# Patient Record
Sex: FEMALE | Race: WHITE | NOT HISPANIC OR LATINO | Employment: OTHER | URBAN - METROPOLITAN AREA
[De-identification: names, ages, dates, MRNs, and addresses within clinical notes are randomized per-mention and may not be internally consistent; named-entity substitution may affect disease eponyms.]

---

## 2017-04-10 ENCOUNTER — HOSPITAL ENCOUNTER (EMERGENCY)
Facility: HOSPITAL | Age: 67
Discharge: HOME | End: 2017-04-10
Attending: EMERGENCY MEDICINE
Payer: MEDICARE

## 2017-04-10 VITALS
RESPIRATION RATE: 18 BRPM | HEIGHT: 67 IN | SYSTOLIC BLOOD PRESSURE: 145 MMHG | OXYGEN SATURATION: 99 % | BODY MASS INDEX: 43.16 KG/M2 | HEART RATE: 77 BPM | DIASTOLIC BLOOD PRESSURE: 82 MMHG | TEMPERATURE: 100 F | WEIGHT: 275 LBS

## 2017-04-10 DIAGNOSIS — R11.2 NON-INTRACTABLE VOMITING WITH NAUSEA, UNSPECIFIED VOMITING TYPE: Primary | ICD-10-CM

## 2017-04-10 DIAGNOSIS — R19.7 DIARRHEA, UNSPECIFIED TYPE: ICD-10-CM

## 2017-04-10 LAB
ALBUMIN SERPL BCP-MCNC: 3.4 G/DL
ALP SERPL-CCNC: 84 U/L
ALT SERPL W/O P-5'-P-CCNC: 19 U/L
AMYLASE SERPL-CCNC: 33 U/L
ANION GAP SERPL CALC-SCNC: 12 MMOL/L
AST SERPL-CCNC: 15 U/L
BASOPHILS # BLD AUTO: 0.01 K/UL
BASOPHILS NFR BLD: 0.1 %
BILIRUB SERPL-MCNC: 0.4 MG/DL
BUN SERPL-MCNC: 32 MG/DL
CALCIUM SERPL-MCNC: 9.2 MG/DL
CHLORIDE SERPL-SCNC: 106 MMOL/L
CO2 SERPL-SCNC: 21 MMOL/L
CREAT SERPL-MCNC: 2.1 MG/DL
DIFFERENTIAL METHOD: ABNORMAL
EOSINOPHIL # BLD AUTO: 0.1 K/UL
EOSINOPHIL NFR BLD: 1 %
ERYTHROCYTE [DISTWIDTH] IN BLOOD BY AUTOMATED COUNT: 14.4 %
EST. GFR  (AFRICAN AMERICAN): 28 ML/MIN/1.73 M^2
EST. GFR  (NON AFRICAN AMERICAN): 24 ML/MIN/1.73 M^2
GLUCOSE SERPL-MCNC: 150 MG/DL
HCT VFR BLD AUTO: 40.4 %
HGB BLD-MCNC: 13.3 G/DL
LIPASE SERPL-CCNC: 16 U/L
LYMPHOCYTES # BLD AUTO: 0.4 K/UL
LYMPHOCYTES NFR BLD: 3.2 %
MCH RBC QN AUTO: 27.3 PG
MCHC RBC AUTO-ENTMCNC: 32.9 %
MCV RBC AUTO: 83 FL
MONOCYTES # BLD AUTO: 0.6 K/UL
MONOCYTES NFR BLD: 5.3 %
NEUTROPHILS # BLD AUTO: 10.2 K/UL
NEUTROPHILS NFR BLD: 90.4 %
PLATELET # BLD AUTO: 274 K/UL
PMV BLD AUTO: 9.5 FL
POTASSIUM SERPL-SCNC: 4.3 MMOL/L
PROT SERPL-MCNC: 7.7 G/DL
RBC # BLD AUTO: 4.87 M/UL
SODIUM SERPL-SCNC: 139 MMOL/L
WBC # BLD AUTO: 11.22 K/UL

## 2017-04-10 PROCEDURE — 85025 COMPLETE CBC W/AUTO DIFF WBC: CPT

## 2017-04-10 PROCEDURE — 99284 EMERGENCY DEPT VISIT MOD MDM: CPT | Mod: 25

## 2017-04-10 PROCEDURE — 96361 HYDRATE IV INFUSION ADD-ON: CPT

## 2017-04-10 PROCEDURE — 82150 ASSAY OF AMYLASE: CPT

## 2017-04-10 PROCEDURE — 63600175 PHARM REV CODE 636 W HCPCS: Performed by: EMERGENCY MEDICINE

## 2017-04-10 PROCEDURE — 96374 THER/PROPH/DIAG INJ IV PUSH: CPT

## 2017-04-10 PROCEDURE — 83690 ASSAY OF LIPASE: CPT

## 2017-04-10 PROCEDURE — 25000003 PHARM REV CODE 250: Performed by: EMERGENCY MEDICINE

## 2017-04-10 PROCEDURE — 80053 COMPREHEN METABOLIC PANEL: CPT

## 2017-04-10 RX ORDER — DIPHENOXYLATE HYDROCHLORIDE AND ATROPINE SULFATE 2.5; .025 MG/1; MG/1
1 TABLET ORAL 4 TIMES DAILY PRN
Qty: 20 TABLET | Refills: 0 | Status: SHIPPED | OUTPATIENT
Start: 2017-04-10 | End: 2017-04-20

## 2017-04-10 RX ORDER — LOSARTAN POTASSIUM 25 MG/1
25 TABLET ORAL DAILY
Status: ON HOLD | COMMUNITY
End: 2024-01-23

## 2017-04-10 RX ORDER — ONDANSETRON 2 MG/ML
4 INJECTION INTRAMUSCULAR; INTRAVENOUS
Status: COMPLETED | OUTPATIENT
Start: 2017-04-10 | End: 2017-04-10

## 2017-04-10 RX ORDER — PROMETHAZINE HYDROCHLORIDE 25 MG/1
25 TABLET ORAL EVERY 6 HOURS PRN
Qty: 15 TABLET | Refills: 0 | Status: ON HOLD | OUTPATIENT
Start: 2017-04-10 | End: 2024-01-23

## 2017-04-10 RX ADMIN — SODIUM CHLORIDE 1000 ML: 0.9 INJECTION, SOLUTION INTRAVENOUS at 08:04

## 2017-04-10 RX ADMIN — ONDANSETRON 4 MG: 2 INJECTION INTRAMUSCULAR; INTRAVENOUS at 08:04

## 2017-04-10 NOTE — ED PROVIDER NOTES
"SCRIBE #1 NOTE: I, Ld Gamez, am scribing for, and in the presence of, Mark Acevedo MD. I have scribed the entire note.      History      Chief Complaint   Patient presents with    Nausea     pt has had nausea and vomiting all last night       Review of patient's allergies indicates:   Allergen Reactions    Tetanus vaccines and toxoid Nausea And Vomiting    Codeine Other (See Comments)     Upset stomach        HPI   HPI    4/10/2017, 7:48 AM   History obtained from the patient      History of Present Illness: Sydney Lejeune is a 67 y.o. female patient who presents to the Emergency Department for N/V/D  which onset suddenly last PM. Symptoms are episodic and moderate in severity. Sx are exacerbated by nothing and relieved by nothing. Associated sxs include generalized abd pain which was relieved "after receiving a shot en route to ED via EMS." Patient denies any fever, chills, constipation, CP, SOB, dysuria, difficulty urinating, hematuria, urinary frequency/urgency, hematochezia and all other sxs at this time. No further complaints or concerns at this time.     Arrival mode: Personal vehicle     PCP: Primary Doctor No       Past Medical History:  Past Medical History:   Diagnosis Date    Diabetes mellitus     Diabetes mellitus with stage 4 chronic kidney disease     Hypertension     Kidney stones     MRSA (methicillin resistant staph aureus) culture positive     Renal disorder        Past Surgical History:  Past Surgical History:   Procedure Laterality Date    FOOT SURGERY      kidney stent placement           Family History:  Family History   Problem Relation Age of Onset    Hypertension Mother     Hypertension Father        Social History:  Social History     Social History Main Topics    Smoking status: Former Smoker    Smokeless tobacco: Not on file    Alcohol use No    Drug use: No    Sexual activity: Yes       ROS   Review of Systems   Constitutional: Negative for chills and fever. " "  HENT: Negative for congestion and sore throat.    Respiratory: Negative for chest tightness and shortness of breath.    Cardiovascular: Negative for chest pain.   Gastrointestinal: Positive for abdominal pain, diarrhea, nausea and vomiting.   Musculoskeletal: Negative for back pain and neck pain.   Skin: Negative for rash.   Neurological: Negative for dizziness, numbness and headaches.   Psychiatric/Behavioral: Negative for agitation and confusion.   All other systems reviewed and are negative.      Physical Exam    Initial Vitals   BP Pulse Resp Temp SpO2   04/10/17 0746 04/10/17 0746 04/10/17 0746 04/10/17 0746 04/10/17 0746   156/80 76 16 100.8 °F (38.2 °C) 97 %      Physical Exam  Nursing Notes and Vital Signs Reviewed.  Constitutional: Patient is in no acute distress. Awake and alert. Well-developed and well-nourished.   Head: Atraumatic. Normocephalic.  Eyes: PERRL. EOM intact. Conjunctivae are not pale. No scleral icterus.  ENT: Mucous membranes are dry. Oropharynx is clear and symmetric.    Neck: Supple. Full ROM. No lymphadenopathy.  Cardiovascular: Regular rate. Regular rhythm. No murmurs, rubs, or gallops. Distal pulses are 2+ and symmetric.  Pulmonary/Chest: No respiratory distress. Clear to auscultation bilaterally. No wheezing, rales, or rhonchi.  Abdominal: Soft and non-distended.  There is no tenderness.  No rebound, guarding, or rigidity. Good bowel sounds.  Musculoskeletal: Moves all extremities. No obvious deformities. No edema. No calf tenderness.  Skin: Warm and dry.  Neurological:  Alert, awake, and appropriate.  Normal speech.  No acute focal neurological deficits are appreciated.  Psychiatric: Normal affect. Good eye contact. Appropriate in content.    ED Course    Procedures  ED Vital Signs:  Vitals:    04/10/17 0746   BP: (!) 156/80   Pulse: 76   Resp: 16   Temp: (!) 100.8 °F (38.2 °C)   TempSrc: Oral   SpO2: 97%   Weight: 124.7 kg (275 lb)   Height: 5' 7" (1.702 m)       Abnormal Lab " Results:  Labs Reviewed   CBC W/ AUTO DIFFERENTIAL - Abnormal; Notable for the following:        Result Value    Gran # 10.2 (*)     Lymph # 0.4 (*)     Gran% 90.4 (*)     Lymph% 3.2 (*)     All other components within normal limits   COMPREHENSIVE METABOLIC PANEL - Abnormal; Notable for the following:     CO2 21 (*)     Glucose 150 (*)     BUN, Bld 32 (*)     Creatinine 2.1 (*)     Albumin 3.4 (*)     eGFR if  28 (*)     eGFR if non  24 (*)     All other components within normal limits   LIPASE   AMYLASE   URINALYSIS        All Lab Results:  Results for orders placed or performed during the hospital encounter of 04/10/17   CBC auto differential   Result Value Ref Range    WBC 11.22 3.90 - 12.70 K/uL    RBC 4.87 4.00 - 5.40 M/uL    Hemoglobin 13.3 12.0 - 16.0 g/dL    Hematocrit 40.4 37.0 - 48.5 %    MCV 83 82 - 98 fL    MCH 27.3 27.0 - 31.0 pg    MCHC 32.9 32.0 - 36.0 %    RDW 14.4 11.5 - 14.5 %    Platelets 274 150 - 350 K/uL    MPV 9.5 9.2 - 12.9 fL    Gran # 10.2 (H) 1.8 - 7.7 K/uL    Lymph # 0.4 (L) 1.0 - 4.8 K/uL    Mono # 0.6 0.3 - 1.0 K/uL    Eos # 0.1 0.0 - 0.5 K/uL    Baso # 0.01 0.00 - 0.20 K/uL    Gran% 90.4 (H) 38.0 - 73.0 %    Lymph% 3.2 (L) 18.0 - 48.0 %    Mono% 5.3 4.0 - 15.0 %    Eosinophil% 1.0 0.0 - 8.0 %    Basophil% 0.1 0.0 - 1.9 %    Differential Method Automated    Comprehensive metabolic panel   Result Value Ref Range    Sodium 139 136 - 145 mmol/L    Potassium 4.3 3.5 - 5.1 mmol/L    Chloride 106 95 - 110 mmol/L    CO2 21 (L) 23 - 29 mmol/L    Glucose 150 (H) 70 - 110 mg/dL    BUN, Bld 32 (H) 8 - 23 mg/dL    Creatinine 2.1 (H) 0.5 - 1.4 mg/dL    Calcium 9.2 8.7 - 10.5 mg/dL    Total Protein 7.7 6.0 - 8.4 g/dL    Albumin 3.4 (L) 3.5 - 5.2 g/dL    Total Bilirubin 0.4 0.1 - 1.0 mg/dL    Alkaline Phosphatase 84 55 - 135 U/L    AST 15 10 - 40 U/L    ALT 19 10 - 44 U/L    Anion Gap 12 8 - 16 mmol/L    eGFR if African American 28 (A) >60 mL/min/1.73 m^2    eGFR if  non  24 (A) >60 mL/min/1.73 m^2   Lipase   Result Value Ref Range    Lipase 16 4 - 60 U/L   Amylase   Result Value Ref Range    Amylase 33 20 - 110 U/L         Imaging Results:  Imaging Results     None                  The Emergency Provider reviewed the vital signs and test results, which are outlined above.    ED Discussion     8:53 AM: Reassessed pt at this time.  Pt states her condition has improved at this time and she is feeling much better. Pt is laying comfortably in ED bed and in NAD. Pt is awake, alert, and oriented. Discussed with pt all pertinent ED information and results. Discussed pt dx and plan of tx. Gave pt all f/u and return to the ED instructions. All questions and concerns were addressed at this time. Pt expresses understanding of information and instructions, and is comfortable with plan to discharge. Pt is stable for discharge.    I discussed with patient and/or family/caretaker that evaluation in the ED does not suggest any emergent or life threatening medical conditions requiring immediate intervention beyond what was provided in the ED, and I believe patient is safe for discharge.  Regardless, an unremarkable evaluation in the ED does not preclude the development or presence of a serious of life threatening condition. As such, patient was instructed to return immediately for any worsening or change in current symptoms.        ED Medication(s):  Medications   sodium chloride 0.9% bolus 1,000 mL (1,000 mLs Intravenous New Bag 4/10/17 0808)   ondansetron injection 4 mg (4 mg Intravenous Given 4/10/17 0807)       New Prescriptions    DIPHENOXYLATE-ATROPINE 2.5-0.025 MG (LOMOTIL) 2.5-0.025 MG PER TABLET    Take 1 tablet by mouth 4 (four) times daily as needed for Diarrhea.    PROMETHAZINE (PHENERGAN) 25 MG TABLET    Take 1 tablet (25 mg total) by mouth every 6 (six) hours as needed for Nausea.       Follow-up Information     Follow up with PCP In 2 days.    Contact information:     [phone]            Medical Decision Making    Medical Decision Making:   Clinical Tests:   Lab Tests: Reviewed and Ordered           Scribe Attestation:   Scribe #1: I performed the above scribed service and the documentation accurately describes the services I performed. I attest to the accuracy of the note.    Attending:   Physician Attestation Statement for Scribe #1: I, Mark Acevedo MD, personally performed the services described in this documentation, as scribed by Ld Gamez, in my presence, and it is both accurate and complete.          Clinical Impression       ICD-10-CM ICD-9-CM   1. Non-intractable vomiting with nausea, unspecified vomiting type R11.2 787.01   2. Diarrhea, unspecified type R19.7 787.91       Disposition:   Disposition: Discharged  Condition: Stable         Mark Acevedo MD  04/10/17 0902

## 2017-04-10 NOTE — ED AVS SNAPSHOT
[facility]  [address]  [place] [address]               Sydney Lejeune   4/10/2017  7:44 AM   ED    Description:  Female : 1950   Department:  [facility]            Your Care was Coordinated By:     Provider Role From To    Mark Acevedo MD Attending Provider 04/10/17 0747 --      Reason for Visit     Nausea           Diagnoses this Visit        Comments    Non-intractable vomiting with nausea, unspecified vomiting type    -  Primary     Diarrhea, unspecified type           ED Disposition     ED Disposition Condition Comment    Discharge             To Do List           Follow-up Information     Follow up with PCP In 2 days.    Contact information:    [phone]       These Medications        Disp Refills Start End    promethazine (PHENERGAN) 25 MG tablet 15 tablet 0 4/10/2017     Take 1 tablet (25 mg total) by mouth every 6 (six) hours as needed for Nausea. - Oral    diphenoxylate-atropine 2.5-0.025 mg (LOMOTIL) 2.5-0.025 mg per tablet 20 tablet 0 4/10/2017 2017    Take 1 tablet by mouth 4 (four) times daily as needed for Diarrhea. - Oral      [facility]     [facility] On Call Nurse Care Line -  Assistance  Unless otherwise directed by your provider, please contact [facility], our nurse care line that is available for  assistance.     Registered nurses in the [facility] provide: appointment scheduling, clinical advisement, health education, and other advisory services.  Call: [phone] (toll free)               Medications           Message regarding Medications     Verify the changes and/or additions to your medication regime listed below are the same as discussed with your clinician today.  If any of these changes or additions are incorrect, please notify your healthcare provider.        START taking these NEW medications        Refills    promethazine (PHENERGAN) 25 MG tablet 0    Sig: Take 1  tablet (25 mg total) by mouth every 6 (six) hours as needed for Nausea.    Class: Print    Route: Oral    diphenoxylate-atropine 2.5-0.025 mg (LOMOTIL) 2.5-0.025 mg per tablet 0    Sig: Take 1 tablet by mouth 4 (four) times daily as needed for Diarrhea.    Class: Print    Route: Oral      These medications were administered today        Dose Freq    sodium chloride 0.9% bolus 1,000 mL 1,000 mL ED 1 Time    Sig: Inject 1,000 mLs into the vein ED 1 Time.    Class: Normal    Route: Intravenous    ondansetron injection 4 mg 4 mg ED 1 Time    Sig: Inject 4 mg into the vein ED 1 Time.    Class: Normal    Route: Intravenous           Verify that the below list of medications is an accurate representation of the medications you are currently taking.  If none reported, the list may be blank. If incorrect, please contact your healthcare provider. Carry this list with you in case of emergency.           Current Medications     amlodipine (NORVASC) 10 MG tablet Take 10 mg by mouth once daily.    furosemide (LASIX) 40 MG tablet Take 40 mg by mouth 2 (two) times daily.    insulin aspart protamine-insulin aspart (NOVOLOG 70/30) 100 unit/mL (70-30) InPn pen Inject 28 Units into the skin 2 (two) times daily before meals.     losartan (COZAAR) 25 MG tablet Take 25 mg by mouth once daily.    metoprolol tartrate (LOPRESSOR) 100 MG tablet Take 100 mg by mouth 2 (two) times daily.    pravastatin (PRAVACHOL) 40 MG tablet Take 40 mg by mouth once daily.    diphenoxylate-atropine 2.5-0.025 mg (LOMOTIL) 2.5-0.025 mg per tablet Take 1 tablet by mouth 4 (four) times daily as needed for Diarrhea.    ondansetron (ZOFRAN) 4 MG tablet Take 1 tablet (4 mg total) by mouth every 8 (eight) hours as needed.    oxycodone-acetaminophen (PERCOCET)  mg per tablet Take 1 tablet by mouth every 4 (four) hours as needed for Pain.    promethazine (PHENERGAN) 25 MG tablet Take 1 tablet (25 mg total) by mouth every 6 (six) hours as needed for Nausea.     "       Clinical Reference Information           Your Vitals Were     BP Pulse Temp Resp Height Weight    156/80 (BP Location: Right arm, Patient Position: Sitting) 76 100.8 °F (38.2 °C) (Oral) 16 5' 7" (1.702 m) 124.7 kg (275 lb)    SpO2 BMI             97% 43.07 kg/m2         Allergies as of 4/10/2017        Reactions    Tetanus Vaccines And Toxoid Nausea And Vomiting    Codeine Other (See Comments)    Upset stomach      Immunizations Administered on Date of Encounter - 4/10/2017     None      ED Micro, Lab, POCT     Start Ordered       Status Ordering Provider    04/10/17 0757 04/10/17 0756  CBC auto differential  STAT      Final result     04/10/17 0757 04/10/17 0756  Comprehensive metabolic panel  STAT      Final result     04/10/17 0757 04/10/17 0756  Urinalysis  STAT      Acknowledged     04/10/17 0757 04/10/17 0756  Lipase  STAT      Final result     04/10/17 0757 04/10/17 0756  Amylase  STAT      Final result       ED Imaging Orders     None      Discharge References/Attachments     VOMITING AND DIARRHEA, SELF-CARE FOR (ENGLISH)      [facility] Sign-Up     Activating your [facility] account is as easy as 1-2-3!     1) Visit [email or web address], select Sign Up Now, enter this activation code and your date of birth, then select Next.  [email or web address]  Expires: 5/25/2017  8:55 AM      2) Create a username and password to use when you visit [facility] in the future and select a security question in case you lose your password and select Next.    3) Enter your e-mail address and click Sign Up!    Additional Information  If you have questions, please e-mail [email or web address] or call [phone] to talk to our [facility] staff. Remember, [facility] is NOT to be used for urgent needs. For medical emergencies, dial 911.         Smoking Cessation     If you would like to quit smoking:   You may be eligible for free services if you are a [place] resident and started smoking cigarettes before September 1, 1988.  Call " the Smoking Cessation Trust (SCT) toll free at (731) 910-1762 or (442) 000-9322.   Call 1-800-QUIT-NOW if you do not meet the above criteria.   Contact us via email: [email or web address]   View our website for more information: [email or web address]         [facility]  complies with applicable Federal civil rights laws and does not discriminate on the basis of race, color, national origin, age, disability, or sex.        Language Assistance Services     ATTENTION: Language assistance services are available, free of charge. Please call [phone].      ATENCIÓN: Si habla español, tiene a whitlock disposición servicios gratuitos de asistencia lingüística. Llame al [phone].     CHÚ Ý: N?u b?n nói Ti?ng Vi?t, có các d?ch v? h? tr? ngôn ng? mi?n phí dành cho b?n. G?i s? [phone].

## 2024-01-15 ENCOUNTER — HOSPITAL ENCOUNTER (INPATIENT)
Facility: HOSPITAL | Age: 74
LOS: 8 days | Discharge: HOSPICE | DRG: 296 | End: 2024-01-23
Attending: EMERGENCY MEDICINE | Admitting: INTERNAL MEDICINE
Payer: MEDICARE

## 2024-01-15 DIAGNOSIS — I46.9 CARDIAC ARREST: ICD-10-CM

## 2024-01-15 DIAGNOSIS — N39.0 URINARY TRACT INFECTION WITHOUT HEMATURIA, SITE UNSPECIFIED: ICD-10-CM

## 2024-01-15 DIAGNOSIS — B95.62 MRSA BACTEREMIA: ICD-10-CM

## 2024-01-15 DIAGNOSIS — R65.20 SEVERE SEPSIS: ICD-10-CM

## 2024-01-15 DIAGNOSIS — R78.81 MRSA BACTEREMIA: ICD-10-CM

## 2024-01-15 DIAGNOSIS — I47.20 VENTRICULAR TACHYCARDIA: ICD-10-CM

## 2024-01-15 DIAGNOSIS — A41.9 SEVERE SEPSIS: ICD-10-CM

## 2024-01-15 DIAGNOSIS — J11.1 INFLUENZA: ICD-10-CM

## 2024-01-15 DIAGNOSIS — I46.9 CARDIOPULMONARY ARREST: Primary | ICD-10-CM

## 2024-01-15 DIAGNOSIS — I47.29 NSVT (NONSUSTAINED VENTRICULAR TACHYCARDIA): ICD-10-CM

## 2024-01-15 PROBLEM — I10 HTN (HYPERTENSION): Status: ACTIVE | Noted: 2024-01-15

## 2024-01-15 PROBLEM — Z79.4 TYPE 2 DIABETES MELLITUS WITH KIDNEY COMPLICATION, WITH LONG-TERM CURRENT USE OF INSULIN: Status: ACTIVE | Noted: 2024-01-15

## 2024-01-15 PROBLEM — E11.29 TYPE 2 DIABETES MELLITUS WITH KIDNEY COMPLICATION, WITH LONG-TERM CURRENT USE OF INSULIN: Status: ACTIVE | Noted: 2024-01-15

## 2024-01-15 PROBLEM — J10.1 INFLUENZA B: Status: ACTIVE | Noted: 2024-01-15

## 2024-01-15 PROBLEM — N18.6 ESRD (END STAGE RENAL DISEASE): Status: ACTIVE | Noted: 2024-01-15

## 2024-01-15 PROBLEM — M86.60 CHRONIC OSTEOMYELITIS: Status: ACTIVE | Noted: 2024-01-15

## 2024-01-15 LAB
ALBUMIN SERPL BCP-MCNC: 2.6 G/DL (ref 3.5–5.2)
ALLENS TEST: ABNORMAL
ALP SERPL-CCNC: 147 U/L (ref 55–135)
ALT SERPL W/O P-5'-P-CCNC: 62 U/L (ref 10–44)
ANION GAP SERPL CALC-SCNC: 17 MMOL/L (ref 8–16)
ANISOCYTOSIS BLD QL SMEAR: SLIGHT
AORTIC ROOT ANNULUS: 2.91 CM
APTT PPP: 33.9 SEC (ref 21–32)
ASCENDING AORTA: 2.68 CM
AST SERPL-CCNC: 75 U/L (ref 10–40)
AV INDEX (PROSTH): 0.52
AV MEAN GRADIENT: 11 MMHG
AV PEAK GRADIENT: 20 MMHG
AV REGURGITATION PRESSURE HALF TIME: 497.59 MS
AV VALVE AREA BY VELOCITY RATIO: 1.78 CM²
AV VALVE AREA: 1.63 CM²
AV VELOCITY RATIO: 0.57
BACTERIA #/AREA URNS HPF: ABNORMAL /HPF
BASOPHILS # BLD AUTO: ABNORMAL K/UL (ref 0–0.2)
BASOPHILS NFR BLD: 0 % (ref 0–1.9)
BILIRUB SERPL-MCNC: 0.6 MG/DL (ref 0.1–1)
BILIRUB UR QL STRIP: NEGATIVE
BNP SERPL-MCNC: 1466 PG/ML (ref 0–99)
BSA FOR ECHO PROCEDURE: 2.32 M2
BUN SERPL-MCNC: 7 MG/DL (ref 8–23)
CALCIUM SERPL-MCNC: 8.5 MG/DL (ref 8.7–10.5)
CHLORIDE SERPL-SCNC: 105 MMOL/L (ref 95–110)
CLARITY UR: ABNORMAL
CO2 SERPL-SCNC: 14 MMOL/L (ref 23–29)
COLOR UR: YELLOW
CREAT SERPL-MCNC: 2.4 MG/DL (ref 0.5–1.4)
CV ECHO LV RWT: 0.85 CM
DACRYOCYTES BLD QL SMEAR: ABNORMAL
DELSYS: ABNORMAL
DIFFERENTIAL METHOD BLD: ABNORMAL
DOP CALC AO PEAK VEL: 2.22 M/S
DOP CALC AO VTI: 54 CM
DOP CALC LVOT AREA: 3.1 CM2
DOP CALC LVOT DIAMETER: 2 CM
DOP CALC LVOT PEAK VEL: 1.26 M/S
DOP CALC LVOT STROKE VOLUME: 88.23 CM3
DOP CALC RVOT PEAK VEL: 0.82 M/S
DOP CALC RVOT VTI: 16.9 CM
DOP CALCLVOT PEAK VEL VTI: 28.1 CM
E WAVE DECELERATION TIME: 249.44 MSEC
E/A RATIO: 0.78
ECHO LV POSTERIOR WALL: 1.83 CM (ref 0.6–1.1)
EOSINOPHIL # BLD AUTO: ABNORMAL K/UL (ref 0–0.5)
EOSINOPHIL NFR BLD: 2 % (ref 0–8)
ERYTHROCYTE [DISTWIDTH] IN BLOOD BY AUTOMATED COUNT: 14.3 % (ref 11.5–14.5)
ERYTHROCYTE [SEDIMENTATION RATE] IN BLOOD BY WESTERGREN METHOD: 18 MM/H
EST. GFR  (NO RACE VARIABLE): 21 ML/MIN/1.73 M^2
FIO2: 100
FRACTIONAL SHORTENING: 18 % (ref 28–44)
GLUCOSE SERPL-MCNC: 239 MG/DL (ref 70–110)
GLUCOSE UR QL STRIP: NEGATIVE
HCO3 UR-SCNC: 14.4 MMOL/L (ref 24–28)
HCT VFR BLD AUTO: 32 % (ref 37–48.5)
HGB BLD-MCNC: 9.8 G/DL (ref 12–16)
HGB UR QL STRIP: ABNORMAL
HYALINE CASTS #/AREA URNS LPF: 0 /LPF
IMM GRANULOCYTES # BLD AUTO: ABNORMAL K/UL (ref 0–0.04)
IMM GRANULOCYTES NFR BLD AUTO: ABNORMAL % (ref 0–0.5)
INFLUENZA A, MOLECULAR: NEGATIVE
INFLUENZA B, MOLECULAR: POSITIVE
INR PPP: 1.1 (ref 0.8–1.2)
INTERVENTRICULAR SEPTUM: 1.46 CM (ref 0.6–1.1)
IVC DIAMETER: 2.66 CM
IVRT: 102.76 MSEC
KETONES UR QL STRIP: NEGATIVE
LA MAJOR: 6.39 CM
LA MINOR: 6.8 CM
LA WIDTH: 3.9 CM
LACTATE SERPL-SCNC: 3.9 MMOL/L (ref 0.5–2.2)
LACTATE SERPL-SCNC: 8 MMOL/L (ref 0.5–2.2)
LEFT ATRIUM SIZE: 3.69 CM
LEFT ATRIUM VOLUME INDEX: 36.3 ML/M2
LEFT ATRIUM VOLUME: 80.59 CM3
LEFT INTERNAL DIMENSION IN SYSTOLE: 3.55 CM (ref 2.1–4)
LEFT VENTRICLE DIASTOLIC VOLUME INDEX: 38.09 ML/M2
LEFT VENTRICLE DIASTOLIC VOLUME: 84.55 ML
LEFT VENTRICLE MASS INDEX: 136 G/M2
LEFT VENTRICLE SYSTOLIC VOLUME INDEX: 23.8 ML/M2
LEFT VENTRICLE SYSTOLIC VOLUME: 52.79 ML
LEFT VENTRICULAR INTERNAL DIMENSION IN DIASTOLE: 4.33 CM (ref 3.5–6)
LEFT VENTRICULAR MASS: 301.17 G
LEUKOCYTE ESTERASE UR QL STRIP: ABNORMAL
LV LATERAL E/E' RATIO: 23.75 M/S
LVOT MG: 3.42 MMHG
LVOT MV: 0.88 CM/S
LYMPHOCYTES # BLD AUTO: ABNORMAL K/UL (ref 1–4.8)
LYMPHOCYTES NFR BLD: 16 % (ref 18–48)
MAGNESIUM SERPL-MCNC: 2.2 MG/DL (ref 1.6–2.6)
MCH RBC QN AUTO: 30.2 PG (ref 27–31)
MCHC RBC AUTO-ENTMCNC: 30.6 G/DL (ref 32–36)
MCV RBC AUTO: 99 FL (ref 82–98)
MICROSCOPIC COMMENT: ABNORMAL
MODE: ABNORMAL
MONOCYTES # BLD AUTO: ABNORMAL K/UL (ref 0.3–1)
MONOCYTES NFR BLD: 8 % (ref 4–15)
MV PEAK A VEL: 1.22 M/S
MV PEAK E VEL: 0.95 M/S
MV STENOSIS PRESSURE HALF TIME: 72.34 MS
MV VALVE AREA P 1/2 METHOD: 3.04 CM2
NEUTROPHILS NFR BLD: 74 % (ref 38–73)
NITRITE UR QL STRIP: NEGATIVE
NRBC BLD-RTO: 0 /100 WBC
PCO2 BLDA: 27 MMHG (ref 35–45)
PEEP: 5
PH SMN: 7.34 [PH] (ref 7.35–7.45)
PH UR STRIP: 7 [PH] (ref 5–8)
PHOSPHATE SERPL-MCNC: 3.3 MG/DL (ref 2.7–4.5)
PISA AR MAX VEL: 4.43 M/S
PISA TR MAX VEL: 1.29 M/S
PLATELET # BLD AUTO: 412 K/UL (ref 150–450)
PLATELET BLD QL SMEAR: ABNORMAL
PMV BLD AUTO: 8.9 FL (ref 9.2–12.9)
PO2 BLDA: 505 MMHG (ref 80–100)
POC BE: -11 MMOL/L
POC SATURATED O2: 100 % (ref 95–100)
POCT GLUCOSE: 266 MG/DL (ref 70–110)
POTASSIUM SERPL-SCNC: 3.6 MMOL/L (ref 3.5–5.1)
PROCALCITONIN SERPL IA-MCNC: 0.24 NG/ML
PROT SERPL-MCNC: 7.7 G/DL (ref 6–8.4)
PROT UR QL STRIP: ABNORMAL
PROTHROMBIN TIME: 11.4 SEC (ref 9–12.5)
PV MEAN GRADIENT: 1 MMHG
PV MV: 0.67 M/S
PV PEAK GRADIENT: 5 MMHG
PV PEAK VELOCITY: 1.14 M/S
RA MAJOR: 6.3 CM
RA PRESSURE ESTIMATED: 15 MMHG
RA WIDTH: 2.9 CM
RBC # BLD AUTO: 3.25 M/UL (ref 4–5.4)
RBC #/AREA URNS HPF: 44 /HPF (ref 0–4)
RIGHT VENTRICULAR END-DIASTOLIC DIMENSION: 3.54 CM
RV TB RVSP: 16 MMHG
RV TISSUE DOPPLER FREE WALL SYSTOLIC VELOCITY 1 (APICAL 4 CHAMBER VIEW): 12.74 CM/S
SAMPLE: ABNORMAL
SARS-COV-2 RDRP RESP QL NAA+PROBE: NEGATIVE
SITE: ABNORMAL
SODIUM SERPL-SCNC: 136 MMOL/L (ref 136–145)
SP GR UR STRIP: 1.01 (ref 1–1.03)
SPECIMEN SOURCE: ABNORMAL
SQUAMOUS #/AREA URNS HPF: 2 /HPF
STJ: 2.51 CM
TDI LATERAL: 0.04 M/S
TR MAX PG: 7 MMHG
TRICUSPID ANNULAR PLANE SYSTOLIC EXCURSION: 1.92 CM
TROPONIN I SERPL DL<=0.01 NG/ML-MCNC: 0.04 NG/ML (ref 0–0.03)
TROPONIN I SERPL DL<=0.01 NG/ML-MCNC: 3.11 NG/ML (ref 0–0.03)
TV REST PULMONARY ARTERY PRESSURE: 22 MMHG
UNIDENT CRYS URNS QL MICRO: ABNORMAL
URN SPEC COLLECT METH UR: ABNORMAL
UROBILINOGEN UR STRIP-ACNC: NEGATIVE EU/DL
VT: 450
WBC # BLD AUTO: 23.89 K/UL (ref 3.9–12.7)
WBC #/AREA URNS HPF: >100 /HPF (ref 0–5)
WBC CLUMPS URNS QL MICRO: ABNORMAL
Z-SCORE OF LEFT VENTRICULAR DIMENSION IN END DIASTOLE: -5.83
Z-SCORE OF LEFT VENTRICULAR DIMENSION IN END SYSTOLE: -2.23

## 2024-01-15 PROCEDURE — 63600175 PHARM REV CODE 636 W HCPCS: Performed by: INTERNAL MEDICINE

## 2024-01-15 PROCEDURE — 93010 ELECTROCARDIOGRAM REPORT: CPT | Mod: ,,, | Performed by: INTERNAL MEDICINE

## 2024-01-15 PROCEDURE — 87088 URINE BACTERIA CULTURE: CPT | Performed by: EMERGENCY MEDICINE

## 2024-01-15 PROCEDURE — 93005 ELECTROCARDIOGRAM TRACING: CPT

## 2024-01-15 PROCEDURE — 20000000 HC ICU ROOM

## 2024-01-15 PROCEDURE — 85730 THROMBOPLASTIN TIME PARTIAL: CPT | Performed by: EMERGENCY MEDICINE

## 2024-01-15 PROCEDURE — 63600175 PHARM REV CODE 636 W HCPCS

## 2024-01-15 PROCEDURE — 85610 PROTHROMBIN TIME: CPT | Performed by: EMERGENCY MEDICINE

## 2024-01-15 PROCEDURE — 99900035 HC TECH TIME PER 15 MIN (STAT)

## 2024-01-15 PROCEDURE — 84145 PROCALCITONIN (PCT): CPT | Performed by: EMERGENCY MEDICINE

## 2024-01-15 PROCEDURE — 84484 ASSAY OF TROPONIN QUANT: CPT | Mod: 91 | Performed by: INTERNAL MEDICINE

## 2024-01-15 PROCEDURE — 87502 INFLUENZA DNA AMP PROBE: CPT | Performed by: EMERGENCY MEDICINE

## 2024-01-15 PROCEDURE — 27000207 HC ISOLATION

## 2024-01-15 PROCEDURE — 63600175 PHARM REV CODE 636 W HCPCS: Performed by: EMERGENCY MEDICINE

## 2024-01-15 PROCEDURE — 84484 ASSAY OF TROPONIN QUANT: CPT | Performed by: EMERGENCY MEDICINE

## 2024-01-15 PROCEDURE — 84100 ASSAY OF PHOSPHORUS: CPT | Performed by: EMERGENCY MEDICINE

## 2024-01-15 PROCEDURE — U0002 COVID-19 LAB TEST NON-CDC: HCPCS | Performed by: EMERGENCY MEDICINE

## 2024-01-15 PROCEDURE — 83880 ASSAY OF NATRIURETIC PEPTIDE: CPT | Performed by: EMERGENCY MEDICINE

## 2024-01-15 PROCEDURE — 87077 CULTURE AEROBIC IDENTIFY: CPT | Performed by: EMERGENCY MEDICINE

## 2024-01-15 PROCEDURE — 94002 VENT MGMT INPAT INIT DAY: CPT

## 2024-01-15 PROCEDURE — 99291 CRITICAL CARE FIRST HOUR: CPT

## 2024-01-15 PROCEDURE — 36415 COLL VENOUS BLD VENIPUNCTURE: CPT | Performed by: INTERNAL MEDICINE

## 2024-01-15 PROCEDURE — 82803 BLOOD GASES ANY COMBINATION: CPT

## 2024-01-15 PROCEDURE — 25000003 PHARM REV CODE 250: Performed by: EMERGENCY MEDICINE

## 2024-01-15 PROCEDURE — 96365 THER/PROPH/DIAG IV INF INIT: CPT

## 2024-01-15 PROCEDURE — 51702 INSERT TEMP BLADDER CATH: CPT

## 2024-01-15 PROCEDURE — 25000003 PHARM REV CODE 250: Performed by: INTERNAL MEDICINE

## 2024-01-15 PROCEDURE — 87154 CUL TYP ID BLD PTHGN 6+ TRGT: CPT | Performed by: EMERGENCY MEDICINE

## 2024-01-15 PROCEDURE — 87186 SC STD MICRODIL/AGAR DIL: CPT | Mod: 59 | Performed by: EMERGENCY MEDICINE

## 2024-01-15 PROCEDURE — 87040 BLOOD CULTURE FOR BACTERIA: CPT | Mod: 59 | Performed by: EMERGENCY MEDICINE

## 2024-01-15 PROCEDURE — 80053 COMPREHEN METABOLIC PANEL: CPT | Performed by: EMERGENCY MEDICINE

## 2024-01-15 PROCEDURE — 36600 WITHDRAWAL OF ARTERIAL BLOOD: CPT

## 2024-01-15 PROCEDURE — 27100171 HC OXYGEN HIGH FLOW UP TO 24 HOURS

## 2024-01-15 PROCEDURE — 96366 THER/PROPH/DIAG IV INF ADDON: CPT

## 2024-01-15 PROCEDURE — 87086 URINE CULTURE/COLONY COUNT: CPT | Performed by: EMERGENCY MEDICINE

## 2024-01-15 PROCEDURE — 85027 COMPLETE CBC AUTOMATED: CPT | Performed by: EMERGENCY MEDICINE

## 2024-01-15 PROCEDURE — 83735 ASSAY OF MAGNESIUM: CPT | Performed by: EMERGENCY MEDICINE

## 2024-01-15 PROCEDURE — 81000 URINALYSIS NONAUTO W/SCOPE: CPT | Performed by: EMERGENCY MEDICINE

## 2024-01-15 PROCEDURE — 96368 THER/DIAG CONCURRENT INF: CPT

## 2024-01-15 PROCEDURE — 99900026 HC AIRWAY MAINTENANCE (STAT)

## 2024-01-15 PROCEDURE — 93010 ELECTROCARDIOGRAM REPORT: CPT | Mod: 76,,, | Performed by: INTERNAL MEDICINE

## 2024-01-15 PROCEDURE — 94761 N-INVAS EAR/PLS OXIMETRY MLT: CPT | Mod: XB

## 2024-01-15 PROCEDURE — 85007 BL SMEAR W/DIFF WBC COUNT: CPT | Performed by: EMERGENCY MEDICINE

## 2024-01-15 PROCEDURE — 5A1955Z RESPIRATORY VENTILATION, GREATER THAN 96 CONSECUTIVE HOURS: ICD-10-PCS | Performed by: INTERNAL MEDICINE

## 2024-01-15 PROCEDURE — 83605 ASSAY OF LACTIC ACID: CPT | Mod: 91 | Performed by: EMERGENCY MEDICINE

## 2024-01-15 RX ORDER — POTASSIUM CHLORIDE 7.45 MG/ML
10 INJECTION INTRAVENOUS
Status: DISPENSED | OUTPATIENT
Start: 2024-01-15 | End: 2024-01-15

## 2024-01-15 RX ORDER — PROPOFOL 10 MG/ML
0-50 INJECTION, EMULSION INTRAVENOUS CONTINUOUS
Status: DISCONTINUED | OUTPATIENT
Start: 2024-01-15 | End: 2024-01-16

## 2024-01-15 RX ORDER — PRAVASTATIN SODIUM 20 MG/1
40 TABLET ORAL DAILY
Status: DISCONTINUED | OUTPATIENT
Start: 2024-01-16 | End: 2024-01-16

## 2024-01-15 RX ORDER — CHLORHEXIDINE GLUCONATE ORAL RINSE 1.2 MG/ML
15 SOLUTION DENTAL 2 TIMES DAILY
Status: DISCONTINUED | OUTPATIENT
Start: 2024-01-15 | End: 2024-01-23

## 2024-01-15 RX ORDER — HYDRALAZINE HYDROCHLORIDE 20 MG/ML
10 INJECTION INTRAMUSCULAR; INTRAVENOUS EVERY 6 HOURS PRN
Status: DISCONTINUED | OUTPATIENT
Start: 2024-01-15 | End: 2024-01-19

## 2024-01-15 RX ORDER — CALCIUM GLUCONATE 20 MG/ML
1 INJECTION, SOLUTION INTRAVENOUS
Status: DISCONTINUED | OUTPATIENT
Start: 2024-01-15 | End: 2024-01-18

## 2024-01-15 RX ORDER — CALCIUM GLUCONATE 20 MG/ML
3 INJECTION, SOLUTION INTRAVENOUS
Status: DISCONTINUED | OUTPATIENT
Start: 2024-01-15 | End: 2024-01-18

## 2024-01-15 RX ORDER — CALCIUM GLUCONATE 20 MG/ML
2 INJECTION, SOLUTION INTRAVENOUS
Status: DISCONTINUED | OUTPATIENT
Start: 2024-01-15 | End: 2024-01-18

## 2024-01-15 RX ORDER — MAGNESIUM SULFATE HEPTAHYDRATE 40 MG/ML
2 INJECTION, SOLUTION INTRAVENOUS
Status: DISCONTINUED | OUTPATIENT
Start: 2024-01-15 | End: 2024-01-18

## 2024-01-15 RX ORDER — POTASSIUM CHLORIDE 7.45 MG/ML
80 INJECTION INTRAVENOUS
Status: DISCONTINUED | OUTPATIENT
Start: 2024-01-15 | End: 2024-01-18

## 2024-01-15 RX ORDER — SODIUM CHLORIDE 0.9 % (FLUSH) 0.9 %
10 SYRINGE (ML) INJECTION
Status: DISCONTINUED | OUTPATIENT
Start: 2024-01-15 | End: 2024-01-23 | Stop reason: HOSPADM

## 2024-01-15 RX ORDER — FAMOTIDINE 10 MG/ML
20 INJECTION INTRAVENOUS EVERY 12 HOURS
Status: DISCONTINUED | OUTPATIENT
Start: 2024-01-15 | End: 2024-01-15 | Stop reason: DRUGHIGH

## 2024-01-15 RX ORDER — POTASSIUM CHLORIDE 7.45 MG/ML
40 INJECTION INTRAVENOUS
Status: DISCONTINUED | OUTPATIENT
Start: 2024-01-15 | End: 2024-01-18

## 2024-01-15 RX ORDER — FAMOTIDINE 10 MG/ML
20 INJECTION INTRAVENOUS DAILY
Status: DISCONTINUED | OUTPATIENT
Start: 2024-01-16 | End: 2024-01-16

## 2024-01-15 RX ORDER — POTASSIUM CHLORIDE 7.45 MG/ML
60 INJECTION INTRAVENOUS
Status: DISCONTINUED | OUTPATIENT
Start: 2024-01-15 | End: 2024-01-18

## 2024-01-15 RX ORDER — GLUCAGON 1 MG
1 KIT INJECTION
Status: DISCONTINUED | OUTPATIENT
Start: 2024-01-15 | End: 2024-01-19

## 2024-01-15 RX ORDER — INSULIN ASPART 100 [IU]/ML
0-10 INJECTION, SOLUTION INTRAVENOUS; SUBCUTANEOUS EVERY 6 HOURS PRN
Status: DISCONTINUED | OUTPATIENT
Start: 2024-01-15 | End: 2024-01-19

## 2024-01-15 RX ORDER — MAGNESIUM SULFATE HEPTAHYDRATE 40 MG/ML
4 INJECTION, SOLUTION INTRAVENOUS
Status: DISCONTINUED | OUTPATIENT
Start: 2024-01-15 | End: 2024-01-18

## 2024-01-15 RX ADMIN — SODIUM CHLORIDE 1000 ML: 9 INJECTION, SOLUTION INTRAVENOUS at 01:01

## 2024-01-15 RX ADMIN — AMIODARONE HYDROCHLORIDE 0.5 MG/MIN: 1.8 INJECTION, SOLUTION INTRAVENOUS at 07:01

## 2024-01-15 RX ADMIN — PROPOFOL 5 MCG/KG/MIN: 10 INJECTION, EMULSION INTRAVENOUS at 02:01

## 2024-01-15 RX ADMIN — POTASSIUM CHLORIDE 10 MEQ: 7.46 INJECTION, SOLUTION INTRAVENOUS at 06:01

## 2024-01-15 RX ADMIN — AMIODARONE HYDROCHLORIDE 1 MG/MIN: 1.8 INJECTION, SOLUTION INTRAVENOUS at 01:01

## 2024-01-15 RX ADMIN — POTASSIUM CHLORIDE 10 MEQ: 7.46 INJECTION, SOLUTION INTRAVENOUS at 07:01

## 2024-01-15 RX ADMIN — PROPOFOL 25 MCG/KG/MIN: 10 INJECTION, EMULSION INTRAVENOUS at 09:01

## 2024-01-15 RX ADMIN — CHLORHEXIDINE GLUCONATE 0.12% ORAL RINSE 15 ML: 1.2 LIQUID ORAL at 08:01

## 2024-01-15 RX ADMIN — VANCOMYCIN HYDROCHLORIDE 2000 MG: 10 INJECTION, POWDER, LYOPHILIZED, FOR SOLUTION INTRAVENOUS at 06:01

## 2024-01-15 RX ADMIN — SODIUM CHLORIDE 848 ML: 0.9 INJECTION, SOLUTION INTRAVENOUS at 02:01

## 2024-01-15 RX ADMIN — INSULIN ASPART 4 UNITS: 100 INJECTION, SOLUTION INTRAVENOUS; SUBCUTANEOUS at 06:01

## 2024-01-15 RX ADMIN — PIPERACILLIN SODIUM AND TAZOBACTAM SODIUM 4.5 G: 4; .5 INJECTION, POWDER, FOR SOLUTION INTRAVENOUS at 05:01

## 2024-01-15 NOTE — ED PROVIDER NOTES
SCRIBE #1 NOTE: I, Julio Cesar Floyd, am scribing for, and in the presence of, Kiko Mishra MD. I have scribed the entire note.       History     Chief Complaint   Patient presents with    Cardiac Arrest     BIB EMS. Unwitnessed cardiac arrest, found slumped over steering wheel at a gas station, found pulseless and apneic. 6 rounds of CPR given, ROSC obtained, 3 epis, 300mg amiodarone given.      Review of patient's allergies indicates:   Allergen Reactions    Tetanus vaccines and toxoid Nausea And Vomiting    Codeine Other (See Comments)     Upset stomach         History of Present Illness     HPI    1/15/2024, 1:03 PM  History obtained from the EMS  Limited HPI and ROS secondary to acuity of condition      History of Present Illness: Sydney Lejeune is a 73 y.o. female patient with a PMHx of DM, stage 4 CKD, HTN, kidney stones, and MRSA who presents to the Emergency Department for evaluation post-cardiac arrest. Pt was found slumped over the steering wheel at a gas station, pulseless and apneic for unknown time but paramedics report she was still warm to touch at that time.. Enroute, pt achieved ROSC after 6 rounds CPR, 3 Epinephrine, and 300 mg Amiodarone. Her glucose was ~300 mg/dL, and she has a vascath in her R upper chest wall. She was intubated by paramedics in the field.  EMS reports that she was warm to touch on the scene.     Arrival mode: Ambulance Service     PCP: Maritza, Primary Doctor        Past Medical History:  Past Medical History:   Diagnosis Date    Diabetes mellitus     Diabetes mellitus with stage 4 chronic kidney disease     Hypertension     Kidney stones     MRSA (methicillin resistant staph aureus) culture positive     Renal disorder        Past Surgical History:  Past Surgical History:   Procedure Laterality Date    FOOT SURGERY      kidney stent placement           Family History:  Family History   Problem Relation Age of Onset    Hypertension Mother     Hypertension Father         Social History:  Social History     Tobacco Use    Smoking status: Former    Smokeless tobacco: Not on file   Substance and Sexual Activity    Alcohol use: No    Drug use: No    Sexual activity: Yes        Review of Systems     Review of Systems   Unable to perform ROS: Acuity of condition   Respiratory:  Positive for apnea and shortness of breath.    Neurological:  Positive for syncope.      Physical Exam     Initial Vitals   BP Pulse Resp Temp SpO2   01/15/24 1300 01/15/24 1259 01/15/24 1259 01/15/24 1511 01/15/24 1259   (!) 137/104 70 20 98.3 °F (36.8 °C) (!) 85 %      MAP       --                 Physical Exam  Nursing Notes and Vital Signs Reviewed.  Constitutional: Patient is in severe distress.   Head: Atraumatic. Normocephalic.  Eyes: Pupils fixed.  ENT: Intubated.   Neck: + JVD.  Cardiovascular: Regular rate. Regular rhythm. No murmurs, rubs, or gallops. Distal pulses are unable to be sensed.  Pulmonary/Chest: Severe respiratory distress. Vascath in R upper chest wall. Some retractions noted. Clear to auscultation bilaterally. No wheezing or rales.  Abdominal: Distended. Seemingly no tenderness. Decreased bowel sounds.   Genitourinary: Wearing diaper.  Musculoskeletal: Wearing walking boot in RLE. No obvious deformities. No edema.  Skin: BLE cool to touch. No cyanosis.  Neurological:  Unresponsive.   Psychiatric: Unresponsive.     ED Course   Critical Care    Date/Time: 1/15/2024 1:50 PM    Performed by: Kiko Mishra MD  Authorized by: Kiko Mishra MD  Direct patient critical care time: 15 minutes  Additional history critical care time: 10 minutes  Ordering / reviewing critical care time: 5 minutes  Documentation critical care time: 8 minutes  Consulting other physicians critical care time: 5 minutes  Consult with family critical care time: 8 minutes  Total critical care time (exclusive of procedural time) : 51 minutes  Critical care time was exclusive of separately billable procedures  "and treating other patients and teaching time.  Critical care was necessary to treat or prevent imminent or life-threatening deterioration of the following conditions: cardiac failure and respiratory failure.  Critical care was time spent personally by me on the following activities: blood draw for specimens, development of treatment plan with patient or surrogate, discussions with consultants, interpretation of cardiac output measurements, evaluation of patient's response to treatment, obtaining history from patient or surrogate, examination of patient, ordering and performing treatments and interventions, ordering and review of laboratory studies, ordering and review of radiographic studies, pulse oximetry, re-evaluation of patient's condition, review of old charts and ventilator management.        ED Vital Signs:  Vitals:    01/15/24 1315 01/15/24 1415 01/15/24 1420 01/15/24 1430   BP: 131/69 (!) 157/68  (!) 176/77   Pulse: 74 67  67   Resp: (!) 27 (!) 24  (!) 35   Temp:       TempSrc:       SpO2: 100% 100%  100%   Weight:       Height:   5' 7" (1.702 m)     01/15/24 1445 01/15/24 1506 01/15/24 1511 01/15/24 1515   BP: (!) 215/91 (!) 186/78 (!) 186/78 (!) 192/82   Pulse: (!) 57 63  62   Resp: (!) 45 (!) 41  (!) 24   Temp:   98.3 °F (36.8 °C)    TempSrc:       SpO2: (!) 76% 95%  95%   Weight:   113.4 kg (250 lb)    Height:   5' 7" (1.702 m)     01/15/24 1530 01/15/24 1545 01/15/24 1600 01/15/24 1615   BP: (!) 198/84 (!) 194/85 (!) 193/80 (!) 183/80   Pulse: 63 64 65 64   Resp: (!) 24 (!) 24 (!) 24 (!) 24   Temp:       TempSrc:       SpO2: (!) 94% (!) 94% (!) 94% (!) 93%   Weight:       Height:        01/15/24 1628 01/15/24 1730 01/15/24 1734   BP: (!) 184/81     Pulse: 65  72   Resp: (!) 23  (!) 30   Temp:  99.4 °F (37.4 °C)    TempSrc:  Oral    SpO2: 95%  98%   Weight:      Height:          Abnormal Lab Results:  Labs Reviewed   INFLUENZA A & B BY MOLECULAR - Abnormal; Notable for the following components:       " Result Value    Influenza B, Molecular Positive (*)     All other components within normal limits   CBC W/ AUTO DIFFERENTIAL - Abnormal; Notable for the following components:    WBC 23.89 (*)     RBC 3.25 (*)     Hemoglobin 9.8 (*)     Hematocrit 32.0 (*)     MCV 99 (*)     MCHC 30.6 (*)     MPV 8.9 (*)     Gran % 74.0 (*)     Lymph % 16.0 (*)     All other components within normal limits   COMPREHENSIVE METABOLIC PANEL - Abnormal; Notable for the following components:    CO2 14 (*)     Glucose 239 (*)     BUN 7 (*)     Creatinine 2.4 (*)     Calcium 8.5 (*)     Albumin 2.6 (*)     Alkaline Phosphatase 147 (*)     AST 75 (*)     ALT 62 (*)     eGFR 21 (*)     Anion Gap 17 (*)     All other components within normal limits   LACTIC ACID, PLASMA - Abnormal; Notable for the following components:    Lactate (Lactic Acid) 8.0 (*)     All other components within normal limits    Narrative:     LA critical result(s) called and verbal readback obtained from Rosamaria Cano RN  by ARIEL 01/15/2024 13:40   URINALYSIS, REFLEX TO URINE CULTURE - Abnormal; Notable for the following components:    Appearance, UA Cloudy (*)     Protein, UA 2+ (*)     Occult Blood UA 2+ (*)     Leukocytes, UA 3+ (*)     All other components within normal limits    Narrative:     Specimen Source->Urine   B-TYPE NATRIURETIC PEPTIDE - Abnormal; Notable for the following components:    BNP 1,466 (*)     All other components within normal limits   APTT - Abnormal; Notable for the following components:    aPTT 33.9 (*)     All other components within normal limits   TROPONIN I - Abnormal; Notable for the following components:    Troponin I 0.043 (*)     All other components within normal limits   URINALYSIS MICROSCOPIC - Abnormal; Notable for the following components:    RBC, UA 44 (*)     WBC, UA >100 (*)     WBC Clumps, UA Many (*)     All other components within normal limits    Narrative:     Specimen Source->Urine   ISTAT PROCEDURE - Abnormal; Notable  for the following components:    POC PH 7.336 (*)     POC PCO2 27.0 (*)     POC PO2 505 (*)     POC HCO3 14.4 (*)     POC BE -11 (*)     All other components within normal limits   CULTURE, URINE   MAGNESIUM   PHOSPHORUS   PROTIME-INR   PROCALCITONIN   SARS-COV-2 RNA AMPLIFICATION, QUAL   LACTIC ACID, PLASMA   TROPONIN I   POCT GLUCOSE MONITORING CONTINUOUS        All Lab Results:  Results for orders placed or performed during the hospital encounter of 01/15/24   Influenza A & B by Molecular    Specimen: Nasopharyngeal Swab   Result Value Ref Range    Influenza A, Molecular Negative Negative    Influenza B, Molecular Positive (A) Negative    Flu A & B Source Nasal swab    CBC auto differential   Result Value Ref Range    WBC 23.89 (H) 3.90 - 12.70 K/uL    RBC 3.25 (L) 4.00 - 5.40 M/uL    Hemoglobin 9.8 (L) 12.0 - 16.0 g/dL    Hematocrit 32.0 (L) 37.0 - 48.5 %    MCV 99 (H) 82 - 98 fL    MCH 30.2 27.0 - 31.0 pg    MCHC 30.6 (L) 32.0 - 36.0 g/dL    RDW 14.3 11.5 - 14.5 %    Platelets 412 150 - 450 K/uL    MPV 8.9 (L) 9.2 - 12.9 fL    Immature Granulocytes CANCELED 0.0 - 0.5 %    Immature Grans (Abs) CANCELED 0.00 - 0.04 K/uL    Lymph # CANCELED 1.0 - 4.8 K/uL    Mono # CANCELED 0.3 - 1.0 K/uL    Eos # CANCELED 0.0 - 0.5 K/uL    Baso # CANCELED 0.00 - 0.20 K/uL    nRBC 0 0 /100 WBC    Gran % 74.0 (H) 38.0 - 73.0 %    Lymph % 16.0 (L) 18.0 - 48.0 %    Mono % 8.0 4.0 - 15.0 %    Eosinophil % 2.0 0.0 - 8.0 %    Basophil % 0.0 0.0 - 1.9 %    Platelet Estimate Appears normal     Aniso Slight     Tear Drop Cells Occasional     Differential Method Manual    Comprehensive metabolic panel   Result Value Ref Range    Sodium 136 136 - 145 mmol/L    Potassium 3.6 3.5 - 5.1 mmol/L    Chloride 105 95 - 110 mmol/L    CO2 14 (L) 23 - 29 mmol/L    Glucose 239 (H) 70 - 110 mg/dL    BUN 7 (L) 8 - 23 mg/dL    Creatinine 2.4 (H) 0.5 - 1.4 mg/dL    Calcium 8.5 (L) 8.7 - 10.5 mg/dL    Total Protein 7.7 6.0 - 8.4 g/dL    Albumin 2.6 (L) 3.5 -  5.2 g/dL    Total Bilirubin 0.6 0.1 - 1.0 mg/dL    Alkaline Phosphatase 147 (H) 55 - 135 U/L    AST 75 (H) 10 - 40 U/L    ALT 62 (H) 10 - 44 U/L    eGFR 21 (A) >60 mL/min/1.73 m^2    Anion Gap 17 (H) 8 - 16 mmol/L   Lactic acid, plasma #1   Result Value Ref Range    Lactate (Lactic Acid) 8.0 (HH) 0.5 - 2.2 mmol/L   Urinalysis, Reflex to Urine Culture Urine, Clean Catch    Specimen: Urine   Result Value Ref Range    Specimen UA Urine, Clean Catch     Color, UA Yellow Yellow, Straw, Khalida    Appearance, UA Cloudy (A) Clear    pH, UA 7.0 5.0 - 8.0    Specific Gravity, UA 1.010 1.005 - 1.030    Protein, UA 2+ (A) Negative    Glucose, UA Negative Negative    Ketones, UA Negative Negative    Bilirubin (UA) Negative Negative    Occult Blood UA 2+ (A) Negative    Nitrite, UA Negative Negative    Urobilinogen, UA Negative <2.0 EU/dL    Leukocytes, UA 3+ (A) Negative   Magnesium   Result Value Ref Range    Magnesium 2.2 1.6 - 2.6 mg/dL   Phosphorus   Result Value Ref Range    Phosphorus 3.3 2.7 - 4.5 mg/dL   Brain natriuretic peptide   Result Value Ref Range    BNP 1,466 (H) 0 - 99 pg/mL   APTT   Result Value Ref Range    aPTT 33.9 (H) 21.0 - 32.0 sec   Protime-INR   Result Value Ref Range    Prothrombin Time 11.4 9.0 - 12.5 sec    INR 1.1 0.8 - 1.2   Troponin I   Result Value Ref Range    Troponin I 0.043 (H) 0.000 - 0.026 ng/mL   Procalcitonin   Result Value Ref Range    Procalcitonin 0.24 <0.25 ng/mL   COVID-19 Rapid Screening   Result Value Ref Range    SARS-CoV-2 RNA, Amplification, Qual Negative Negative   Urinalysis Microscopic   Result Value Ref Range    RBC, UA 44 (H) 0 - 4 /hpf    WBC, UA >100 (H) 0 - 5 /hpf    WBC Clumps, UA Many (A) None-Rare    Bacteria None None-Occ /hpf    Squam Epithel, UA 2 /hpf    Hyaline Casts, UA 0 0-1/lpf /lpf    Unclass Jeannette UA Occasional None-Moderate    Microscopic Comment SEE COMMENT    Echo   Result Value Ref Range    BSA 2.32 m2    LVOT stroke volume 88.23 cm3    LVIDd 4.33 3.5 -  6.0 cm    LV Systolic Volume 52.79 mL    LV Systolic Volume Index 23.8 mL/m2    LVIDs 3.55 2.1 - 4.0 cm    LV Diastolic Volume 84.55 mL    LV Diastolic Volume Index 38.09 mL/m2    IVS 1.46 (A) 0.6 - 1.1 cm    LVOT diameter 2.00 cm    LVOT area 3.1 cm2    FS 18 (A) 28 - 44 %    Left Ventricle Relative Wall Thickness 0.85 cm    Posterior Wall 1.83 (A) 0.6 - 1.1 cm    LV mass 301.17 g    LV Mass Index 136 g/m2    MV Peak E Cody 0.95 m/s    TDI LATERAL 0.04 m/s    MV Peak A Cody 1.22 m/s    TR Max Cody 1.29 m/s    E/A ratio 0.78     IVRT 102.76 msec    E wave deceleration time 249.44 msec    LV LATERAL E/E' RATIO 23.75 m/s    LVOT peak cody 1.26 m/s    Left Ventricular Outflow Tract Mean Velocity 0.88 cm/s    Left Ventricular Outflow Tract Mean Gradient 3.42 mmHg    RVDD 3.54 cm    RV S' 12.74 cm/s    RVOT peak VTI 16.9 cm    TAPSE 1.92 cm    LA size 3.69 cm    Left Atrium Minor Axis 6.80 cm    Left Atrium Major Axis 6.39 cm    RA Major Axis 6.30 cm    AV regurgitation pressure 1/2 time 497.808422534375576 ms    AR Max Cody 4.43 m/s    AV mean gradient 11 mmHg    AV peak gradient 20 mmHg    Ao peak cody 2.22 m/s    Ao VTI 54.00 cm    LVOT peak VTI 28.10 cm    AV valve area 1.63 cm²    AV Velocity Ratio 0.57     AV index (prosthetic) 0.52     DB by Velocity Ratio 1.78 cm²    MV stenosis pressure 1/2 time 72.34 ms    MV valve area p 1/2 method 3.04 cm2    Triscuspid Valve Regurgitation Peak Gradient 7 mmHg    PV mean gradient 1 mmHg    PV PEAK VELOCITY 1.14 m/s    PV peak gradient 5 mmHg    Pulmonary Valve Mean Velocity 0.67 m/s    RVOT peak cody 0.82 m/s    Ao root annulus 2.91 cm    STJ 2.51 cm    Ascending aorta 2.68 cm    IVC diameter 2.66 cm    ZLVIDS -2.23     ZLVIDD -5.83     LA Volume Index 36.3 mL/m2    LA volume 80.59 cm3    LA WIDTH 3.9 cm    RA Width 2.9 cm    TV resting pulmonary artery pressure 22 mmHg    RV TB RVSP 16 mmHg    Est. RA pres 15 mmHg   ISTAT PROCEDURE   Result Value Ref Range    POC PH 7.336 (L) 7.35  - 7.45    POC PCO2 27.0 (LL) 35 - 45 mmHg    POC PO2 505 (H) 80 - 100 mmHg    POC HCO3 14.4 (L) 24 - 28 mmol/L    POC BE -11 (L) -2 to 2 mmol/L    POC SATURATED O2 100 95 - 100 %    Rate 18     Sample ARTERIAL     Site RR     Allens Test Pass     DelSys Adult Vent     Mode AC/PRVC     Vt 450     PEEP 5     FiO2 100         Imaging Results:  Imaging Results              CT Chest Abdomen Pelvis Without Contrast (XPD) (Final result)  Result time 01/15/24 14:37:32      Final result by GUILLERMO Garces Sr., MD (01/15/24 14:37:32)                   Impression:      1. There are several pockets of gas in the right humeral head.  Osteomyelitis cannot be excluded.  2. Lymphadenopathy  3. Tiny bilateral pleural effusions  4. There are healing and/or healed fractures in the anterior aspect of the thoracic cage bilaterally.  All CT scans at this facility use dose modulation, iterative reconstruction, and/or weight base dosing when appropriate to reduce radiation dose when appropriate to reduce radiation dose to as low as reasonably achievable.      Electronically signed by: Abdiaziz Garces MD  Date:    01/15/2024  Time:    14:37               Narrative:    EXAMINATION:  CT CHEST ABDOMEN PELVIS WITHOUT CONTRAST(XPD)    CLINICAL HISTORY:  Sepsis;    TECHNIQUE:  Standard chest, abdomen, and pelvis CT protocol without oral or IV contrast was performed.    COMPARISON:  None    FINDINGS:  Finding: There is mild cardiomegaly.  There is a mild amount of atherosclerosis in the left anterior descending and left circumflex arteries.  There is an enlarged lymph node in the precarinal region.  It has a short axis measurement of 12 mm.  There are mild dependent atelectatic changes in both lungs.  There is a 4 mm noncalcified pulmonary nodule in the right upper lobe.  There are tiny bilateral pleural effusions.  There is no pneumothorax.  The left lobe of the thyroid is heterogeneous in appearance.  There are healing and/or healed fractures  in the anterior aspect of the thoracic cage bilaterally.  There are several pockets of gas in the right humeral head.    There are small stones in the dependent portion of the gallbladder.  There is moderate generalized atrophy of the pancreas.  The liver is enlarged.  It measures 23.2 cm in craniocaudal dimension.  The spleen, adrenals, and kidneys are normal in appearance. The ureters are normal in appearance.  A Reis catheter is in place.  The uterus and ovaries were not optimally visualized.  There is a 6 cm partially calcified mass arising from or adjacent to the left side of the uterus.  The appendix is not definitely visualized.  There are no inflammatory changes in the expected location of the appendix.  There is a moderate amount of diverticulosis in the sigmoid portion of the colon.  There is no free fluid within the abdomen or pelvis. There is no pneumoperitoneum.  There is a mildly enlarged lymph node in the left groin.  It has a short axis measurement of 11 mm.                                       CT Head Without Contrast (Final result)  Result time 01/15/24 14:06:42      Final result by GUILLERMO Garces Sr., MD (01/15/24 14:06:42)                   Impression:      There is no evidence of an acute ischemic event.    All CT scans at this facility use dose modulation, iterative reconstruction, and/or weight base dosing when appropriate to reduce radiation dose when appropriate to reduce radiation dose to as low as reasonably achievable.      Electronically signed by: Abdiaziz Garces MD  Date:    01/15/2024  Time:    14:06               Narrative:    EXAMINATION:  CT HEAD WITHOUT CONTRAST    CLINICAL HISTORY:  Neuro deficit, acute, stroke suspected;    TECHNIQUE:  Standard brain CT protocol without IV contrast was performed.    COMPARISON:  None    FINDINGS:  There is no evidence of an acute ischemic event.  The ventricles have a normal size, position, and appearance. There is no abnormal intracranial  mass or intracranial hemorrhage. There is no skull fracture.  There is moderate deviation to the right the nasal septum.  There are several polyps versus mucous retention cysts in the right maxillary sinus.  The largest measures 12 mm.                                       X-Ray Chest AP Portable (Final result)  Result time 01/15/24 13:47:24      Final result by GUILLERMO Garces Sr., MD (01/15/24 13:47:24)                   Impression:      1. There has been interval placement of an endotracheal tube. Its tip is located 38 mm superior to the jaqui. There has been interval placement of a right subclavian venous line. Its tip is in the superior vena cava. There has been interval placement of an NG tube. Its tip is in the stomach.  2. There is a minimal amount of haziness in the base of both lungs.  This is characteristic of atelectasis.  .      Electronically signed by: Abdiaziz Garces MD  Date:    01/15/2024  Time:    13:47               Narrative:    EXAMINATION:  XR CHEST AP PORTABLE    CLINICAL HISTORY:  Sepsis;    COMPARISON:  06/08/2014    FINDINGS:  There has been interval placement of an endotracheal tube.  Its tip is located 38 mm superior to the jaqui.  There has been interval placement of a right subclavian venous line.  Its tip is in the superior vena cava.  There has been interval placement of an NG tube.  Its tip is in the stomach.  The left border of the heart is not well seen secondary to an overlying lead.  There is a minimal amount of haziness in the base of both lungs.  There is no pneumothorax.  The right costophrenic angle is sharp.  The left costophrenic angle is not well seen secondary to overlying ribs.                                       The EKG was ordered, reviewed, and independently interpreted by the ED provider.  Interpretation time: 13:01  Rate: 126 BPM  Rhythm: Program found technically poor ECG. Suspect arm lead reversal, interpretation assumes no reversal.   Interpretation: Wide  QRS tachycardia with Premature supraventricular complexes and Premature ventricular complexes or Fusion complexes. Right bundle branch block. Lateral infarct, age undetermined. No STEMI.    The EKG was ordered, reviewed, and independently interpreted by the ED provider.  Interpretation time: 13:04  Rate: 133 BPM  Rhythm: Suspect arm lead reversal, interpretation assumes no reversal. Undetermined rhythm.  Interpretation: Right bundle branch block. Inferior infarct, age undetermined. T wave abnormality, consider lateral ischemia. No STEMI.    The EKG was ordered, reviewed, and independently interpreted by the ED provider.  Interpretation time: 15:32  Rate: 64 BPM  Rhythm: Sinus rhythm with 1st degree AV block.  Interpretation: Nonspecific intraventricular block. Anterolateral infarct, age undetermined. No STEMI.             The Emergency Provider reviewed the vital signs and test results, which are outlined above.     ED Discussion     2:49 PM: Notified Kathia Rios, pt's daughter.  Who will call her brother and they will come to the emergency room    2:58 PM: Per nurse, pt is having very short intervals of V-tach.  Amiodarone drip is still infusing    3:27 PM: Discussed case with David Barrios (Critical Care Medicine). Dr. Barrios agrees with current care and management of pt and accepts admission.   Admitting Service: Critical Care Medicine  Admitting Physician: Dr. Barrios  Admit to: ICU    3:27 PM: Re-evaluated pt. I have discussed test results, shared treatment plan, and the need for admission with patient and family at bedside. Pt and family express understanding at this time and agree with all information. All questions answered. Pt and family have no further questions or concerns at this time. Pt is ready for admit.         Medical Decision Making  Amount and/or Complexity of Data Reviewed  Independent Historian: EMS  Labs: ordered. Decision-making details documented in ED Course.  Radiology: ordered.  "Decision-making details documented in ED Course.  ECG/medicine tests: ordered and independent interpretation performed. Decision-making details documented in ED Course.  Discussion of management or test interpretation with external provider(s): Spoke with David Barrios MD (Critical Care Medicine): "Dialysis patient with osteomyelitis left foot on antibiotics per daughter.  Unwitnessed cardiac event at a gas station unknown time down. Positive for influenza B today. Although on dialysis, pt does make urine and has  a UTI. Intubated by paramedics. They did do 6 rounds of CPR amiodarone.  Patient did have intermittent ventricular fibrillation.  Amiodarone was started by paramedics and continued in the emergency room.  Cardiology was consulted to see the patient.  Dr. Smart will see the patient    Risk  Prescription drug management.  Decision regarding hospitalization.  Risk Details: Differential diagnosis: Dehydration, electrolyte abnormality, arrhythmia, infection, STEMI, NSTEMI, UTI, CVA, TIA, severe sepsis                  ED Medication(s):  Medications   piperacillin-tazobactam (ZOSYN) 4.5 g in dextrose 5 % in water (D5W) 100 mL IVPB (MB+) (4.5 g Intravenous New Bag 1/15/24 1752)   vancomycin - pharmacy to dose (has no administration in time range)   amiodarone 360 mg/200 mL (1.8 mg/mL) infusion (1 mg/min Intravenous New Bag 1/15/24 1321)   amiodarone 360 mg/200 mL (1.8 mg/mL) infusion (has no administration in time range)   vancomycin 2 g in dextrose 5 % 500 mL IVPB (has no administration in time range)   propofol (DIPRIVAN) 10 mg/mL infusion (5 mcg/kg/min × 113.4 kg Intravenous New Bag 1/15/24 1455)   potassium chloride 10 mEq in 100 mL IVPB (has no administration in time range)   sodium chloride 0.9% flush 10 mL (has no administration in time range)   potassium chloride 10 mEq in 100 mL IVPB (has no administration in time range)     And   potassium chloride 10 mEq in 100 mL IVPB (has no administration in time " range)     And   potassium chloride 10 mEq in 100 mL IVPB (has no administration in time range)   magnesium sulfate 2g in water 50mL IVPB (premix) (has no administration in time range)   magnesium sulfate 2g in water 50mL IVPB (premix) (has no administration in time range)   calcium gluconate 1 g in NS IVPB (premixed) (has no administration in time range)   calcium gluconate 1 g in NS IVPB (premixed) (has no administration in time range)   calcium gluconate 1 g in NS IVPB (premixed) (has no administration in time range)   chlorhexidine 0.12 % solution 15 mL (has no administration in time range)   pravastatin tablet 40 mg (has no administration in time range)   glucagon (human recombinant) injection 1 mg (has no administration in time range)   insulin aspart U-100 pen 0-10 Units (has no administration in time range)   dextrose 10% bolus 125 mL 125 mL (has no administration in time range)   dextrose 10% bolus 250 mL 250 mL (has no administration in time range)   famotidine (PF) injection 20 mg (has no administration in time range)   sodium chloride 0.9% bolus 1,000 mL 1,000 mL (0 mLs Intravenous Stopped 1/15/24 1515)   sodium chloride 0.9% bolus 1,848 mL 1,848 mL (848 mLs Intravenous New Bag 1/15/24 1430)       Current Discharge Medication List                  Scribe Attestation:   Scribe #1: I performed the above scribed service and the documentation accurately describes the services I performed. I attest to the accuracy of the note.     Attending:   Physician Attestation Statement for Scribe #1: I, Kiko Mishra MD, personally performed the services described in this documentation, as scribed by Julio Cesar Floyd, in my presence, and it is both accurate and complete.           Clinical Impression       ICD-10-CM ICD-9-CM   1. Cardiopulmonary arrest  I46.9 427.5   2. Cardiac arrest  I46.9 427.5   3. NSVT (nonsustained ventricular tachycardia)  I47.29 427.1   4. Influenza  J11.1 487.1   5. Urinary tract  infection without hematuria, site unspecified  N39.0 599.0   6. Ventricular tachycardia  I47.20 427.1   7. Severe sepsis  A41.9 038.9    R65.20 995.92       Disposition:   Disposition: Admitted  Condition: Critical         Kiko Mishra MD  01/15/24 1802       Kiko Mishra MD  01/15/24 1804

## 2024-01-15 NOTE — SUBJECTIVE & OBJECTIVE
Past Medical History:   Diagnosis Date    Diabetes mellitus     Diabetes mellitus with stage 4 chronic kidney disease     Hypertension     Kidney stones     MRSA (methicillin resistant staph aureus) culture positive     Renal disorder        Past Surgical History:   Procedure Laterality Date    FOOT SURGERY      kidney stent placement         Review of patient's allergies indicates:   Allergen Reactions    Tetanus vaccines and toxoid Nausea And Vomiting    Codeine Other (See Comments)     Upset stomach       Family History       Problem Relation (Age of Onset)    Hypertension Mother, Father          Tobacco Use    Smoking status: Former    Smokeless tobacco: Not on file   Substance and Sexual Activity    Alcohol use: No    Drug use: No    Sexual activity: Yes         Review of Systems   Unable to perform ROS: Intubated     Objective:     Vital Signs (Most Recent):  Temp: 99.4 °F (37.4 °C) (01/15/24 1730)  Pulse: 72 (01/15/24 1734)  Resp: (!) 30 (01/15/24 1734)  BP: (!) 184/81 (01/15/24 1628)  SpO2: 98 % (01/15/24 1734) Vital Signs (24h Range):  Temp:  [98.3 °F (36.8 °C)-99.4 °F (37.4 °C)] 99.4 °F (37.4 °C)  Pulse:  [49-74] 72  Resp:  [20-45] 30  SpO2:  [76 %-100 %] 98 %  BP: (131-215)/() 184/81     Weight: 113.4 kg (250 lb)  Body mass index is 39.16 kg/m².    No intake or output data in the 24 hours ending 01/15/24 1757     Physical Exam  Vitals and nursing note reviewed.   Constitutional:       General: She is not in acute distress.     Comments: Intubated and on low dose propofol during my exam   Cardiovascular:      Rate and Rhythm: Normal rate and regular rhythm.      Pulses: Normal pulses.      Heart sounds: No murmur heard.     Comments: Short runs of NSVT noted on the monitor  Pulmonary:      Effort: Pulmonary effort is normal. No respiratory distress.      Breath sounds: No wheezing, rhonchi or rales.   Abdominal:      General: Abdomen is flat. There is no distension.      Palpations: Abdomen is soft.       Tenderness: There is no abdominal tenderness.   Musculoskeletal:      Right lower leg: No edema.      Left lower leg: No edema.      Comments: Left foot wrapped in ACE bandage   Neurological:      Comments: Grimmace to pain, intact cough/gag/pupils/corneal          Vents:  Vent Mode: A/C (01/15/24 1734)  Set Rate: 24 BPM (01/15/24 1734)  Vt Set: 450 mL (01/15/24 1734)  PEEP/CPAP: 5 cmH20 (01/15/24 1734)  Oxygen Concentration (%): 50 (01/15/24 1734)  Peak Airway Pressure: 24 cmH20 (01/15/24 1734)  Plateau Pressure: 0 cmH20 (01/15/24 1734)  Total Ve: 13.6 L/m (01/15/24 1734)  Negative Inspiratory Force (cm H2O): 0 (01/15/24 1734)  F/VT Ratio<105 (RSBI): (!) 66.08 (01/15/24 1734)    Lines/Drains/Airways       Drain  Duration                  NG/OG Tube 01/15/24 1333 orogastric Center mouth <1 day         Urethral Catheter 01/15/24 1332 Latex;Double-lumen 16 Fr. <1 day              Airway  Duration                  Airway - Non-Surgical 01/15/24 Endotracheal Tube <1 day              Intraosseous Line  Duration                  Intraosseous Line 01/15/24 <1 day              Peripheral Intravenous Line  Duration                  Peripheral IV - Single Lumen 01/15/24 1302 18 G Left Antecubital <1 day         Peripheral IV - Single Lumen 01/15/24 1313 18 G Anterior;Proximal;Right Forearm <1 day         Peripheral IV - Single Lumen 01/15/24 1425 20 G Posterior;Right Hand <1 day                    Significant Labs:    CBC/Anemia Profile:  Recent Labs   Lab 01/15/24  1308   WBC 23.89*   HGB 9.8*   HCT 32.0*      MCV 99*   RDW 14.3        Chemistries:  Recent Labs   Lab 01/15/24  1308      K 3.6      CO2 14*   BUN 7*   CREATININE 2.4*   CALCIUM 8.5*   ALBUMIN 2.6*   PROT 7.7   BILITOT 0.6   ALKPHOS 147*   ALT 62*   AST 75*   MG 2.2   PHOS 3.3       All pertinent labs within the past 24 hours have been reviewed.    Significant Imaging:   I have reviewed all pertinent imaging results/findings within the  past 24 hours.

## 2024-01-15 NOTE — HPI
Ms Lejeune is a 72 y/o WF with ESRD, DM, HTN, and chronic left foot wound with osteo presents via EMS to the ER today after being found slumped over her steering wheel at a gas station.  History is taken from the medical record as the patient is unable to provide history and no family available at the time of my exam.  On EMS arrival, she was pulseless and apneic.  ROSC achieved following 6 rounds of CPR (Epi x 1 and 300mg of Amio).  She was intubated in the ER upon arrival.  Hemodynamics stable and low/mod vent requirements but ongoign short runs of NSVT.  Cardiology consulted in the ER.  Currently on amio gtt and propofol.  Initial CT Head unremarkable.  Labs notable for Lactic of 8, WBC 23.89, Hgb 9.8, K 3.6, BNP 1466, Trop 0.043, positive Flu B.  ABG shows metabolic acidosis with good respiratory compensation and pO2 505.  She is admitted to the ICU for post-arrest care.

## 2024-01-15 NOTE — PROGRESS NOTES
Pharmacist Renal Dose Adjustment Note    Sydney Lejeune is a 73 y.o. female being treated with the medication famotidine.     Patient Data:    Vital Signs (Most Recent):  Temp: 98.3 °F (36.8 °C) (01/15/24 1511)  Pulse: 72 (01/15/24 1734)  Resp: (!) 30 (01/15/24 1734)  BP: (!) 184/81 (01/15/24 1628)  SpO2: 98 % (01/15/24 1734) Vital Signs (72h Range):  Temp:  [98.3 °F (36.8 °C)]   Pulse:  [49-74]   Resp:  [20-45]   BP: (131-215)/()   SpO2:  [76 %-100 %]      Recent Labs   Lab 01/15/24  1308   CREATININE 2.4*     Serum creatinine: 2.4 mg/dL (H) 01/15/24 1308  Estimated creatinine clearance: 27.9 mL/min (A)    Medication: famotidine 20 mg IV BID will be changed to famotidine 20 mg IV daily per pharmacy renal dose adjustment protocol for patients with CrCl less than 50 mL/min.    Pharmacist's Name: Summer Song PharmD  Pharmacist's Extension: [phone]     Thank you for allowing us to participate in this patient's care.     Summer Song PharmD 01/15/2024 5:54 PM

## 2024-01-16 LAB
ACINETOBACTER CALCOACETICUS/BAUMANNII COMPLEX: NOT DETECTED
ALBUMIN SERPL BCP-MCNC: 2.3 G/DL (ref 3.5–5.2)
ALP SERPL-CCNC: 132 U/L (ref 55–135)
ALT SERPL W/O P-5'-P-CCNC: 46 U/L (ref 10–44)
ANION GAP SERPL CALC-SCNC: 11 MMOL/L (ref 8–16)
APTT PPP: 34.1 SEC (ref 21–32)
APTT PPP: 42.4 SEC (ref 21–32)
APTT PPP: 42.8 SEC (ref 21–32)
AST SERPL-CCNC: 45 U/L (ref 10–40)
BACTEROIDES FRAGILIS: NOT DETECTED
BASOPHILS # BLD AUTO: 0.05 K/UL (ref 0–0.2)
BASOPHILS NFR BLD: 0.3 % (ref 0–1.9)
BILIRUB SERPL-MCNC: 0.4 MG/DL (ref 0.1–1)
BUN SERPL-MCNC: 16 MG/DL (ref 8–23)
CALCIUM SERPL-MCNC: 8.2 MG/DL (ref 8.7–10.5)
CANDIDA ALBICANS: NOT DETECTED
CANDIDA AURIS: NOT DETECTED
CANDIDA GLABRATA: NOT DETECTED
CANDIDA KRUSEI: NOT DETECTED
CANDIDA PARAPSILOSIS: NOT DETECTED
CANDIDA TROPICALIS: NOT DETECTED
CHLORIDE SERPL-SCNC: 104 MMOL/L (ref 95–110)
CO2 SERPL-SCNC: 19 MMOL/L (ref 23–29)
CREAT SERPL-MCNC: 3.4 MG/DL (ref 0.5–1.4)
CRYPTOCOCCUS NEOFORMANS/GATTII: NOT DETECTED
CTX-M GENE (ESBL PRODUCER): ABNORMAL
DIFFERENTIAL METHOD BLD: ABNORMAL
ENTEROBACTER CLOACAE COMPLEX: NOT DETECTED
ENTEROBACTERALES: NOT DETECTED
ENTEROCOCCUS FAECALIS: NOT DETECTED
ENTEROCOCCUS FAECIUM: NOT DETECTED
EOSINOPHIL # BLD AUTO: 0 K/UL (ref 0–0.5)
EOSINOPHIL NFR BLD: 0.2 % (ref 0–8)
ERYTHROCYTE [DISTWIDTH] IN BLOOD BY AUTOMATED COUNT: 14.3 % (ref 11.5–14.5)
ESCHERICHIA COLI: NOT DETECTED
EST. GFR  (NO RACE VARIABLE): 14 ML/MIN/1.73 M^2
GLUCOSE SERPL-MCNC: 245 MG/DL (ref 70–110)
HAEMOPHILUS INFLUENZAE: NOT DETECTED
HCT VFR BLD AUTO: 27.6 % (ref 37–48.5)
HGB BLD-MCNC: 8.6 G/DL (ref 12–16)
IMM GRANULOCYTES # BLD AUTO: 0.12 K/UL (ref 0–0.04)
IMM GRANULOCYTES NFR BLD AUTO: 0.8 % (ref 0–0.5)
IMP GENE (CARBAPENEM RESISTANT): ABNORMAL
INR PPP: 1.2 (ref 0.8–1.2)
KLEBSIELLA AEROGENES: NOT DETECTED
KLEBSIELLA OXYTOCA: NOT DETECTED
KLEBSIELLA PNEUMONIAE GROUP: NOT DETECTED
KPC RESISTANCE GENE (CARBAPENEM): ABNORMAL
LISTERIA MONOCYTOGENES: NOT DETECTED
LYMPHOCYTES # BLD AUTO: 1.4 K/UL (ref 1–4.8)
LYMPHOCYTES NFR BLD: 8.7 % (ref 18–48)
MAGNESIUM SERPL-MCNC: 1.6 MG/DL (ref 1.6–2.6)
MCH RBC QN AUTO: 29.6 PG (ref 27–31)
MCHC RBC AUTO-ENTMCNC: 31.2 G/DL (ref 32–36)
MCR-1: ABNORMAL
MCV RBC AUTO: 95 FL (ref 82–98)
MEC A/C AND MREJ (MRSA): DETECTED
MEC A/C: ABNORMAL
MONOCYTES # BLD AUTO: 1 K/UL (ref 0.3–1)
MONOCYTES NFR BLD: 6.5 % (ref 4–15)
NDM GENE (CARBAPENEM RESISTANT): ABNORMAL
NEISSERIA MENINGITIDIS: NOT DETECTED
NEUTROPHILS # BLD AUTO: 13.1 K/UL (ref 1.8–7.7)
NEUTROPHILS NFR BLD: 83.5 % (ref 38–73)
NRBC BLD-RTO: 0 /100 WBC
OXA-48-LIKE (CARBAPENEM RESISTANT): ABNORMAL
PHOSPHATE SERPL-MCNC: 1.9 MG/DL (ref 2.7–4.5)
PLATELET # BLD AUTO: 305 K/UL (ref 150–450)
PMV BLD AUTO: 9 FL (ref 9.2–12.9)
POCT GLUCOSE: 172 MG/DL (ref 70–110)
POCT GLUCOSE: 194 MG/DL (ref 70–110)
POCT GLUCOSE: 224 MG/DL (ref 70–110)
POCT GLUCOSE: 242 MG/DL (ref 70–110)
POCT GLUCOSE: 272 MG/DL (ref 70–110)
POTASSIUM SERPL-SCNC: 4.4 MMOL/L (ref 3.5–5.1)
PROT SERPL-MCNC: 6.6 G/DL (ref 6–8.4)
PROTEUS SPECIES: NOT DETECTED
PROTHROMBIN TIME: 12.1 SEC (ref 9–12.5)
PSEUDOMONAS AERUGINOSA: NOT DETECTED
RBC # BLD AUTO: 2.91 M/UL (ref 4–5.4)
SALMONELLA SP: NOT DETECTED
SERRATIA MARCESCENS: NOT DETECTED
SODIUM SERPL-SCNC: 134 MMOL/L (ref 136–145)
STAPHYLOCOCCUS AUREUS: DETECTED
STAPHYLOCOCCUS EPIDERMIDIS: NOT DETECTED
STAPHYLOCOCCUS LUGDUNESIS: NOT DETECTED
STAPHYLOCOCCUS SPECIES: ABNORMAL
STENOTROPHOMONAS MALTOPHILIA: NOT DETECTED
STREPTOCOCCUS AGALACTIAE: NOT DETECTED
STREPTOCOCCUS PNEUMONIAE: NOT DETECTED
STREPTOCOCCUS PYOGENES: NOT DETECTED
STREPTOCOCCUS SPECIES: NOT DETECTED
TROPONIN I SERPL DL<=0.01 NG/ML-MCNC: 2.02 NG/ML (ref 0–0.03)
TROPONIN I SERPL DL<=0.01 NG/ML-MCNC: 2.52 NG/ML (ref 0–0.03)
TROPONIN I SERPL DL<=0.01 NG/ML-MCNC: 3.52 NG/ML (ref 0–0.03)
TROPONIN I SERPL DL<=0.01 NG/ML-MCNC: 4.97 NG/ML (ref 0–0.03)
VAN A/B (VRE GENE): ABNORMAL
VIM GENE (CARBAPENEM RESISTANT): ABNORMAL
WBC # BLD AUTO: 15.69 K/UL (ref 3.9–12.7)

## 2024-01-16 PROCEDURE — 93010 ELECTROCARDIOGRAM REPORT: CPT | Mod: ,,, | Performed by: INTERNAL MEDICINE

## 2024-01-16 PROCEDURE — 27100171 HC OXYGEN HIGH FLOW UP TO 24 HOURS

## 2024-01-16 PROCEDURE — 20000000 HC ICU ROOM

## 2024-01-16 PROCEDURE — 63600175 PHARM REV CODE 636 W HCPCS: Performed by: INTERNAL MEDICINE

## 2024-01-16 PROCEDURE — 25000003 PHARM REV CODE 250: Performed by: EMERGENCY MEDICINE

## 2024-01-16 PROCEDURE — 85610 PROTHROMBIN TIME: CPT

## 2024-01-16 PROCEDURE — 85730 THROMBOPLASTIN TIME PARTIAL: CPT

## 2024-01-16 PROCEDURE — 99900035 HC TECH TIME PER 15 MIN (STAT)

## 2024-01-16 PROCEDURE — 84484 ASSAY OF TROPONIN QUANT: CPT | Performed by: INTERNAL MEDICINE

## 2024-01-16 PROCEDURE — 99222 1ST HOSP IP/OBS MODERATE 55: CPT | Mod: ,,, | Performed by: INTERNAL MEDICINE

## 2024-01-16 PROCEDURE — 80053 COMPREHEN METABOLIC PANEL: CPT | Performed by: INTERNAL MEDICINE

## 2024-01-16 PROCEDURE — 63600175 PHARM REV CODE 636 W HCPCS: Performed by: EMERGENCY MEDICINE

## 2024-01-16 PROCEDURE — 27000207 HC ISOLATION

## 2024-01-16 PROCEDURE — 94761 N-INVAS EAR/PLS OXIMETRY MLT: CPT

## 2024-01-16 PROCEDURE — 84100 ASSAY OF PHOSPHORUS: CPT | Performed by: INTERNAL MEDICINE

## 2024-01-16 PROCEDURE — 85025 COMPLETE CBC W/AUTO DIFF WBC: CPT | Performed by: INTERNAL MEDICINE

## 2024-01-16 PROCEDURE — 63600175 PHARM REV CODE 636 W HCPCS

## 2024-01-16 PROCEDURE — 84484 ASSAY OF TROPONIN QUANT: CPT | Mod: 91 | Performed by: INTERNAL MEDICINE

## 2024-01-16 PROCEDURE — 25000003 PHARM REV CODE 250: Performed by: INTERNAL MEDICINE

## 2024-01-16 PROCEDURE — 83735 ASSAY OF MAGNESIUM: CPT | Performed by: INTERNAL MEDICINE

## 2024-01-16 PROCEDURE — 36415 COLL VENOUS BLD VENIPUNCTURE: CPT | Mod: XB | Performed by: INTERNAL MEDICINE

## 2024-01-16 PROCEDURE — 36415 COLL VENOUS BLD VENIPUNCTURE: CPT | Mod: XB

## 2024-01-16 PROCEDURE — 94003 VENT MGMT INPAT SUBQ DAY: CPT

## 2024-01-16 PROCEDURE — 85730 THROMBOPLASTIN TIME PARTIAL: CPT | Mod: 91 | Performed by: INTERNAL MEDICINE

## 2024-01-16 PROCEDURE — 99900026 HC AIRWAY MAINTENANCE (STAT)

## 2024-01-16 PROCEDURE — 93005 ELECTROCARDIOGRAM TRACING: CPT

## 2024-01-16 RX ORDER — HEPARIN SODIUM,PORCINE/D5W 25000/250
0-40 INTRAVENOUS SOLUTION INTRAVENOUS CONTINUOUS
Status: DISCONTINUED | OUTPATIENT
Start: 2024-01-16 | End: 2024-01-18

## 2024-01-16 RX ORDER — PRAVASTATIN SODIUM 20 MG/1
40 TABLET ORAL DAILY
Status: DISCONTINUED | OUTPATIENT
Start: 2024-01-17 | End: 2024-01-23

## 2024-01-16 RX ORDER — HEPARIN SODIUM 5000 [USP'U]/ML
5000 INJECTION, SOLUTION INTRAVENOUS; SUBCUTANEOUS EVERY 8 HOURS
Status: DISCONTINUED | OUTPATIENT
Start: 2024-01-16 | End: 2024-01-16

## 2024-01-16 RX ORDER — PROPOFOL 10 MG/ML
0-50 INJECTION, EMULSION INTRAVENOUS CONTINUOUS
Status: DISCONTINUED | OUTPATIENT
Start: 2024-01-16 | End: 2024-01-17

## 2024-01-16 RX ORDER — MUPIROCIN 20 MG/G
OINTMENT TOPICAL 2 TIMES DAILY
Status: COMPLETED | OUTPATIENT
Start: 2024-01-16 | End: 2024-01-20

## 2024-01-16 RX ORDER — FAMOTIDINE 40 MG/5ML
20 POWDER, FOR SUSPENSION ORAL DAILY
Status: DISCONTINUED | OUTPATIENT
Start: 2024-01-17 | End: 2024-01-23

## 2024-01-16 RX ADMIN — CHLORHEXIDINE GLUCONATE 0.12% ORAL RINSE 15 ML: 1.2 LIQUID ORAL at 08:01

## 2024-01-16 RX ADMIN — AMIODARONE HYDROCHLORIDE 0.5 MG/MIN: 1.8 INJECTION, SOLUTION INTRAVENOUS at 06:01

## 2024-01-16 RX ADMIN — CEFTRIAXONE SODIUM 2 G: 2 INJECTION, POWDER, FOR SOLUTION INTRAMUSCULAR; INTRAVENOUS at 10:01

## 2024-01-16 RX ADMIN — PIPERACILLIN SODIUM AND TAZOBACTAM SODIUM 4.5 G: 4; .5 INJECTION, POWDER, FOR SOLUTION INTRAVENOUS at 02:01

## 2024-01-16 RX ADMIN — PRAVASTATIN SODIUM 40 MG: 20 TABLET ORAL at 08:01

## 2024-01-16 RX ADMIN — SODIUM PHOSPHATE, MONOBASIC, MONOHYDRATE AND SODIUM PHOSPHATE, DIBASIC, ANHYDROUS 15 MMOL: 142; 276 INJECTION, SOLUTION INTRAVENOUS at 10:01

## 2024-01-16 RX ADMIN — INSULIN ASPART 1 UNITS: 100 INJECTION, SOLUTION INTRAVENOUS; SUBCUTANEOUS at 11:01

## 2024-01-16 RX ADMIN — INSULIN ASPART 2 UNITS: 100 INJECTION, SOLUTION INTRAVENOUS; SUBCUTANEOUS at 06:01

## 2024-01-16 RX ADMIN — INSULIN ASPART 4 UNITS: 100 INJECTION, SOLUTION INTRAVENOUS; SUBCUTANEOUS at 05:01

## 2024-01-16 RX ADMIN — MUPIROCIN: 20 OINTMENT TOPICAL at 10:01

## 2024-01-16 RX ADMIN — PROPOFOL 25 MCG/KG/MIN: 10 INJECTION, EMULSION INTRAVENOUS at 02:01

## 2024-01-16 RX ADMIN — AMIODARONE HYDROCHLORIDE 0.5 MG/MIN: 1.8 INJECTION, SOLUTION INTRAVENOUS at 05:01

## 2024-01-16 RX ADMIN — FAMOTIDINE 20 MG: 10 INJECTION, SOLUTION INTRAVENOUS at 08:01

## 2024-01-16 RX ADMIN — MAGNESIUM SULFATE HEPTAHYDRATE 2 G: 40 INJECTION, SOLUTION INTRAVENOUS at 09:01

## 2024-01-16 RX ADMIN — INSULIN ASPART 4 UNITS: 100 INJECTION, SOLUTION INTRAVENOUS; SUBCUTANEOUS at 01:01

## 2024-01-16 RX ADMIN — HEPARIN SODIUM 12 UNITS/KG/HR: 10000 INJECTION, SOLUTION INTRAVENOUS at 10:01

## 2024-01-16 RX ADMIN — CHLORHEXIDINE GLUCONATE 0.12% ORAL RINSE 15 ML: 1.2 LIQUID ORAL at 09:01

## 2024-01-16 RX ADMIN — INSULIN ASPART 3 UNITS: 100 INJECTION, SOLUTION INTRAVENOUS; SUBCUTANEOUS at 12:01

## 2024-01-16 RX ADMIN — MUPIROCIN: 20 OINTMENT TOPICAL at 09:01

## 2024-01-16 NOTE — H&P
[facility] - Intensive Care ([facility])  Critical Care Medicine  History & Physical    Patient Name: Sydney Lejeune  MRN: [id number]  Admission Date: 1/15/2024  Hospital Length of Stay: 0 days  Code Status: Full Code  Attending Physician: David Barrios MD   Primary Care Provider: No, Primary Doctor   Principal Problem: <principal problem not specified>    Subjective:     HPI:  Ms Lejeune is a 72 y/o WF with ESRD, DM, HTN, and chronic left foot wound with osteo presents via EMS to the ER today after being found slumped over her steering wheel at a gas station.  History is taken from the medical record as the patient is unable to provide history and no family available at the time of my exam.  On EMS arrival, she was pulseless and apneic.  ROSC achieved following 6 rounds of CPR (Epi x 1 and 300mg of Amio).  She was intubated in the ER upon arrival.  Hemodynamics stable and low/mod vent requirements but ongoign short runs of NSVT.  Cardiology consulted in the ER.  Currently on amio gtt and propofol.  Initial CT Head unremarkable.  Labs notable for Lactic of 8, WBC 23.89, Hgb 9.8, K 3.6, BNP 1466, Trop 0.043, positive Flu B.  ABG shows metabolic acidosis with good respiratory compensation and pO2 505.  She is admitted to the ICU for post-arrest care.    Hospital/ICU Course:  No notes on file     Past Medical History:   Diagnosis Date    Diabetes mellitus     Diabetes mellitus with stage 4 chronic kidney disease     Hypertension     Kidney stones     MRSA (methicillin resistant staph aureus) culture positive     Renal disorder        Past Surgical History:   Procedure Laterality Date    FOOT SURGERY      kidney stent placement         Review of patient's allergies indicates:   Allergen Reactions    Tetanus vaccines and toxoid Nausea And Vomiting    Codeine Other (See Comments)     Upset stomach       Family History       Problem Relation (Age of Onset)    Hypertension Mother, Father          Tobacco Use    Smoking  status: Former    Smokeless tobacco: Not on file   Substance and Sexual Activity    Alcohol use: No    Drug use: No    Sexual activity: Yes         Review of Systems   Unable to perform ROS: Intubated     Objective:     Vital Signs (Most Recent):  Temp: 99.4 °F (37.4 °C) (01/15/24 1730)  Pulse: 72 (01/15/24 1734)  Resp: (!) 30 (01/15/24 1734)  BP: (!) 184/81 (01/15/24 1628)  SpO2: 98 % (01/15/24 1734) Vital Signs (24h Range):  Temp:  [98.3 °F (36.8 °C)-99.4 °F (37.4 °C)] 99.4 °F (37.4 °C)  Pulse:  [49-74] 72  Resp:  [20-45] 30  SpO2:  [76 %-100 %] 98 %  BP: (131-215)/() 184/81     Weight: 113.4 kg (250 lb)  Body mass index is 39.16 kg/m².    No intake or output data in the 24 hours ending 01/15/24 1757     Physical Exam  Vitals and nursing note reviewed.   Constitutional:       General: She is not in acute distress.     Comments: Intubated and on low dose propofol during my exam   Cardiovascular:      Rate and Rhythm: Normal rate and regular rhythm.      Pulses: Normal pulses.      Heart sounds: No murmur heard.     Comments: Short runs of NSVT noted on the monitor  Pulmonary:      Effort: Pulmonary effort is normal. No respiratory distress.      Breath sounds: No wheezing, rhonchi or rales.   Abdominal:      General: Abdomen is flat. There is no distension.      Palpations: Abdomen is soft.      Tenderness: There is no abdominal tenderness.   Musculoskeletal:      Right lower leg: No edema.      Left lower leg: No edema.      Comments: Left foot wrapped in ACE bandage   Neurological:      Comments: Grimmace to pain, intact cough/gag/pupils/corneal          Vents:  Vent Mode: A/C (01/15/24 1734)  Set Rate: 24 BPM (01/15/24 1734)  Vt Set: 450 mL (01/15/24 1734)  PEEP/CPAP: 5 cmH20 (01/15/24 1734)  Oxygen Concentration (%): 50 (01/15/24 1734)  Peak Airway Pressure: 24 cmH20 (01/15/24 1734)  Plateau Pressure: 0 cmH20 (01/15/24 1734)  Total Ve: 13.6 L/m (01/15/24 1734)  Negative Inspiratory Force (cm H2O): 0  (01/15/24 1734)  F/VT Ratio<105 (RSBI): (!) 66.08 (01/15/24 1734)    Lines/Drains/Airways       Drain  Duration                  NG/OG Tube 01/15/24 1333 orogastric Center mouth <1 day         Urethral Catheter 01/15/24 1332 Latex;Double-lumen 16 Fr. <1 day              Airway  Duration                  Airway - Non-Surgical 01/15/24 Endotracheal Tube <1 day              Intraosseous Line  Duration                  Intraosseous Line 01/15/24 <1 day              Peripheral Intravenous Line  Duration                  Peripheral IV - Single Lumen 01/15/24 1302 18 G Left Antecubital <1 day         Peripheral IV - Single Lumen 01/15/24 1313 18 G Anterior;Proximal;Right Forearm <1 day         Peripheral IV - Single Lumen 01/15/24 1425 20 G Posterior;Right Hand <1 day                    Significant Labs:    CBC/Anemia Profile:  Recent Labs   Lab 01/15/24  1308   WBC 23.89*   HGB 9.8*   HCT 32.0*      MCV 99*   RDW 14.3        Chemistries:  Recent Labs   Lab 01/15/24  1308      K 3.6      CO2 14*   BUN 7*   CREATININE 2.4*   CALCIUM 8.5*   ALBUMIN 2.6*   PROT 7.7   BILITOT 0.6   ALKPHOS 147*   ALT 62*   AST 75*   MG 2.2   PHOS 3.3       All pertinent labs within the past 24 hours have been reviewed.    Significant Imaging:   I have reviewed all pertinent imaging results/findings within the past 24 hours.  Assessment/Plan:     Cardiac/Vascular  HTN (hypertension)  - holding home meds acutely  - will restart, as necessary    Cardiac arrest  - etiology unclear, though Cardiac seems most likely given runs of NSVT and good oxgenation  - Cardiology following  - continue amio gtt  - replete electrolytes cautiously given ESRD  - Echo pending  - Cardiac monitoring  - avoid fever  - initial CT unremarkable    Renal/  ESRD (end stage renal disease)  - MWF via left chest vascath  - no acute indication  - will consult Nephrology in the AM for routine dialysis  - renally dose meds and avoid  nephrotoxins    ID  Influenza B  - likely an incidental finding given her excellent oxygenation  - will hold off on Tamiflu given ESRD  - Droplet precautions  - monitor    Chronic osteomyelitis  - WOCN consulted  - best I can tell, not on any chronic Abx for this.  It seems she gets spot treated here and there and follows with outpt Wound Care regularly  - blood Cx pending    Endocrine  Type 2 diabetes mellitus with kidney complication, with long-term current use of insulin  - SSI/Accuchecks         Critical Care Time: 45 minutes  Critical secondary to respiratory failure, infusion of high risk medications    Critical care was time spent personally by me on the following activities: development of treatment plan with patient or surrogate and bedside caregivers, discussions with consultants, evaluation of patient's response to treatment, examination of patient, ordering and performing treatments and interventions, ordering and review of laboratory studies, ordering and review of radiographic studies, pulse oximetry, re-evaluation of patient's condition. This critical care time did not overlap with that of any other provider or involve time for any procedures.     David Barrios MD  Critical Care Medicine  [facility] - Intensive Care ([facility])   ASA III  EKG SB @ 55 bpm, LBB, no ST changes  Mallampati III

## 2024-01-16 NOTE — ASSESSMENT & PLAN NOTE
- likely an incidental finding given her excellent oxygenation  - will hold off on Tamiflu given ESRD  - Droplet precautions  - monitor

## 2024-01-16 NOTE — PLAN OF CARE
[facility] - Intensive Care (Hospital)  Initial Discharge Assessment       Primary Care Provider: No, Primary Doctor    Admission Diagnosis: Cardiac arrest [I46.9]  Cardiopulmonary arrest [I46.9]  Ventricular tachycardia [I47.20]  Influenza [J11.1]  NSVT (nonsustained ventricular tachycardia) [I47.29]  Severe sepsis [A41.9, R65.20]  Urinary tract infection without hematuria, site unspecified [N39.0]    Admission Date: 1/15/2024  Expected Discharge Date:     Transition of Care Barriers: None    Payor: MEDICARE / Plan: MEDICARE PART A & B / Product Type: Government /     Extended Emergency Contact Information  Primary Emergency Contact: Kathia Rios  Address: [address] [place] [address]  Mobile Phone: [phone]  Relation: Daughter  Secondary Emergency Contact: LeJeune,JeanLuc  Mobile Phone: [phone]  Relation: Son    Discharge Plan A: Other (pending hospital progress)       No Pharmacies Listed    Initial Assessment (most recent)       Adult Discharge Assessment - 01/16/24 1248          Discharge Assessment    Assessment Type Discharge Planning Assessment     Confirmed/corrected address, phone number and insurance Yes     Confirmed Demographics Correct on Facesheet     Source of Information family     When was your last doctors appointment? 01/11/24     Communicated HELEN with patient/caregiver Date not available/Unable to determine     Reason For Admission Cardiac arrest     People in Home child(christiano), adult     Facility Arrived From: home     Do you expect to return to your current living situation? Yes     Do you have help at home or someone to help you manage your care at home? Yes     Who are your caregiver(s) and their phone number(s)? Kathia dumont     Prior to hospitilization cognitive status: Alert/Oriented     Current cognitive status: Coma/Sedated/Intubated     Walking or Climbing Stairs Difficulty no     Dressing/Bathing Difficulty no     Home  Accessibility wheelchair accessible     Home Layout Able to live on 1st floor     Equipment Currently Used at Home glucometer     Readmission within 30 days? No     Patient currently being followed by outpatient case management? No     Do you currently have service(s) that help you manage your care at home? No     Do you take prescription medications? Yes     Do you have prescription coverage? Yes     Coverage Medicare     Do you have any problems affording any of your prescribed medications? No     Is the patient taking medications as prescribed? yes     Who is going to help you get home at discharge? adult children     How do you get to doctors appointments? car, drives self     Are you on dialysis? No     Do you take coumadin? No     Discharge Plan A Other   pending hospital progress    Discharge Plan discussed with: Adult children     Transition of Care Barriers None                   Anticipated DC dispo: TBD - pending hospital progress   Prior Level of Function: independent with ADLs, drives   People in home: daughterKathia     Comments:  CM spoke with patient's children, Kathia and AmeyaXu to introduce role and discuss discharge planning. Daughter will be help at home and can provide transport at time of discharge. Confirmed demographics, insurance, and emergency contacts. CM discharge needs depends on hospital progress. CM will continue following to assist with other needs.

## 2024-01-16 NOTE — ASSESSMENT & PLAN NOTE
- WOCN consulted  - best I can tell, not on any chronic Abx for this.  It seems she gets spot treated here and there and follows with outpt Wound Care regularly  - blood Cx pending

## 2024-01-16 NOTE — CONSULTS
[facility] - Intensive Care ([facility])  Wound Care    Patient Name:  Sydney Lejeune   MRN:  [id number]  Date: 1/16/2024  Diagnosis: <principal problem not specified>    History:     Past Medical History:   Diagnosis Date    Diabetes mellitus     Diabetes mellitus with stage 4 chronic kidney disease     Hypertension     Kidney stones     MRSA (methicillin resistant staph aureus) culture positive     Renal disorder        Social History     Socioeconomic History    Marital status:    Tobacco Use    Smoking status: Former   Substance and Sexual Activity    Alcohol use: No    Drug use: No    Sexual activity: Yes       Precautions:     Allergies as of 01/15/2024 - Unable to Assess 01/15/2024   Allergen Reaction Noted    Tetanus vaccines and toxoid Nausea And Vomiting 06/08/2014    Codeine Other (See Comments) 09/12/2015       WO Assessment Details/Treatment     Consulted on Ms. Lejeune for present on admission altered skin integrity. Patient seen in ICU, intubated and sedated.   Skin tears noted to bilateral wrists/forearms, moist red wound beds. Cleansed with saline and foam dressings applied. Recommend change weekly and prn  Left foot with charcot deformity. Chronic ulceration  with known osteo noted to plantar foot x2 with OP wound care, currently painted with gentian violet stain.  Cleansed with vashe and patted dry. Distal ulcer measures 0.5x0.5x2cm probes to bone, proximal ulcer measures 0.3x0.3x0.5cm. filled each with aquacel ag advantage rope, then foam dressing applied to cover. Heel offloading boots maintained. Recommend wound care twice weekly and prn excess drainage, continue OP as previously scheduled.     01/16/24 1135   WOCN Assessment   WOCN Total Time (mins) 30   Visit Date 01/16/24   Visit Time 1135   Consult Type New   WOCN Speciality Wound   Wound neuropathic;skin tear        Altered Skin Integrity 01/15/24 1900 Left plantar Foot Diabetic Ulcer   Date First Assessed/Time First Assessed: 01/15/24  1900   Altered Skin Integrity Present on Admission - Did Patient arrive to the hospital with altered skin?: yes  Side: Left  Orientation: plantar  Location: Foot  Primary Wound Type: Diabetic Ulcer   Wound Image    Description of Altered Skin Integrity   (not pressure related skin injury)   Dressing Appearance Intact   Drainage Amount Scant   Drainage Characteristics/Odor Serosanguineous   Appearance Red   Tissue loss description Full thickness   Periwound Area Intact;Dry   Wound Edges Open   Wound Length (cm) 0.5 cm   Wound Width (cm) 0.5 cm   Wound Depth (cm) 2 cm   Wound Volume (cm^3) 0.5 cm^3   Wound Surface Area (cm^2) 0.25 cm^2   Care Cleansed with:;Wound cleanser;Applied:;Skin Barrier   Dressing Hydrofiber;Silver;Foam   Packing packed with;hydrofiber/alginate   Packing Inserted  1   Dressing Change Due 01/19/24       Recommendations made to primary team for wound care, pressure injury prevention interventions . Orders placed.     01/16/2024

## 2024-01-16 NOTE — CONSULTS
[facility] ([facility])  Cardiology  Consult Note    Patient Name: Sydney Lejeune  MRN: [id number]  Admission Date: 1/15/2024  Hospital Length of Stay: 1 days  Code Status: Full Code   Attending Provider: David Barrios MD   Consulting Provider: Rachna Warren NP  Primary Care Physician: No, Primary Doctor  Principal Problem:<principal problem not specified>    Patient information was obtained from patient and ER records.     Inpatient consult to Cardiology  Consult performed by: Rachna Warren NP  Consult ordered by: Priyanka Braun NP        Subjective:     Chief Complaint:  cardiac arrest     HPI:   Ms Lejeune is a 72 y/o WF with ESRD, DM, HTN, and chronic left foot wound with osteo presents via EMS to the ER today after being found slumped over her steering wheel at a gas station.  History is taken from the medical record as the patient is unable to provide history and no family available at the time of my exam.  On EMS arrival, she was pulseless and apneic.  ROSC achieved following 6 rounds of CPR (Epi x 1 and 300mg of Amio).  She was intubated in the ER upon arrival.  Hemodynamics stable and low/mod vent requirements but ongoign short runs of NSVT. Currently on amio gtt and propofol.  Initial CT Head unremarkable.  Labs notable for Lactic of 8, WBC 23.89, Hgb 9.8, K 3.6, BNP 1466, Trop 0.043, positive Flu B.  ABG shows metabolic acidosis with good respiratory compensation and pO2 505.  She is admitted to the ICU for post-arrest care.    Cardiology consulted to assist with management. Chart reviewed, pt on isolation for Flu, pt intubated/sedated. No more VT noted per chart review. Discussed care with ICU MD. Labs reviewed, troponin trending down 4.0-3.5, H/H 8 and 27, Crt 3.4, Blood cultures positive for staph. Ech EF 45-50% mild AS. Pt was schedule for shunt sx 1/18 per chart review    Past Medical History:   Diagnosis Date    Diabetes mellitus     Diabetes mellitus with stage 4  chronic kidney disease     Hypertension     Kidney stones     MRSA (methicillin resistant staph aureus) culture positive     Renal disorder        Past Surgical History:   Procedure Laterality Date    FOOT SURGERY      kidney stent placement         Review of patient's allergies indicates:   Allergen Reactions    Tetanus vaccines and toxoid Nausea And Vomiting    Codeine Other (See Comments)     Upset stomach       No current facility-administered medications on file prior to encounter.     Current Outpatient Medications on File Prior to Encounter   Medication Sig    amlodipine (NORVASC) 10 MG tablet Take 10 mg by mouth once daily.    furosemide (LASIX) 40 MG tablet Take 40 mg by mouth 2 (two) times daily.    insulin aspart protamine-insulin aspart (NOVOLOG 70/30) 100 unit/mL (70-30) InPn pen Inject 28 Units into the skin 2 (two) times daily before meals.     losartan (COZAAR) 25 MG tablet Take 25 mg by mouth once daily.    metoprolol tartrate (LOPRESSOR) 100 MG tablet Take 100 mg by mouth 2 (two) times daily.    ondansetron (ZOFRAN) 4 MG tablet Take 1 tablet (4 mg total) by mouth every 8 (eight) hours as needed.    oxycodone-acetaminophen (PERCOCET)  mg per tablet Take 1 tablet by mouth every 4 (four) hours as needed for Pain.    pravastatin (PRAVACHOL) 40 MG tablet Take 40 mg by mouth once daily.    promethazine (PHENERGAN) 25 MG tablet Take 1 tablet (25 mg total) by mouth every 6 (six) hours as needed for Nausea.     Family History       Problem Relation (Age of Onset)    Hypertension Mother, Father          Tobacco Use    Smoking status: Former    Smokeless tobacco: Not on file   Substance and Sexual Activity    Alcohol use: No    Drug use: No    Sexual activity: Yes     Review of Systems   Reason unable to perform ROS: acuity.     Objective:     Vital Signs (Most Recent):  Temp: 97.2 °F (36.2 °C) (01/16/24 0923)  Pulse: 65 (01/16/24 0923)  Resp: (!) 24 (01/16/24 0923)  BP: 126/62 (01/16/24 0800)  SpO2:  "100 % (01/16/24 0923) Vital Signs (24h Range):  Temp:  [97.2 °F (36.2 °C)-99.4 °F (37.4 °C)] 97.2 °F (36.2 °C)  Pulse:  [49-74] 65  Resp:  [20-45] 24  SpO2:  [76 %-100 %] 100 %  BP: (121-215)/() 126/62     Weight: 113.4 kg (250 lb)  Body mass index is 39.16 kg/m².    SpO2: 100 %         Intake/Output Summary (Last 24 hours) at 1/16/2024 0935  Last data filed at 1/16/2024 0823  Gross per 24 hour   Intake 5496.98 ml   Output 21 ml   Net 5475.98 ml       Lines/Drains/Airways       Central Venous Catheter Line  Duration                  Hemodialysis Catheter right subclavian -- days              Drain  Duration                  NG/OG Tube 01/15/24 1333 orogastric Center mouth <1 day         Urethral Catheter 01/15/24 1332 Latex;Double-lumen 16 Fr. <1 day              Airway  Duration                  Airway - Non-Surgical 01/15/24 Endotracheal Tube 1 day              Peripheral Intravenous Line  Duration                  Peripheral IV - Single Lumen 01/15/24 1302 18 G Left Antecubital <1 day         Peripheral IV - Single Lumen 01/15/24 1313 18 G Right Antecubital <1 day         Peripheral IV - Single Lumen 01/15/24 1425 20 G Posterior;Right Hand <1 day                     Physical Exam     Significant Labs: BMP:   Recent Labs   Lab 01/15/24  1308 01/16/24  0552   * 245*    134*   K 3.6 4.4    104   CO2 14* 19*   BUN 7* 16   CREATININE 2.4* 3.4*   CALCIUM 8.5* 8.2*   MG 2.2 1.6   , CMP   Recent Labs   Lab 01/15/24  1308 01/16/24  0552    134*   K 3.6 4.4    104   CO2 14* 19*   * 245*   BUN 7* 16   CREATININE 2.4* 3.4*   CALCIUM 8.5* 8.2*   PROT 7.7 6.6   ALBUMIN 2.6* 2.3*   BILITOT 0.6 0.4   ALKPHOS 147* 132   AST 75* 45*   ALT 62* 46*   ANIONGAP 17* 11   , CBC   Recent Labs   Lab 01/15/24  1308 01/16/24  0552   WBC 23.89* 15.69*   HGB 9.8* 8.6*   HCT 32.0* 27.6*    305   , INR   Recent Labs   Lab 01/15/24  1308   INR 1.1   , Lipid Panel No results for input(s): "CHOL", " ""HDL", "LDLCALC", "TRIG", "CHOLHDL" in the last 48 hours., Troponin   Recent Labs   Lab 01/15/24  1833 01/16/24  0055 01/16/24  0552   TROPONINI 3.106* 4.966* 3.522*   , and All pertinent lab results from the last 24 hours have been reviewed.    Significant Imaging: Cardiac Cath: reviewed, Echocardiogram: Transthoracic echo (TTE) complete (Cupid Only):   Results for orders placed or performed during the hospital encounter of 01/15/24   Echo   Result Value Ref Range    BSA 2.32 m2    LVOT stroke volume 88.23 cm3    LVIDd 4.33 3.5 - 6.0 cm    LV Systolic Volume 52.79 mL    LV Systolic Volume Index 23.8 mL/m2    LVIDs 3.55 2.1 - 4.0 cm    LV Diastolic Volume 84.55 mL    LV Diastolic Volume Index 38.09 mL/m2    IVS 1.46 (A) 0.6 - 1.1 cm    LVOT diameter 2.00 cm    LVOT area 3.1 cm2    FS 18 (A) 28 - 44 %    Left Ventricle Relative Wall Thickness 0.85 cm    Posterior Wall 1.83 (A) 0.6 - 1.1 cm    LV mass 301.17 g    LV Mass Index 136 g/m2    MV Peak E Cody 0.95 m/s    TDI LATERAL 0.04 m/s    MV Peak A Cody 1.22 m/s    TR Max Cody 1.29 m/s    E/A ratio 0.78     IVRT 102.76 msec    E wave deceleration time 249.44 msec    LV LATERAL E/E' RATIO 23.75 m/s    LVOT peak cody 1.26 m/s    Left Ventricular Outflow Tract Mean Velocity 0.88 cm/s    Left Ventricular Outflow Tract Mean Gradient 3.42 mmHg    RVDD 3.54 cm    RV S' 12.74 cm/s    RVOT peak VTI 16.9 cm    TAPSE 1.92 cm    LA size 3.69 cm    Left Atrium Minor Axis 6.80 cm    Left Atrium Major Axis 6.39 cm    RA Major Axis 6.30 cm    AV regurgitation pressure 1/2 time [id number] ms    AR Max Cody 4.43 m/s    AV mean gradient 11 mmHg    AV peak gradient 20 mmHg    Ao peak cody 2.22 m/s    Ao VTI 54.00 cm    LVOT peak VTI 28.10 cm    AV valve area 1.63 cm²    AV Velocity Ratio 0.57     AV index (prosthetic) 0.52     DB by Velocity Ratio 1.78 cm²    MV stenosis pressure 1/2 time 72.34 ms    MV valve area p 1/2 method 3.04 cm2    Triscuspid Valve Regurgitation Peak Gradient " 7 mmHg    PV mean gradient 1 mmHg    PV PEAK VELOCITY 1.14 m/s    PV peak gradient 5 mmHg    Pulmonary Valve Mean Velocity 0.67 m/s    RVOT peak philippe 0.82 m/s    Ao root annulus 2.91 cm    STJ 2.51 cm    Ascending aorta 2.68 cm    IVC diameter 2.66 cm    ZLVIDS -2.23     ZLVIDD -5.83     LA Volume Index 36.3 mL/m2    LA volume 80.59 cm3    LA WIDTH 3.9 cm    RA Width 2.9 cm    TV resting pulmonary artery pressure 22 mmHg    RV TB RVSP 16 mmHg    Est. RA pres 15 mmHg    Narrative      Left Ventricle: The left ventricle is normal in size. Normal wall   thickness. There is concentric hypertrophy. Mild global hypokinesis   present. There is mildly reduced systolic function with a visually   estimated ejection fraction of 45 - 50%. There is diastolic dysfunction   but grade cannot be determined. Elevated left ventricular filling   pressure.    Right Ventricle: Normal right ventricular cavity size. Wall thickness   is normal. Right ventricle wall motion  is normal. Systolic function is   borderline low.    Left Atrium: Left atrium is mildly dilated.    Aortic Valve: Mildly calcified cusps. There is mild annular dilation   present. There is mild stenosis. Aortic valve area by VTI is 1.63 cm².   Aortic valve peak velocity is 2.22 m/s. Mean gradient is 11 mmHg. The   dimensionless index is 0.52. There is moderate aortic regurgitation.    Mitral Valve: There is moderate mitral annular calcification present.   There is mild regurgitation.    Tricuspid Valve: There is mild regurgitation.    Pulmonary Artery: The estimated pulmonary artery systolic pressure is   22 mmHg.    IVC/SVC: Elevated venous pressure at 15 mmHg.    Pericardium: There is a small circumferential effusion. No indication   of cardiac tamponade.    Supine/ventilated, STAT ER echo     , EKG: reviewed, Stress Test: reviewed, and X-Ray: CXR: X-Ray Chest 1 View (CXR): No results found for this visit on 01/15/24.  Assessment and Plan:     HTN (hypertension)  Titrate  meds    Cardiac arrest  Hep gtt ordered, trop 4.9 trending down 3.5 likely demand from sepsis blood culture positive for staph  Ischemia eval once awake and pending neuro status  Cont ICU supportive care  EKG with LBBB  No further VT reported  Cont Amio gtt, statin        VTE Risk Mitigation (From admission, onward)           Ordered     heparin 25,000 units in dextrose 5% (100 units/ml) IV bolus from bag - ADDITIONAL PRN BOLUS - 60 units/kg (max bolus 4000 units)  As needed (PRN)        Question:  Heparin Infusion Adjustment (DO NOT MODIFY ANSWER)  Answer:  [email or web address]\epic\Images\Pharmacy\HeparinInfusions\heparin LOW INTENSITY nomogram for [facility] [id number]    01/16/24 0937     heparin 25,000 units in dextrose 5% (100 units/ml) IV bolus from bag - ADDITIONAL PRN BOLUS - 30 units/kg (max bolus 4000 units)  As needed (PRN)        Question:  Heparin Infusion Adjustment (DO NOT MODIFY ANSWER)  Answer:  \\[email or web address]\epic\Images\Pharmacy\HeparinInfusions\heparin LOW INTENSITY nomogram for [facility] [id number].pdf    01/16/24 0937     heparin 25,000 units in dextrose 5% (100 units/ml) IV bolus from bag INITIAL BOLUS (max bolus 4000 units)  Once        Question:  Heparin Infusion Adjustment (DO NOT MODIFY ANSWER)  Answer:  \\[email or web address]\epic\Images\Pharmacy\HeparinInfusions\heparin LOW INTENSITY nomogram for [facility] [id number].pdf    01/16/24 0937     heparin 25,000 units in dextrose 5% 250 mL (100 units/mL) infusion LOW INTENSITY nomogram - [facility]  Continuous        Question:  Begin at (units/kg/hr)  Answer:  12    01/16/24 0937     IP VTE HIGH RISK PATIENT  Once         01/15/24 1748     Place sequential compression device  Until discontinued         01/15/24 1748                    Thank you for your consult. I will follow-up with patient. Please contact us if you have any additional questions.    Rachna Warren, NP  Cardiology   [facility] ([facility])

## 2024-01-16 NOTE — ASSESSMENT & PLAN NOTE
- etiology unclear, though Cardiac seems most likely given runs of NSVT and good oxgenation  - Cardiology following  - continue amio gtt  - replete electrolytes cautiously given ESRD  - Echo pending  - Cardiac monitoring  - avoid fever  - initial CT unremarkable

## 2024-01-16 NOTE — ASSESSMENT & PLAN NOTE
- etiology unclear, initially though Cardiac most likely given runs of NSVT and good oxgenation, but now blood cultures are showing Staph  - empiric Abx  - Cardiology following  - amio gtt per Cardiology  - replete electrolytes cautiously given ESRD  - Echo with mildly decreased LVEF  - Cardiac monitoring  - avoid fever  - initial CT unremarkable

## 2024-01-16 NOTE — ASSESSMENT & PLAN NOTE
- WOCN consulted  - best I can tell, not on any chronic Abx for this.  It seems she gets spot treated here and there and follows with outpt Wound Care regularly  - blood Cx now with Staph  - getting empiric Abx

## 2024-01-16 NOTE — NURSING
Pt arrived from the ER via stretcher on ventilator.  Drips infusing were amiodarone and diprivan to PIV.  Zosyn was infusing as well to PIV.  Vancomycin was handed off to infuse.  Pt unresponsive at this time.  Wound noted to left foot; skin tear noted to left forearm and sacrum redness noted.  Placed IP consult to wound care for AM.

## 2024-01-16 NOTE — ASSESSMENT & PLAN NOTE
Hep gtt ordered  Ischemia eval once awake and pending neuro status  Cont ICU supportive care  EKG with LBBB  No further VT reported  Cont Amio gtt, statin

## 2024-01-16 NOTE — SUBJECTIVE & OBJECTIVE
Interval History: No acute events.  Vent and hemodynamics stable.  Had just come off propofol at the time of my exam and less active than yesterday.      Objective:     Vital Signs (Most Recent):  Temp: 97.2 °F (36.2 °C) (01/16/24 0800)  Pulse: (!) 57 (01/16/24 0800)  Resp: (!) 24 (01/16/24 0800)  BP: 126/62 (01/16/24 0800)  SpO2: 100 % (01/16/24 0800) Vital Signs (24h Range):  Temp:  [97.2 °F (36.2 °C)-99.4 °F (37.4 °C)] 97.2 °F (36.2 °C)  Pulse:  [49-74] 57  Resp:  [20-45] 24  SpO2:  [76 %-100 %] 100 %  BP: (121-215)/() 126/62     Weight: 113.4 kg (250 lb)  Body mass index is 39.16 kg/m².      Intake/Output Summary (Last 24 hours) at 1/16/2024 0847  Last data filed at 1/16/2024 0823  Gross per 24 hour   Intake 5496.98 ml   Output 21 ml   Net 5475.98 ml        Physical Exam  Vitals and nursing note reviewed.   Constitutional:       General: She is not in acute distress.     Comments: Intubated and sedated   Cardiovascular:      Rate and Rhythm: Normal rate and regular rhythm.      Pulses: Normal pulses.      Heart sounds: No murmur heard.  Pulmonary:      Effort: Pulmonary effort is normal. No respiratory distress.      Breath sounds: No wheezing, rhonchi or rales.   Abdominal:      General: Abdomen is flat. There is no distension.      Palpations: Abdomen is soft.      Tenderness: There is no abdominal tenderness.   Musculoskeletal:      Right lower leg: No edema.      Comments: L foot wrapped in gauze dressing   Neurological:      Comments: Opens eyes spontaneously, no withdrawal to painful stimuli, weak cough           Review of Systems    Vents:  Vent Mode: A/C (01/16/24 0749)  Set Rate: 24 BPM (01/16/24 0749)  Vt Set: 450 mL (01/16/24 0749)  PEEP/CPAP: 5 cmH20 (01/16/24 0749)  Oxygen Concentration (%): 30 (01/16/24 0800)  Peak Airway Pressure: 22 cmH20 (01/16/24 0749)  Plateau Pressure: 19 cmH20 (01/16/24 0749)  Total Ve: 10.9 L/m (01/16/24 0749)  Negative Inspiratory Force (cm H2O): 0 (01/16/24  0749)  F/VT Ratio<105 (RSBI): (!) 52.75 (01/16/24 0749)    Lines/Drains/Airways       Central Venous Catheter Line  Duration                  Hemodialysis Catheter right subclavian -- days              Drain  Duration                  NG/OG Tube 01/15/24 1333 orogastric Center mouth <1 day         Urethral Catheter 01/15/24 1332 Latex;Double-lumen 16 Fr. <1 day              Airway  Duration                  Airway - Non-Surgical 01/15/24 Endotracheal Tube 1 day              Peripheral Intravenous Line  Duration                  Peripheral IV - Single Lumen 01/15/24 1302 18 G Left Antecubital <1 day         Peripheral IV - Single Lumen 01/15/24 1313 18 G Right Antecubital <1 day         Peripheral IV - Single Lumen 01/15/24 1425 20 G Posterior;Right Hand <1 day                    Significant Labs:    CBC/Anemia Profile:  Recent Labs   Lab 01/15/24  1308 01/16/24  0552   WBC 23.89* 15.69*   HGB 9.8* 8.6*   HCT 32.0* 27.6*    305   MCV 99* 95   RDW 14.3 14.3        Chemistries:  Recent Labs   Lab 01/15/24  1308 01/16/24  0552    134*   K 3.6 4.4    104   CO2 14* 19*   BUN 7* 16   CREATININE 2.4* 3.4*   CALCIUM 8.5* 8.2*   ALBUMIN 2.6* 2.3*   PROT 7.7 6.6   BILITOT 0.6 0.4   ALKPHOS 147* 132   ALT 62* 46*   AST 75* 45*   MG 2.2 1.6   PHOS 3.3 1.9*       All pertinent labs within the past 24 hours have been reviewed.    Significant Imaging:  I have reviewed all pertinent imaging results/findings within the past 24 hours.

## 2024-01-16 NOTE — PLAN OF CARE
Contacted [employer] to send patient's son, Andrew LeJeune, an emergency message via the [employer]. This could take up to 48-72 hours before message is delivered. [facility] will verify the emergency and relay the message to the appropriate  commander. Eligibility for emergency leave is determined by each s commander.     To check status of the request, call: [phone]

## 2024-01-16 NOTE — PLAN OF CARE
Problem: Infection  Goal: Absence of Infection Signs and Symptoms  Outcome: Ongoing, Progressing  Intervention: Prevent or Manage Infection  Flowsheets (Taken 1/16/2024 0402)  Fever Reduction/Comfort Measures:   lightweight bedding   lightweight clothing  Infection Management:   aseptic technique maintained   cultures obtained and sent to lab  Isolation Precautions:   precautions maintained   protective     Problem: Diabetes Comorbidity  Goal: Blood Glucose Level Within Targeted Range  Outcome: Ongoing, Progressing  Intervention: Monitor and Manage Glycemia  Flowsheets (Taken 1/16/2024 0402)  Glycemic Management:   blood glucose monitored   supplemental insulin given     Problem: Skin Injury Risk Increased  Goal: Skin Health and Integrity  Outcome: Ongoing, Progressing  Intervention: Optimize Skin Protection  Flowsheets (Taken 1/16/2024 0402)  Pressure Reduction Techniques: weight shift assistance provided  Pressure Reduction Devices:   foam padding utilized   pressure-redistributing mattress utilized   positioning supports utilized   heel offloading device utilized  Skin Protection:   tubing/devices free from skin contact   silicone foam dressing in place  Head of Bed (HOB) Positioning: HOB at 30-45 degrees

## 2024-01-16 NOTE — CONSULTS
"Pharmacokinetic Initial Assessment: IV Vancomycin    Assessment/Plan:    Initiate intravenous vancomycin with loading dose of 2000 mg once with subsequent doses when random concentrations are less than 25 mcg/mL prior to HD.   Desired empiric serum trough concentration is 10 to 20 mcg/mL  Draw vancomycin random level on 1/17/24 at 0600.  Pharmacy will continue to follow and monitor vancomycin.    Please contact pharmacy at extension [phone] for questions regarding this assessment.    Thank you for the consult,   Summer Song PharmD     Patient brief summary:  Sydney Lejeune is a 73 y.o. female initiated on antimicrobial therapy with IV Vancomycin for treatment of suspected sepsis    Drug Allergies:   Review of patient's allergies indicates:   Allergen Reactions    Tetanus vaccines and toxoid Nausea And Vomiting    Codeine Other (See Comments)     Upset stomach     Actual Body Weight: 113.4 kg    Renal Function:   Estimated Creatinine Clearance: 27.9 mL/min (A) (based on SCr of 2.4 mg/dL (H)).,     Dialysis Method (if applicable): Intermittent HD every MWF outpatient.     CBC (last 72 hours):  Recent Labs   Lab Result Units 01/15/24  1308   WBC K/uL 23.89*   Hemoglobin g/dL 9.8*   Hematocrit % 32.0*   Platelets K/uL 412   Gran % % 74.0*   Lymph % % 16.0*   Mono % % 8.0   Eosinophil % % 2.0   Basophil % % 0.0   Differential Method  Manual     Metabolic Panel (last 72 hours):  Recent Labs   Lab Result Units 01/15/24  1308 01/15/24  1311   Sodium mmol/L 136  --    Potassium mmol/L 3.6  --    Chloride mmol/L 105  --    CO2 mmol/L 14*  --    Glucose mg/dL 239*  --    Glucose, UA   --  Negative   BUN mg/dL 7*  --    Creatinine mg/dL 2.4*  --    Albumin g/dL 2.6*  --    Total Bilirubin mg/dL 0.6  --    Alkaline Phosphatase U/L 147*  --    AST U/L 75*  --    ALT U/L 62*  --    Magnesium mg/dL 2.2  --    Phosphorus mg/dL 3.3  --      Drug levels (last 3 results):  No results for input(s): "VANCOMYCINRA", "VANCORANDOM", " ""VANCOMYCINPE", "VANCOPEAK", "VANCOMYCINTR", "VANCOTROUGH" in the last 72 hours.    Microbiologic Results:  Microbiology Results (last 7 days)       Procedure Component Value Units Date/Time    Blood culture x two cultures. Draw prior to antibiotics. [[id number]] Collected: 01/15/24 1309    Order Status: Sent Specimen: Blood from Peripheral, Antecubital, Right Updated: 01/15/24 1802    Blood culture x two cultures. Draw prior to antibiotics. [[id number]] Collected: 01/15/24 1308    Order Status: Sent Specimen: Blood from Peripheral, Antecubital, Left Updated: 01/15/24 1802    Urine culture [[id number]] Collected: 01/15/24 1311    Order Status: No result Specimen: Urine Updated: 01/15/24 1355    Influenza A & B by Molecular [[id number]]  (Abnormal) Collected: 01/15/24 1311    Order Status: Completed Specimen: Nasopharyngeal Swab Updated: 01/15/24 1341     Influenza A, Molecular Negative     Influenza B, Molecular Positive     Flu A & B Source Nasal swab          Thank you for allowing us to participate in this patient's care.     Summer Song PharmD 01/15/2024 6:28 PM  "

## 2024-01-16 NOTE — PROGRESS NOTES
[facility] - Intensive Care ([facility])  Critical Care Medicine  Progress Note    Patient Name: Sydney Lejeune  MRN: [id number]  Admission Date: 1/15/2024  Hospital Length of Stay: 1 days  Code Status: Full Code  Attending Provider: David Barrios MD  Primary Care Provider: No, Primary Doctor   Principal Problem: <principal problem not specified>    Subjective:     HPI:  Ms Lejeune is a 74 y/o WF with ESRD, DM, HTN, and chronic left foot wound with osteo presents via EMS to the ER today after being found slumped over her steering wheel at a gas station.  History is taken from the medical record as the patient is unable to provide history and no family available at the time of my exam.  On EMS arrival, she was pulseless and apneic.  ROSC achieved following 6 rounds of CPR (Epi x 1 and 300mg of Amio).  She was intubated in the ER upon arrival.  Hemodynamics stable and low/mod vent requirements but ongoign short runs of NSVT.  Cardiology consulted in the ER.  Currently on amio gtt and propofol.  Initial CT Head unremarkable.  Labs notable for Lactic of 8, WBC 23.89, Hgb 9.8, K 3.6, BNP 1466, Trop 0.043, positive Flu B.  ABG shows metabolic acidosis with good respiratory compensation and pO2 505.  She is admitted to the ICU for post-arrest care.    Hospital/ICU Course:  1/15 - admitted to ICU following out of hospital cardiac arrest.  Minimal vent and hemodynamics stable.  1/16 - No acute events.  Rhythm stable.  Vent and hemodynamics stable.  Blood Cx GS with GPC in clusters.    Interval History: No acute events.  Vent and hemodynamics stable.  Had just come off propofol at the time of my exam and less active than yesterday.      Objective:     Vital Signs (Most Recent):  Temp: 97.2 °F (36.2 °C) (01/16/24 0800)  Pulse: (!) 57 (01/16/24 0800)  Resp: (!) 24 (01/16/24 0800)  BP: 126/62 (01/16/24 0800)  SpO2: 100 % (01/16/24 0800) Vital Signs (24h Range):  Temp:  [97.2 °F (36.2 °C)-99.4 °F (37.4 °C)] 97.2 °F (36.2 °C)  Pulse:   [49-74] 57  Resp:  [20-45] 24  SpO2:  [76 %-100 %] 100 %  BP: (121-215)/() 126/62     Weight: 113.4 kg (250 lb)  Body mass index is 39.16 kg/m².      Intake/Output Summary (Last 24 hours) at 1/16/2024 0847  Last data filed at 1/16/2024 0823  Gross per 24 hour   Intake 5496.98 ml   Output 21 ml   Net 5475.98 ml        Physical Exam  Vitals and nursing note reviewed.   Constitutional:       General: She is not in acute distress.     Comments: Intubated and sedated   Cardiovascular:      Rate and Rhythm: Normal rate and regular rhythm.      Pulses: Normal pulses.      Heart sounds: No murmur heard.  Pulmonary:      Effort: Pulmonary effort is normal. No respiratory distress.      Breath sounds: No wheezing, rhonchi or rales.   Abdominal:      General: Abdomen is flat. There is no distension.      Palpations: Abdomen is soft.      Tenderness: There is no abdominal tenderness.   Musculoskeletal:      Right lower leg: No edema.      Comments: L foot wrapped in gauze dressing   Neurological:      Comments: Opens eyes spontaneously, no withdrawal to painful stimuli, weak cough           Review of Systems    Vents:  Vent Mode: A/C (01/16/24 0749)  Set Rate: 24 BPM (01/16/24 0749)  Vt Set: 450 mL (01/16/24 0749)  PEEP/CPAP: 5 cmH20 (01/16/24 0749)  Oxygen Concentration (%): 30 (01/16/24 0800)  Peak Airway Pressure: 22 cmH20 (01/16/24 0749)  Plateau Pressure: 19 cmH20 (01/16/24 0749)  Total Ve: 10.9 L/m (01/16/24 0749)  Negative Inspiratory Force (cm H2O): 0 (01/16/24 0749)  F/VT Ratio<105 (RSBI): (!) 52.75 (01/16/24 0749)    Lines/Drains/Airways       Central Venous Catheter Line  Duration                  Hemodialysis Catheter right subclavian -- days              Drain  Duration                  NG/OG Tube 01/15/24 1333 orogastric Center mouth <1 day         Urethral Catheter 01/15/24 1332 Latex;Double-lumen 16 Fr. <1 day              Airway  Duration                  Airway - Non-Surgical 01/15/24 Endotracheal Tube  1 day              Peripheral Intravenous Line  Duration                  Peripheral IV - Single Lumen 01/15/24 1302 18 G Left Antecubital <1 day         Peripheral IV - Single Lumen 01/15/24 1313 18 G Right Antecubital <1 day         Peripheral IV - Single Lumen 01/15/24 1425 20 G Posterior;Right Hand <1 day                    Significant Labs:    CBC/Anemia Profile:  Recent Labs   Lab 01/15/24  1308 01/16/24  0552   WBC 23.89* 15.69*   HGB 9.8* 8.6*   HCT 32.0* 27.6*    305   MCV 99* 95   RDW 14.3 14.3        Chemistries:  Recent Labs   Lab 01/15/24  1308 01/16/24  0552    134*   K 3.6 4.4    104   CO2 14* 19*   BUN 7* 16   CREATININE 2.4* 3.4*   CALCIUM 8.5* 8.2*   ALBUMIN 2.6* 2.3*   PROT 7.7 6.6   BILITOT 0.6 0.4   ALKPHOS 147* 132   ALT 62* 46*   AST 75* 45*   MG 2.2 1.6   PHOS 3.3 1.9*       All pertinent labs within the past 24 hours have been reviewed.    Significant Imaging:  I have reviewed all pertinent imaging results/findings within the past 24 hours.    ABG  Recent Labs   Lab 01/15/24  1315   PH 7.336*   PO2 505*   PCO2 27.0*   HCO3 14.4*   BE -11*     Assessment/Plan:     Cardiac/Vascular  HTN (hypertension)  - holding home meds acutely  - will restart, as necessary    Cardiac arrest  - etiology unclear, initially though Cardiac most likely given runs of NSVT and good oxgenation, but now blood cultures are showing Staph  - empiric Abx  - Cardiology following  - amio gtt per Cardiology  - replete electrolytes cautiously given ESRD  - Echo with mildly decreased LVEF  - Cardiac monitoring  - avoid fever  - initial CT unremarkable    Renal/  ESRD (end stage renal disease)  - MWF via left chest vascath  - no acute indication  - consult Nephrology today for routine dialysis while she is here  - renally dose meds and avoid nephrotoxins    ID  Influenza B  - likely an incidental finding given her excellent oxygenation  - will hold off on Tamiflu given ESRD  - Droplet precautions  -  monitor    Chronic osteomyelitis  - WOCN consulted  - best I can tell, not on any chronic Abx for this.  It seems she gets spot treated here and there and follows with outpt Wound Care regularly  - blood Cx now with Staph  - getting empiric Abx    Endocrine  Type 2 diabetes mellitus with kidney complication, with long-term current use of insulin  - SSI/Accuchecks      Critical Care Time: 38 minutes  Critical secondary to respiratory failure, cardiac arrest      Critical care was time spent personally by me on the following activities: development of treatment plan with patient or surrogate and bedside caregivers, discussions with consultants, evaluation of patient's response to treatment, examination of patient, ordering and performing treatments and interventions, ordering and review of laboratory studies, ordering and review of radiographic studies, pulse oximetry, re-evaluation of patient's condition. This critical care time did not overlap with that of any other provider or involve time for any procedures.     David Barrios MD  Critical Care Medicine  [facility] - Intensive Care ([facility])

## 2024-01-16 NOTE — SUBJECTIVE & OBJECTIVE
Past Medical History:   Diagnosis Date    Diabetes mellitus     Diabetes mellitus with stage 4 chronic kidney disease     Hypertension     Kidney stones     MRSA (methicillin resistant staph aureus) culture positive     Renal disorder        Past Surgical History:   Procedure Laterality Date    FOOT SURGERY      kidney stent placement         Review of patient's allergies indicates:   Allergen Reactions    Tetanus vaccines and toxoid Nausea And Vomiting    Codeine Other (See Comments)     Upset stomach       No current facility-administered medications on file prior to encounter.     Current Outpatient Medications on File Prior to Encounter   Medication Sig    amlodipine (NORVASC) 10 MG tablet Take 10 mg by mouth once daily.    furosemide (LASIX) 40 MG tablet Take 40 mg by mouth 2 (two) times daily.    insulin aspart protamine-insulin aspart (NOVOLOG 70/30) 100 unit/mL (70-30) InPn pen Inject 28 Units into the skin 2 (two) times daily before meals.     losartan (COZAAR) 25 MG tablet Take 25 mg by mouth once daily.    metoprolol tartrate (LOPRESSOR) 100 MG tablet Take 100 mg by mouth 2 (two) times daily.    ondansetron (ZOFRAN) 4 MG tablet Take 1 tablet (4 mg total) by mouth every 8 (eight) hours as needed.    oxycodone-acetaminophen (PERCOCET)  mg per tablet Take 1 tablet by mouth every 4 (four) hours as needed for Pain.    pravastatin (PRAVACHOL) 40 MG tablet Take 40 mg by mouth once daily.    promethazine (PHENERGAN) 25 MG tablet Take 1 tablet (25 mg total) by mouth every 6 (six) hours as needed for Nausea.     Family History       Problem Relation (Age of Onset)    Hypertension Mother, Father          Tobacco Use    Smoking status: Former    Smokeless tobacco: Not on file   Substance and Sexual Activity    Alcohol use: No    Drug use: No    Sexual activity: Yes     Review of Systems   Reason unable to perform ROS: acuity.     Objective:     Vital Signs (Most Recent):  Temp: 97.2 °F (36.2 °C) (01/16/24  0923)  Pulse: 65 (01/16/24 0923)  Resp: (!) 24 (01/16/24 0923)  BP: 126/62 (01/16/24 0800)  SpO2: 100 % (01/16/24 0923) Vital Signs (24h Range):  Temp:  [97.2 °F (36.2 °C)-99.4 °F (37.4 °C)] 97.2 °F (36.2 °C)  Pulse:  [49-74] 65  Resp:  [20-45] 24  SpO2:  [76 %-100 %] 100 %  BP: (121-215)/() 126/62     Weight: 113.4 kg (250 lb)  Body mass index is 39.16 kg/m².    SpO2: 100 %         Intake/Output Summary (Last 24 hours) at 1/16/2024 0935  Last data filed at 1/16/2024 0823  Gross per 24 hour   Intake 5496.98 ml   Output 21 ml   Net 5475.98 ml       Lines/Drains/Airways       Central Venous Catheter Line  Duration                  Hemodialysis Catheter right subclavian -- days              Drain  Duration                  NG/OG Tube 01/15/24 1333 orogastric Center mouth <1 day         Urethral Catheter 01/15/24 1332 Latex;Double-lumen 16 Fr. <1 day              Airway  Duration                  Airway - Non-Surgical 01/15/24 Endotracheal Tube 1 day              Peripheral Intravenous Line  Duration                  Peripheral IV - Single Lumen 01/15/24 1302 18 G Left Antecubital <1 day         Peripheral IV - Single Lumen 01/15/24 1313 18 G Right Antecubital <1 day         Peripheral IV - Single Lumen 01/15/24 1425 20 G Posterior;Right Hand <1 day                     Physical Exam     Significant Labs: BMP:   Recent Labs   Lab 01/15/24  1308 01/16/24  0552   * 245*    134*   K 3.6 4.4    104   CO2 14* 19*   BUN 7* 16   CREATININE 2.4* 3.4*   CALCIUM 8.5* 8.2*   MG 2.2 1.6   , CMP   Recent Labs   Lab 01/15/24  1308 01/16/24  0552    134*   K 3.6 4.4    104   CO2 14* 19*   * 245*   BUN 7* 16   CREATININE 2.4* 3.4*   CALCIUM 8.5* 8.2*   PROT 7.7 6.6   ALBUMIN 2.6* 2.3*   BILITOT 0.6 0.4   ALKPHOS 147* 132   AST 75* 45*   ALT 62* 46*   ANIONGAP 17* 11   , CBC   Recent Labs   Lab 01/15/24  1308 01/16/24  0552   WBC 23.89* 15.69*   HGB 9.8* 8.6*   HCT 32.0* 27.6*    305   ,  "INR   Recent Labs   Lab 01/15/24  1308   INR 1.1   , Lipid Panel No results for input(s): "CHOL", "HDL", "LDLCALC", "TRIG", "CHOLHDL" in the last 48 hours., Troponin   Recent Labs   Lab 01/15/24  1833 01/16/24  0055 01/16/24  0552   TROPONINI 3.106* 4.966* 3.522*   , and All pertinent lab results from the last 24 hours have been reviewed.    Significant Imaging: Cardiac Cath: reviewed, Echocardiogram: Transthoracic echo (TTE) complete (Cupid Only):   Results for orders placed or performed during the hospital encounter of 01/15/24   Echo   Result Value Ref Range    BSA 2.32 m2    LVOT stroke volume 88.23 cm3    LVIDd 4.33 3.5 - 6.0 cm    LV Systolic Volume 52.79 mL    LV Systolic Volume Index 23.8 mL/m2    LVIDs 3.55 2.1 - 4.0 cm    LV Diastolic Volume 84.55 mL    LV Diastolic Volume Index 38.09 mL/m2    IVS 1.46 (A) 0.6 - 1.1 cm    LVOT diameter 2.00 cm    LVOT area 3.1 cm2    FS 18 (A) 28 - 44 %    Left Ventricle Relative Wall Thickness 0.85 cm    Posterior Wall 1.83 (A) 0.6 - 1.1 cm    LV mass 301.17 g    LV Mass Index 136 g/m2    MV Peak E Cody 0.95 m/s    TDI LATERAL 0.04 m/s    MV Peak A Cody 1.22 m/s    TR Max Cody 1.29 m/s    E/A ratio 0.78     IVRT 102.76 msec    E wave deceleration time 249.44 msec    LV LATERAL E/E' RATIO 23.75 m/s    LVOT peak cody 1.26 m/s    Left Ventricular Outflow Tract Mean Velocity 0.88 cm/s    Left Ventricular Outflow Tract Mean Gradient 3.42 mmHg    RVDD 3.54 cm    RV S' 12.74 cm/s    RVOT peak VTI 16.9 cm    TAPSE 1.92 cm    LA size 3.69 cm    Left Atrium Minor Axis 6.80 cm    Left Atrium Major Axis 6.39 cm    RA Major Axis 6.30 cm    AV regurgitation pressure 1/2 time [id number] ms    AR Max Cody 4.43 m/s    AV mean gradient 11 mmHg    AV peak gradient 20 mmHg    Ao peak cody 2.22 m/s    Ao VTI 54.00 cm    LVOT peak VTI 28.10 cm    AV valve area 1.63 cm²    AV Velocity Ratio 0.57     AV index (prosthetic) 0.52     DB by Velocity Ratio 1.78 cm²    MV stenosis pressure 1/2 " time 72.34 ms    MV valve area p 1/2 method 3.04 cm2    Triscuspid Valve Regurgitation Peak Gradient 7 mmHg    PV mean gradient 1 mmHg    PV PEAK VELOCITY 1.14 m/s    PV peak gradient 5 mmHg    Pulmonary Valve Mean Velocity 0.67 m/s    RVOT peak philippe 0.82 m/s    Ao root annulus 2.91 cm    STJ 2.51 cm    Ascending aorta 2.68 cm    IVC diameter 2.66 cm    ZLVIDS -2.23     ZLVIDD -5.83     LA Volume Index 36.3 mL/m2    LA volume 80.59 cm3    LA WIDTH 3.9 cm    RA Width 2.9 cm    TV resting pulmonary artery pressure 22 mmHg    RV TB RVSP 16 mmHg    Est. RA pres 15 mmHg    Narrative      Left Ventricle: The left ventricle is normal in size. Normal wall   thickness. There is concentric hypertrophy. Mild global hypokinesis   present. There is mildly reduced systolic function with a visually   estimated ejection fraction of 45 - 50%. There is diastolic dysfunction   but grade cannot be determined. Elevated left ventricular filling   pressure.    Right Ventricle: Normal right ventricular cavity size. Wall thickness   is normal. Right ventricle wall motion  is normal. Systolic function is   borderline low.    Left Atrium: Left atrium is mildly dilated.    Aortic Valve: Mildly calcified cusps. There is mild annular dilation   present. There is mild stenosis. Aortic valve area by VTI is 1.63 cm².   Aortic valve peak velocity is 2.22 m/s. Mean gradient is 11 mmHg. The   dimensionless index is 0.52. There is moderate aortic regurgitation.    Mitral Valve: There is moderate mitral annular calcification present.   There is mild regurgitation.    Tricuspid Valve: There is mild regurgitation.    Pulmonary Artery: The estimated pulmonary artery systolic pressure is   22 mmHg.    IVC/SVC: Elevated venous pressure at 15 mmHg.    Pericardium: There is a small circumferential effusion. No indication   of cardiac tamponade.    Supine/ventilated, STAT ER echo     , EKG: reviewed, Stress Test: reviewed, and X-Ray: CXR: X-Ray Chest 1 View  (CXR): No results found for this visit on 01/15/24.

## 2024-01-16 NOTE — HOSPITAL COURSE
1/15 - admitted to ICU following out of hospital cardiac arrest.  Minimal vent and hemodynamics stable.  1/16 - No acute events.  Rhythm stable.  Vent and hemodynamics stable.  Blood Cx GS with GPC in clusters.  1/17 - No acute events.  Vent and hemodynamics stable.  Neuro exam largely unchanged.  1/18 - No acute events.  Vent and hemodynamics stable.  Neuro exam largely unchanged.  1/19 - No significant changes.  EEG showed severe diffuse encephalopathy.  1/20 - No significant changes.  Reports of some more L sided movement overnight, but nothing for me.  1/21 - Vent and hemodynamics stable.  Neuro exam unchanged.  Remains on PSV.  1/22 - vent and hemodynamics stable.  Neuro exam unchanged.  Remains on PSV.  1/23- transitioned to comfort

## 2024-01-16 NOTE — ASSESSMENT & PLAN NOTE
- MWF via left chest vascath  - no acute indication  - consult Nephrology today for routine dialysis while she is here  - renally dose meds and avoid nephrotoxins

## 2024-01-16 NOTE — HPI
Ms Lejeune is a 74 y/o WF with ESRD, DM, HTN, and chronic left foot wound with osteo presents via EMS to the ER today after being found slumped over her steering wheel at a gas station.  History is taken from the medical record as the patient is unable to provide history and no family available at the time of my exam.  On EMS arrival, she was pulseless and apneic.  ROSC achieved following 6 rounds of CPR (Epi x 1 and 300mg of Amio).  She was intubated in the ER upon arrival.  Hemodynamics stable and low/mod vent requirements but ongoign short runs of NSVT. Currently on amio gtt and propofol.  Initial CT Head unremarkable.  Labs notable for Lactic of 8, WBC 23.89, Hgb 9.8, K 3.6, BNP 1466, Trop 0.043, positive Flu B.  ABG shows metabolic acidosis with good respiratory compensation and pO2 505.  She is admitted to the ICU for post-arrest care.    Cardiology consulted to assist with management. Chart reviewed, pt on isolation for Flu, pt intubated/sedated. No more VT noted per chart review. Discussed care with ICU MD. Labs reviewed, troponin trending down 4.0-3.5, H/H 8 and 27, Crt 3.4, Blood cultures positive for staph. Ech EF 45-50% mild AS. Pt was schedule for shunt sx 1/18 per chart review

## 2024-01-16 NOTE — ASSESSMENT & PLAN NOTE
- MWF via left chest vascath  - no acute indication  - will consult Nephrology in the AM for routine dialysis  - renally dose meds and avoid nephrotoxins

## 2024-01-17 PROBLEM — R78.81 MRSA BACTEREMIA: Status: ACTIVE | Noted: 2024-01-17

## 2024-01-17 PROBLEM — B95.62 MRSA BACTEREMIA: Status: ACTIVE | Noted: 2024-01-17

## 2024-01-17 LAB
ALBUMIN SERPL BCP-MCNC: 2.3 G/DL (ref 3.5–5.2)
ALLENS TEST: ABNORMAL
ALP SERPL-CCNC: 139 U/L (ref 55–135)
ALT SERPL W/O P-5'-P-CCNC: 41 U/L (ref 10–44)
ANION GAP SERPL CALC-SCNC: 13 MMOL/L (ref 8–16)
APTT PPP: 40.1 SEC (ref 21–32)
AST SERPL-CCNC: 35 U/L (ref 10–40)
BASOPHILS # BLD AUTO: 0.08 K/UL (ref 0–0.2)
BASOPHILS # BLD AUTO: 0.08 K/UL (ref 0–0.2)
BASOPHILS NFR BLD: 0.5 % (ref 0–1.9)
BASOPHILS NFR BLD: 0.5 % (ref 0–1.9)
BILIRUB SERPL-MCNC: 0.3 MG/DL (ref 0.1–1)
BUN SERPL-MCNC: 23 MG/DL (ref 8–23)
CALCIUM SERPL-MCNC: 8.5 MG/DL (ref 8.7–10.5)
CHLORIDE SERPL-SCNC: 102 MMOL/L (ref 95–110)
CO2 SERPL-SCNC: 17 MMOL/L (ref 23–29)
CREAT SERPL-MCNC: 4.6 MG/DL (ref 0.5–1.4)
DELSYS: ABNORMAL
DIFFERENTIAL METHOD BLD: ABNORMAL
DIFFERENTIAL METHOD BLD: ABNORMAL
EOSINOPHIL # BLD AUTO: 0.3 K/UL (ref 0–0.5)
EOSINOPHIL # BLD AUTO: 0.3 K/UL (ref 0–0.5)
EOSINOPHIL NFR BLD: 1.6 % (ref 0–8)
EOSINOPHIL NFR BLD: 1.6 % (ref 0–8)
ERYTHROCYTE [DISTWIDTH] IN BLOOD BY AUTOMATED COUNT: 14.6 % (ref 11.5–14.5)
ERYTHROCYTE [DISTWIDTH] IN BLOOD BY AUTOMATED COUNT: 14.6 % (ref 11.5–14.5)
ERYTHROCYTE [SEDIMENTATION RATE] IN BLOOD BY WESTERGREN METHOD: 24 MM/H
EST. GFR  (NO RACE VARIABLE): 10 ML/MIN/1.73 M^2
FIO2: 30
GLUCOSE SERPL-MCNC: 243 MG/DL (ref 70–110)
HCO3 UR-SCNC: 16.8 MMOL/L (ref 24–28)
HCT VFR BLD AUTO: 32.3 % (ref 37–48.5)
HCT VFR BLD AUTO: 32.3 % (ref 37–48.5)
HGB BLD-MCNC: 10.4 G/DL (ref 12–16)
HGB BLD-MCNC: 10.4 G/DL (ref 12–16)
IMM GRANULOCYTES # BLD AUTO: 0.2 K/UL (ref 0–0.04)
IMM GRANULOCYTES # BLD AUTO: 0.2 K/UL (ref 0–0.04)
IMM GRANULOCYTES NFR BLD AUTO: 1.2 % (ref 0–0.5)
IMM GRANULOCYTES NFR BLD AUTO: 1.2 % (ref 0–0.5)
LYMPHOCYTES # BLD AUTO: 1.5 K/UL (ref 1–4.8)
LYMPHOCYTES # BLD AUTO: 1.5 K/UL (ref 1–4.8)
LYMPHOCYTES NFR BLD: 8.9 % (ref 18–48)
LYMPHOCYTES NFR BLD: 8.9 % (ref 18–48)
MAGNESIUM SERPL-MCNC: 2.3 MG/DL (ref 1.6–2.6)
MCH RBC QN AUTO: 30.2 PG (ref 27–31)
MCH RBC QN AUTO: 30.2 PG (ref 27–31)
MCHC RBC AUTO-ENTMCNC: 32.2 G/DL (ref 32–36)
MCHC RBC AUTO-ENTMCNC: 32.2 G/DL (ref 32–36)
MCV RBC AUTO: 94 FL (ref 82–98)
MCV RBC AUTO: 94 FL (ref 82–98)
MODE: ABNORMAL
MONOCYTES # BLD AUTO: 1.2 K/UL (ref 0.3–1)
MONOCYTES # BLD AUTO: 1.2 K/UL (ref 0.3–1)
MONOCYTES NFR BLD: 7.1 % (ref 4–15)
MONOCYTES NFR BLD: 7.1 % (ref 4–15)
NEUTROPHILS # BLD AUTO: 13.9 K/UL (ref 1.8–7.7)
NEUTROPHILS # BLD AUTO: 13.9 K/UL (ref 1.8–7.7)
NEUTROPHILS NFR BLD: 80.7 % (ref 38–73)
NEUTROPHILS NFR BLD: 80.7 % (ref 38–73)
NRBC BLD-RTO: 0 /100 WBC
NRBC BLD-RTO: 0 /100 WBC
PCO2 BLDA: 23.4 MMHG (ref 35–45)
PEEP: 5
PH SMN: 7.46 [PH] (ref 7.35–7.45)
PHOSPHATE SERPL-MCNC: 4.4 MG/DL (ref 2.7–4.5)
PLATELET # BLD AUTO: 356 K/UL (ref 150–450)
PLATELET # BLD AUTO: 356 K/UL (ref 150–450)
PMV BLD AUTO: 9.1 FL (ref 9.2–12.9)
PMV BLD AUTO: 9.1 FL (ref 9.2–12.9)
PO2 BLDA: 125 MMHG (ref 80–100)
POC BE: -7 MMOL/L
POC SATURATED O2: 99 % (ref 95–100)
POCT GLUCOSE: 205 MG/DL (ref 70–110)
POCT GLUCOSE: 207 MG/DL (ref 70–110)
POCT GLUCOSE: 217 MG/DL (ref 70–110)
POCT GLUCOSE: 247 MG/DL (ref 70–110)
POTASSIUM SERPL-SCNC: 4.5 MMOL/L (ref 3.5–5.1)
PROT SERPL-MCNC: 7.2 G/DL (ref 6–8.4)
RBC # BLD AUTO: 3.44 M/UL (ref 4–5.4)
RBC # BLD AUTO: 3.44 M/UL (ref 4–5.4)
SAMPLE: ABNORMAL
SITE: ABNORMAL
SODIUM SERPL-SCNC: 132 MMOL/L (ref 136–145)
TROPONIN I SERPL DL<=0.01 NG/ML-MCNC: 1.54 NG/ML (ref 0–0.03)
VANCOMYCIN SERPL-MCNC: 18.3 UG/ML
VT: 450
WBC # BLD AUTO: 17.22 K/UL (ref 3.9–12.7)
WBC # BLD AUTO: 17.22 K/UL (ref 3.9–12.7)

## 2024-01-17 PROCEDURE — 63600175 PHARM REV CODE 636 W HCPCS: Performed by: INTERNAL MEDICINE

## 2024-01-17 PROCEDURE — 99223 1ST HOSP IP/OBS HIGH 75: CPT | Mod: NSCH,,, | Performed by: INTERNAL MEDICINE

## 2024-01-17 PROCEDURE — 85025 COMPLETE CBC W/AUTO DIFF WBC: CPT | Performed by: INTERNAL MEDICINE

## 2024-01-17 PROCEDURE — 94003 VENT MGMT INPAT SUBQ DAY: CPT

## 2024-01-17 PROCEDURE — 27000207 HC ISOLATION

## 2024-01-17 PROCEDURE — 83735 ASSAY OF MAGNESIUM: CPT | Performed by: INTERNAL MEDICINE

## 2024-01-17 PROCEDURE — 93010 ELECTROCARDIOGRAM REPORT: CPT | Mod: ,,, | Performed by: INTERNAL MEDICINE

## 2024-01-17 PROCEDURE — 36415 COLL VENOUS BLD VENIPUNCTURE: CPT | Performed by: INTERNAL MEDICINE

## 2024-01-17 PROCEDURE — 87040 BLOOD CULTURE FOR BACTERIA: CPT | Mod: 59 | Performed by: INTERNAL MEDICINE

## 2024-01-17 PROCEDURE — 99900026 HC AIRWAY MAINTENANCE (STAT)

## 2024-01-17 PROCEDURE — 63600175 PHARM REV CODE 636 W HCPCS

## 2024-01-17 PROCEDURE — 25000003 PHARM REV CODE 250: Performed by: INTERNAL MEDICINE

## 2024-01-17 PROCEDURE — 82803 BLOOD GASES ANY COMBINATION: CPT

## 2024-01-17 PROCEDURE — 80202 ASSAY OF VANCOMYCIN: CPT | Performed by: INTERNAL MEDICINE

## 2024-01-17 PROCEDURE — 27100171 HC OXYGEN HIGH FLOW UP TO 24 HOURS

## 2024-01-17 PROCEDURE — 80053 COMPREHEN METABOLIC PANEL: CPT | Performed by: INTERNAL MEDICINE

## 2024-01-17 PROCEDURE — 84100 ASSAY OF PHOSPHORUS: CPT | Performed by: INTERNAL MEDICINE

## 2024-01-17 PROCEDURE — 99233 SBSQ HOSP IP/OBS HIGH 50: CPT | Mod: 25,,, | Performed by: PHYSICIAN ASSISTANT

## 2024-01-17 PROCEDURE — 36600 WITHDRAWAL OF ARTERIAL BLOOD: CPT

## 2024-01-17 PROCEDURE — 20000000 HC ICU ROOM

## 2024-01-17 PROCEDURE — 99900035 HC TECH TIME PER 15 MIN (STAT)

## 2024-01-17 PROCEDURE — 85730 THROMBOPLASTIN TIME PARTIAL: CPT

## 2024-01-17 PROCEDURE — 63600175 PHARM REV CODE 636 W HCPCS: Performed by: EMERGENCY MEDICINE

## 2024-01-17 PROCEDURE — 99291 CRITICAL CARE FIRST HOUR: CPT | Mod: ,,, | Performed by: INTERNAL MEDICINE

## 2024-01-17 PROCEDURE — 93005 ELECTROCARDIOGRAM TRACING: CPT

## 2024-01-17 PROCEDURE — 94761 N-INVAS EAR/PLS OXIMETRY MLT: CPT

## 2024-01-17 RX ORDER — AMIODARONE HYDROCHLORIDE 200 MG/1
200 TABLET ORAL DAILY
Status: DISCONTINUED | OUTPATIENT
Start: 2024-01-17 | End: 2024-01-19

## 2024-01-17 RX ADMIN — FAMOTIDINE 20 MG: 40 POWDER, FOR SUSPENSION ORAL at 08:01

## 2024-01-17 RX ADMIN — AMIODARONE HYDROCHLORIDE 0.5 MG/MIN: 1.8 INJECTION, SOLUTION INTRAVENOUS at 06:01

## 2024-01-17 RX ADMIN — CHLORHEXIDINE GLUCONATE 0.12% ORAL RINSE 15 ML: 1.2 LIQUID ORAL at 08:01

## 2024-01-17 RX ADMIN — PRAVASTATIN SODIUM 40 MG: 20 TABLET ORAL at 08:01

## 2024-01-17 RX ADMIN — MUPIROCIN: 20 OINTMENT TOPICAL at 08:01

## 2024-01-17 RX ADMIN — MEROPENEM 500 MG: 500 INJECTION INTRAVENOUS at 08:01

## 2024-01-17 RX ADMIN — INSULIN DETEMIR 10 UNITS: 100 INJECTION, SOLUTION SUBCUTANEOUS at 10:01

## 2024-01-17 RX ADMIN — INSULIN ASPART 4 UNITS: 100 INJECTION, SOLUTION INTRAVENOUS; SUBCUTANEOUS at 06:01

## 2024-01-17 RX ADMIN — CEFTRIAXONE SODIUM 2 G: 2 INJECTION, POWDER, FOR SOLUTION INTRAMUSCULAR; INTRAVENOUS at 10:01

## 2024-01-17 RX ADMIN — AMIODARONE HYDROCHLORIDE 200 MG: 200 TABLET ORAL at 10:01

## 2024-01-17 RX ADMIN — INSULIN ASPART 2 UNITS: 100 INJECTION, SOLUTION INTRAVENOUS; SUBCUTANEOUS at 11:01

## 2024-01-17 RX ADMIN — INSULIN ASPART 4 UNITS: 100 INJECTION, SOLUTION INTRAVENOUS; SUBCUTANEOUS at 01:01

## 2024-01-17 RX ADMIN — HEPARIN SODIUM 12 UNITS/KG/HR: 10000 INJECTION, SOLUTION INTRAVENOUS at 05:01

## 2024-01-17 NOTE — ASSESSMENT & PLAN NOTE
- initially thought Cardiac most likely given runs of NSVT and good oxgenation, but now blood cultures are showing Staph (though no evidence of true septic shock given stable hemodynamics)  - empiric Abx  - Cardiology following  - amio gtt per Cardiology  - replete electrolytes cautiously given ESRD  - Echo with mildly decreased LVEF  - Cardiac monitoring  - avoid fever  - initial CT Head unremarkable

## 2024-01-17 NOTE — PLAN OF CARE
Pt remains on vent and off sedation. No purposeful response to stimuli. Cough and corneal reflexes present. Remains on amio gtt. NSR on heart monitor. OG to LIWS.  0-10ml/hr UOP per smiley. Will continue to monitor.

## 2024-01-17 NOTE — PLAN OF CARE
Nutrition Recommendations 1/17:  1. Initiate pt onto Enteral nutrition, Recommend Novasource renal via NG/OG tube, goal rate 40 mL/hr, starting at 10 mL/hr, then advance to goal within 24 hrs if pt is tolerating or per MD/NP   -Formula at goal rate provides: 1920 kcals/day (102% EEN), 87 g protein/day (80% EPN), 176 g CHO/day (75% CHO needs), 688 mL free formula water/day   -135 mL q4h free water flushes (810 mL/day) = total from formula + FWF = 1498 mL water/day, per MD/NP   -Check Mg, K+, Na, Phos and Glu before and during initiation, correct as indicated   2. Recommend John BID for wound healing   3. Weigh twice weekly  4. Collaboration with other providers     Goals:   1. Pt will be initiated onto EN within 24 hrs   2. Pt will tolerate and intake > 75% EEN and EPN prior to RD follow up   3. Pt will intake John BID prior to RD follow up    Helen Rangel, Registration Eligible, Provisional LDN

## 2024-01-17 NOTE — CONSULTS
Pharmacokinetic Assessment Follow Up: IV Vancomycin    Vancomycin serum concentration assessment(s):    The random level was drawn correctly and can be used to guide therapy at this time. The measurement is within the desired definitive target range of 15 to 20 mcg/mL.    Vancomycin Regimen Plan:    No Vancomycin dose due today because patient will not receive HD today.     Re-dose when the random level is less than 25 mcg/mL prior to HD, next level to be drawn at 0430 on 1/18/24.    Drug levels (last 3 results):  Recent Labs   Lab Result Units 01/17/24  0430   Vancomycin, Random ug/mL 18.3     Pharmacy will continue to follow and monitor vancomycin.    Please contact pharmacy at extension [phone] for questions regarding this assessment.    Thank you for the consult,   Summer Song PharmD       Patient brief summary:  Sydney Lejeune is a 73 y.o. female initiated on antimicrobial therapy with IV Vancomycin for treatment of bacteremia    The patient's current regimen is pulse dosing (dosing by level) per HD protocol when random level is less than 25 mcg/mL prior to HD.     Drug Allergies:   Review of patient's allergies indicates:   Allergen Reactions    Tetanus vaccines and toxoid Nausea And Vomiting    Codeine Other (See Comments)     Upset stomach     Actual Body Weight: 108.9 kg    Renal Function:   Estimated Creatinine Clearance: 14.3 mL/min (A) (based on SCr of 4.6 mg/dL (H)).,     Dialysis Method (if applicable): intermittent HD    CBC (last 72 hours):  Recent Labs   Lab Result Units 01/15/24  1308 01/16/24  0552 01/17/24  0430   WBC K/uL 23.89* 15.69* 17.22*  17.22*   Hemoglobin g/dL 9.8* 8.6* 10.4*  10.4*   Hematocrit % 32.0* 27.6* 32.3*  32.3*   Platelets K/uL 412 305 356  356   Gran % % 74.0* 83.5* 80.7*  80.7*   Lymph % % 16.0* 8.7* 8.9*  8.9*   Mono % % 8.0 6.5 7.1  7.1   Eosinophil % % 2.0 0.2 1.6  1.6   Basophil % % 0.0 0.3 0.5  0.5   Differential Method  Manual Automated Automated  Automated        Metabolic Panel (last 72 hours):  Recent Labs   Lab Result Units 01/15/24  1308 01/15/24  1311 01/16/24  0552 01/17/24  0430   Sodium mmol/L 136  --  134* 132*   Potassium mmol/L 3.6  --  4.4 4.5   Chloride mmol/L 105  --  104 102   CO2 mmol/L 14*  --  19* 17*   Glucose mg/dL 239*  --  245* 243*   Glucose, UA   --  Negative  --   --    BUN mg/dL 7*  --  16 23   Creatinine mg/dL 2.4*  --  3.4* 4.6*   Albumin g/dL 2.6*  --  2.3* 2.3*   Total Bilirubin mg/dL 0.6  --  0.4 0.3   Alkaline Phosphatase U/L 147*  --  132 139*   AST U/L 75*  --  45* 35   ALT U/L 62*  --  46* 41   Magnesium mg/dL 2.2  --  1.6 2.3   Phosphorus mg/dL 3.3  --  1.9* 4.4       Vancomycin Administrations:  vancomycin given in the last 96 hours                     vancomycin 2 g in dextrose 5 % 500 mL IVPB (mg) 2,000 mg New Bag 01/15/24 1805                    Microbiologic Results:  Microbiology Results (last 7 days)       Procedure Component Value Units Date/Time    Blood culture [[id number]] Collected: 01/17/24 0909    Order Status: Sent Specimen: Blood Updated: 01/17/24 1728    Blood culture [[id number]] Collected: 01/17/24 0909    Order Status: Sent Specimen: Blood from Antecubital, Right Hand Updated: 01/17/24 1728    Blood culture x two cultures. Draw prior to antibiotics. [[id number]]  (Abnormal) Collected: 01/15/24 1308    Order Status: Completed Specimen: Blood from Peripheral, Antecubital, Left Updated: 01/17/24 0943     Blood Culture, Routine Gram stain aer bottle: Gram positive cocci in clusters resembling Staph      Results called to and read back by: Gladis Ricci RN 01/16/2024  08:16      Gram stain olegario bottle: Gram positive cocci in clusters resembling Staph      Positive results previously called 01/16/2024  10:29      STAPHYLOCOCCUS AUREUS  ID consult required at [facility].Mayte Gold and Jarrett bruno.  For susceptibility see order #[id number]      Narrative:      Aerobic and anaerobic    Blood culture x two cultures. Draw  prior to antibiotics. [[id number]]  (Abnormal) Collected: 01/15/24 1309    Order Status: Completed Specimen: Blood from Peripheral, Antecubital, Right Updated: 01/17/24 0941     Blood Culture, Routine Gram stain aer bottle: Gram positive cocci in clusters resembling Staph      Results called to and read back by: Gladis Ricci RN 01/16/2024  08:16      Gram stain olegario bottle: Gram positive cocci in clusters resembling Staph      Positive results previously called 01/16/2024  10:29      STAPHYLOCOCCUS AUREUS  Susceptibility pending  ID consult required at [facility].      Narrative:      Aerobic and anaerobic    Urine culture [[id number]]  (Abnormal) Collected: 01/15/24 1311    Order Status: Completed Specimen: Urine Updated: 01/16/24 1844     Urine Culture, Routine STAPHYLOCOCCUS AUREUS  > 100,000 cfu/ml  Susceptibility pending      Narrative:      Specimen Source->Urine    Rapid Organism ID by PCR (from Blood culture) [[id number]]  (Abnormal) Collected: 01/15/24 1309    Order Status: Completed Updated: 01/16/24 0931     Enterococcus faecalis Not Detected     Enterococcus faecium Not Detected     Listeria monocytogenes Not Detected     Staphylococcus spp. See species for ID     Staphylococcus aureus Detected     Staphylococcus epidermidis Not Detected     Staphylococcus lugdunensis Not Detected     Streptococcus species Not Detected     Streptococcus agalactiae Not Detected     Streptococcus pneumoniae Not Detected     Streptococcus pyogenes Not Detected     Acinetobacter calcoaceticus/baumannii complex Not Detected     Bacteroides fragilis Not Detected     Enterobacterales Not Detected     Enterobacter cloacae complex Not Detected     Escherichia coli Not Detected     Klebsiella aerogenes Not Detected     Klebsiella oxytoca Not Detected     Klebsiella pneumoniae group Not Detected     Proteus Not Detected     Salmonella sp Not Detected     Serratia marcescens Not Detected     Haemophilus  influenzae Not Detected     Neisseria meningtidis Not Detected     Pseudomonas aeruginosa Not Detected     Stenotrophomonas maltophilia Not Detected     Candida albicans Not Detected     Candida auris Not Detected     Candida glabrata Not Detected     Candida krusei Not Detected     Candida parapsilosis Not Detected     Candida tropicalis Not Detected     Cryptococcus neoformans/gattii Not Detected     CTX-M (ESBL ) Test Not Applicable     IMP (Carbapenem resistant) Test Not Applicable     KPC resistance gene (Carbapenem resistant) Test Not Applicable     mcr-1  Test Not Applicable     mec A/C  Test Not Applicable     mec A/C and MREJ (MRSA) gene Detected     NDM (Carbapenem resistant) Test Not Applicable     OXA-48-like (Carbapenem resistant) Test Not Applicable     van A/B (VRE gene) Test Not Applicable     VIM (Carbapenem resistant) Test Not Applicable    Narrative:      Aerobic and anaerobic    Influenza A & B by Molecular [[id number]]  (Abnormal) Collected: 01/15/24 1311    Order Status: Completed Specimen: Nasopharyngeal Swab Updated: 01/15/24 1341     Influenza A, Molecular Negative     Influenza B, Molecular Positive     Flu A & B Source Nasal swab          Thank you for allowing us to participate in this patient's care.     Summer Song PharmD 01/17/2024 6:00 PM

## 2024-01-17 NOTE — ASSESSMENT & PLAN NOTE
Occurred outside the hospital   Prolonged LOC  Chances of recovery less, risk for anoxic encephalopathy  Awaiting 72 hours post event to obtain neurologic evaluation

## 2024-01-17 NOTE — PROGRESS NOTES
[facility] - [facility]ensive Care ([facility])  Cardiology  Progress Note    Patient Name: Sydney Lejeune  MRN: [id number]  Admission Date: 1/15/2024  Hospital Length of Stay: 2 days  Code Status: Full Code   Attending Physician: David Barrios MD   Primary Care Physician: No, Primary Doctor  Expected Discharge Date:   Principal Problem:<principal problem not specified>    Subjective:   HPI:  Ms Lejeune is a 72 y/o WF with ESRD, DM, HTN, and chronic left foot wound with osteo presents via EMS to the ER today after being found slumped over her steering wheel at a gas station.  History is taken from the medical record as the patient is unable to provide history and no family available at the time of my exam.  On EMS arrival, she was pulseless and apneic.  ROSC achieved following 6 rounds of CPR (Epi x 1 and 300mg of Amio).  She was intubated in the ER upon arrival.  Hemodynamics stable and low/mod vent requirements but ongoign short runs of NSVT. Currently on amio gtt and propofol.  Initial CT Head unremarkable.  Labs notable for Lactic of 8, WBC 23.89, Hgb 9.8, K 3.6, BNP 1466, Trop 0.043, positive Flu B.  ABG shows metabolic acidosis with good respiratory compensation and pO2 505.  She is admitted to the ICU for post-arrest care.     Cardiology consulted to assist with management. Chart reviewed, pt on isolation for Flu, pt intubated/sedated. No more VT noted per chart review. Discussed care with ICU MD. Labs reviewed, troponin trending down 4.0-3.5, H/H 8 and 27, Crt 3.4, Blood cultures positive for staph. Ech EF 45-50% mild AS. Pt was schedule for shunt sx 1/18 per chart review       Hospital Course:   1/17/24-Chart reviewed. Patient remains intubated/critically ill. No purposeful movements/responses per nursing note. No arrhythmias documented. Labs reviewed. Creatinine 4.6. H/H stable. BC + for MRSA, on abx. Troponin trending down.      Review of Systems   Unable to perform ROS: Acuity of condition     Objective:     Vital  Signs (Most Recent):  Temp: 99 °F (37.2 °C) (01/17/24 0700)  Pulse: 71 (01/17/24 0906)  Resp: (!) 24 (01/17/24 0906)  BP: (!) 160/65 (01/17/24 0906)  SpO2: 100 % (01/17/24 0906) Vital Signs (24h Range):  Temp:  [97.7 °F (36.5 °C)-99.2 °F (37.3 °C)] 99 °F (37.2 °C)  Pulse:  [65-76] 71  Resp:  [20-26] 24  SpO2:  [100 %] 100 %  BP: (156-178)/(61-79) 160/65     Weight: 108.9 kg (240 lb 1.3 oz)  Body mass index is 37.6 kg/m².     SpO2: 100 %         Intake/Output Summary (Last 24 hours) at 1/17/2024 1045  Last data filed at 1/17/2024 0800  Gross per 24 hour   Intake 742.7 ml   Output 385 ml   Net 357.7 ml       Lines/Drains/Airways       Central Venous Catheter Line  Duration                  Hemodialysis Catheter right subclavian -- days              Drain  Duration                  NG/OG Tube 01/15/24 1333 orogastric Center mouth 1 day         Urethral Catheter 01/15/24 1332 Latex;Double-lumen 16 Fr. 1 day              Airway  Duration                  Airway - Non-Surgical 01/15/24 Endotracheal Tube 2 days              Peripheral Intravenous Line  Duration                  Peripheral IV - Single Lumen 01/15/24 1302 18 G Left Antecubital 1 day         Peripheral IV - Single Lumen 01/15/24 1313 18 G Right Antecubital 1 day         Peripheral IV - Single Lumen 01/15/24 1425 20 G Posterior;Right Hand 1 day                       Physical Exam  Vitals and nursing note reviewed.            Significant Labs: CMP   Recent Labs   Lab 01/15/24  1308 01/16/24  0552 01/17/24  0430    134* 132*   K 3.6 4.4 4.5    104 102   CO2 14* 19* 17*   * 245* 243*   BUN 7* 16 23   CREATININE 2.4* 3.4* 4.6*   CALCIUM 8.5* 8.2* 8.5*   PROT 7.7 6.6 7.2   ALBUMIN 2.6* 2.3* 2.3*   BILITOT 0.6 0.4 0.3   ALKPHOS 147* 132 139*   AST 75* 45* 35   ALT 62* 46* 41   ANIONGAP 17* 11 13   , CBC   Recent Labs   Lab 01/15/24  1308 01/16/24  0552 01/17/24  0430   WBC 23.89* 15.69* 17.22*  17.22*   HGB 9.8* 8.6* 10.4*  10.4*   HCT 32.0*  27.6* 32.3*  32.3*    305 356  356   , Troponin   Recent Labs   Lab 01/16/24  1059 01/16/24  1710 01/16/24  2327   TROPONINI 2.515* 2.018* 1.537*   , and All pertinent lab results from the last 24 hours have been reviewed.    Significant Imaging: Echocardiogram: Transthoracic echo (TTE) complete (Cupid Only):   Results for orders placed or performed during the hospital encounter of 01/15/24   Echo   Result Value Ref Range    BSA 2.32 m2    LVOT stroke volume 88.23 cm3    LVIDd 4.33 3.5 - 6.0 cm    LV Systolic Volume 52.79 mL    LV Systolic Volume Index 23.8 mL/m2    LVIDs 3.55 2.1 - 4.0 cm    LV Diastolic Volume 84.55 mL    LV Diastolic Volume Index 38.09 mL/m2    IVS 1.46 (A) 0.6 - 1.1 cm    LVOT diameter 2.00 cm    LVOT area 3.1 cm2    FS 18 (A) 28 - 44 %    Left Ventricle Relative Wall Thickness 0.85 cm    Posterior Wall 1.83 (A) 0.6 - 1.1 cm    LV mass 301.17 g    LV Mass Index 136 g/m2    MV Peak E Cody 0.95 m/s    TDI LATERAL 0.04 m/s    MV Peak A Cody 1.22 m/s    TR Max Cody 1.29 m/s    E/A ratio 0.78     IVRT 102.76 msec    E wave deceleration time 249.44 msec    LV LATERAL E/E' RATIO 23.75 m/s    LVOT peak cody 1.26 m/s    Left Ventricular Outflow Tract Mean Velocity 0.88 cm/s    Left Ventricular Outflow Tract Mean Gradient 3.42 mmHg    RVDD 3.54 cm    RV S' 12.74 cm/s    RVOT peak VTI 16.9 cm    TAPSE 1.92 cm    LA size 3.69 cm    Left Atrium Minor Axis 6.80 cm    Left Atrium Major Axis 6.39 cm    RA Major Axis 6.30 cm    AV regurgitation pressure 1/2 time [id number] ms    AR Max Cody 4.43 m/s    AV mean gradient 11 mmHg    AV peak gradient 20 mmHg    Ao peak cody 2.22 m/s    Ao VTI 54.00 cm    LVOT peak VTI 28.10 cm    AV valve area 1.63 cm²    AV Velocity Ratio 0.57     AV index (prosthetic) 0.52     DB by Velocity Ratio 1.78 cm²    MV stenosis pressure 1/2 time 72.34 ms    MV valve area p 1/2 method 3.04 cm2    Triscuspid Valve Regurgitation Peak Gradient 7 mmHg    PV mean gradient 1 mmHg     PV PEAK VELOCITY 1.14 m/s    PV peak gradient 5 mmHg    Pulmonary Valve Mean Velocity 0.67 m/s    RVOT peak philippe 0.82 m/s    Ao root annulus 2.91 cm    STJ 2.51 cm    Ascending aorta 2.68 cm    IVC diameter 2.66 cm    ZLVIDS -2.23     ZLVIDD -5.83     LA Volume Index 36.3 mL/m2    LA volume 80.59 cm3    LA WIDTH 3.9 cm    RA Width 2.9 cm    TV resting pulmonary artery pressure 22 mmHg    RV TB RVSP 16 mmHg    Est. RA pres 15 mmHg    Narrative      Left Ventricle: The left ventricle is normal in size. Normal wall   thickness. There is concentric hypertrophy. Mild global hypokinesis   present. There is mildly reduced systolic function with a visually   estimated ejection fraction of 45 - 50%. There is diastolic dysfunction   but grade cannot be determined. Elevated left ventricular filling   pressure.    Right Ventricle: Normal right ventricular cavity size. Wall thickness   is normal. Right ventricle wall motion  is normal. Systolic function is   borderline low.    Left Atrium: Left atrium is mildly dilated.    Aortic Valve: Mildly calcified cusps. There is mild annular dilation   present. There is mild stenosis. Aortic valve area by VTI is 1.63 cm².   Aortic valve peak velocity is 2.22 m/s. Mean gradient is 11 mmHg. The   dimensionless index is 0.52. There is moderate aortic regurgitation.    Mitral Valve: There is moderate mitral annular calcification present.   There is mild regurgitation.    Tricuspid Valve: There is mild regurgitation.    Pulmonary Artery: The estimated pulmonary artery systolic pressure is   22 mmHg.    IVC/SVC: Elevated venous pressure at 15 mmHg.    Pericardium: There is a small circumferential effusion. No indication   of cardiac tamponade.    Supine/ventilated, STAT ER echo     , EKG: Reviewed, and X-Ray: CXR: X-Ray Chest 1 View (CXR): No results found for this visit on 01/15/24. and X-Ray Chest PA and Lateral (CXR): No results found for this visit on 01/15/24.  Assessment and Plan:    Patient who presents s/p cardiac arrest. Remains ill/intubated. Troponin trending down. BC + for staph, on abx. Continue abx/supportive care as tolerated. Ischemic evaluation pending stability/recovery.    MRSA bacteremia  -Mgmt as per critical care  -On abx    Influenza B  -Mgmt as per critical care    HTN (hypertension)  Titrate meds    ESRD (end stage renal disease)  -Mgmt as per nephrology    Cardiac arrest  Hep gtt ordered  Ischemia eval once awake and pending neuro status  Cont ICU supportive care  EKG with LBBB  No further VT reported  Cont Amio gtt, statin    1/17/24  -Continue OMT-amio gtt, statin  -ASA 81 mg if able to tolerate  -Remains critically ill  -Ischemic evaluation pending stability/recovery        VTE Risk Mitigation (From admission, onward)           Ordered     heparin 25,000 units in dextrose 5% (100 units/ml) IV bolus from bag - ADDITIONAL PRN BOLUS - 60 units/kg (max bolus 4000 units)  As needed (PRN)        Question:  Heparin Infusion Adjustment (DO NOT MODIFY ANSWER)  Answer:  \\[email or web address]\epic\Images\Pharmacy\HeparinInfusions\heparin LOW INTENSITY nomogram for [facility] [id number]    01/16/24 0937     heparin 25,000 units in dextrose 5% (100 units/ml) IV bolus from bag - ADDITIONAL PRN BOLUS - 30 units/kg (max bolus 4000 units)  As needed (PRN)        Question:  Heparin Infusion Adjustment (DO NOT MODIFY ANSWER)  Answer:  \\[email or web address]\epic\Images\Pharmacy\HeparinInfusions\heparin LOW INTENSITY nomogram for [facility] [id number].pdf    01/16/24 0937     heparin 25,000 units in dextrose 5% 250 mL (100 units/mL) infusion LOW INTENSITY nomogram - [facility]  Continuous        Question:  Begin at (units/kg/hr)  Answer:  12    01/16/24 0937     IP VTE HIGH RISK PATIENT  Once         01/15/24 1748     Place sequential compression device  Until discontinued         01/15/24 1748                    Sherly Valenzuela PA-C  Cardiology  [facility] - [facility] ([facility])

## 2024-01-17 NOTE — PROGRESS NOTES
[facility] - Intensive Care ([facility])  Critical Care Medicine  Progress Note    Patient Name: Sydney Lejeune  MRN: [id number]  Admission Date: 1/15/2024  Hospital Length of Stay: 2 days  Code Status: Full Code  Attending Provider: David Barrios MD  Primary Care Provider: No, Primary Doctor   Principal Problem: <principal problem not specified>    Subjective:     HPI:  Ms Lejeune is a 74 y/o WF with ESRD, DM, HTN, and chronic left foot wound with osteo presents via EMS to the ER today after being found slumped over her steering wheel at a gas station.  History is taken from the medical record as the patient is unable to provide history and no family available at the time of my exam.  On EMS arrival, she was pulseless and apneic.  ROSC achieved following 6 rounds of CPR (Epi x 1 and 300mg of Amio).  She was intubated in the ER upon arrival.  Hemodynamics stable and low/mod vent requirements but ongoign short runs of NSVT.  Cardiology consulted in the ER.  Currently on amio gtt and propofol.  Initial CT Head unremarkable.  Labs notable for Lactic of 8, WBC 23.89, Hgb 9.8, K 3.6, BNP 1466, Trop 0.043, positive Flu B.  ABG shows metabolic acidosis with good respiratory compensation and pO2 505.  She is admitted to the ICU for post-arrest care.    Hospital/ICU Course:  1/15 - admitted to ICU following out of hospital cardiac arrest.  Minimal vent and hemodynamics stable.  1/16 - No acute events.  Rhythm stable.  Vent and hemodynamics stable.  Blood Cx GS with GPC in clusters.  1/17 - No acute events.  Vent and hemodynamics stable.  Neuro exam largely unchanged.    No new subjective & objective note has been filed under this hospital service since the last note was generated.      ABG  Recent Labs   Lab 01/17/24  0334   PH 7.465*   PO2 125*   PCO2 23.4*   HCO3 16.8*   BE -7*     Assessment/Plan:     Cardiac/Vascular  HTN (hypertension)  - holding home meds acutely  - will restart, as necessary    Cardiac arrest  -  initially thought Cardiac most likely given runs of NSVT and good oxgenation, but now blood cultures are showing Staph (though no evidence of true septic shock given stable hemodynamics)  - empiric Abx  - Cardiology following  - amio gtt per Cardiology  - replete electrolytes cautiously given ESRD  - Echo with mildly decreased LVEF  - Cardiac monitoring  - avoid fever  - initial CT Head unremarkable    Renal/  ESRD (end stage renal disease)  - MWF via left chest vascath  - no acute indication  - Nephrology consulted; RRT per Nephro  - renally dose meds and avoid nephrotoxins    ID  MRSA bacteremia  - Urine and Blood from 1/15 positive for MRSA  - repeat cultures today  - continue Vanc, empiric Rocephin  - consult ID  - hemodynamics stable    Influenza B  - likely an incidental finding given her excellent oxygenation  - will hold off on Tamiflu given ESRD  - Droplet precautions  - monitor    Chronic osteomyelitis  - WOCN consulted  - best I can tell, not on any chronic Abx for this.  It seems she gets spot treated here and there and follows with outpt Wound Care regularly  - blood Cx now with MRSA  - getting broad spectrum Abx    Endocrine  Type 2 diabetes mellitus with kidney complication, with long-term current use of insulin  - SSI/Accuchecks      Critical Care Time: 36 minutes  Critical secondary to respiratory failure, cardiac arrest      Critical care was time spent personally by me on the following activities: development of treatment plan with patient or surrogate and bedside caregivers, discussions with consultants, evaluation of patient's response to treatment, examination of patient, ordering and performing treatments and interventions, ordering and review of laboratory studies, ordering and review of radiographic studies, pulse oximetry, re-evaluation of patient's condition. This critical care time did not overlap with that of any other provider or involve time for any procedures.     David Barrios,  MD  Critical Care Medicine  [facility] - Intensive Care ([facility])
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
## 2024-01-17 NOTE — ASSESSMENT & PLAN NOTE
- Urine and Blood from 1/15 positive for MRSA  - repeat cultures 1/17  - continue Vanc, empiric Merrem  - consulted ID  - hemodynamics stable
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
## 2024-01-17 NOTE — HOSPITAL COURSE
1/17/24-Chart reviewed. Patient remains intubated/critically ill. No purposeful movements/responses per nursing note. No arrhythmias documented. Labs reviewed. Creatinine 4.6. H/H stable. BC + for MRSA, on abx.     1/18/24-Chart reviewed. No change in CV status. No arrhythmias, on po amiodarone. ASA initiated. No purposeful responses to stimuli per documentation. Labs reviewed.     1/19/24-Chat reviewed. Increased runs of VT/ectopy. Amiodarone gtt resumed. Labs reviewed. K 3.9. Mg 2.1. Remains intubated. No purposeful responses per chart.

## 2024-01-17 NOTE — SUBJECTIVE & OBJECTIVE
Past Medical History:   Diagnosis Date    Diabetes mellitus     Diabetes mellitus with stage 4 chronic kidney disease     Hypertension     Kidney stones     MRSA (methicillin resistant staph aureus) culture positive     Renal disorder        Past Surgical History:   Procedure Laterality Date    FOOT SURGERY      kidney stent placement         Review of patient's allergies indicates:   Allergen Reactions    Tetanus vaccines and toxoid Nausea And Vomiting    Codeine Other (See Comments)     Upset stomach     Current Facility-Administered Medications   Medication Frequency    amiodarone tablet 200 mg Daily    calcium gluconate 1 g in NS IVPB (premixed) PRN    calcium gluconate 1 g in NS IVPB (premixed) PRN    calcium gluconate 1 g in NS IVPB (premixed) PRN    chlorhexidine 0.12 % solution 15 mL BID    dextrose 10% bolus 125 mL 125 mL PRN    dextrose 10% bolus 250 mL 250 mL PRN    famotidine 40 mg/5 mL (8 mg/mL) suspension 20 mg Daily    glucagon (human recombinant) injection 1 mg PRN    heparin 25,000 units in dextrose 5% (100 units/ml) IV bolus from bag - ADDITIONAL PRN BOLUS - 30 units/kg (max bolus 4000 units) PRN    heparin 25,000 units in dextrose 5% (100 units/ml) IV bolus from bag - ADDITIONAL PRN BOLUS - 60 units/kg (max bolus 4000 units) PRN    heparin 25,000 units in dextrose 5% 250 mL (100 units/mL) infusion LOW INTENSITY nomogram - [facility] Continuous    hydrALAZINE injection 10 mg Q6H PRN    insulin aspart U-100 pen 0-10 Units Q6H PRN    insulin detemir U-100 (Levemir) pen 10 Units Daily    magnesium sulfate 2g in water 50mL IVPB (premix) PRN    magnesium sulfate 2g in water 50mL IVPB (premix) PRN    meropenem (MERREM) 500 mg in sodium chloride 0.9 % 100 mL IVPB (MB+) Q24H    mupirocin 2 % ointment BID    potassium chloride 10 mEq in 100 mL IVPB PRN    And    potassium chloride 10 mEq in 100 mL IVPB PRN    And    potassium chloride 10 mEq in 100 mL IVPB PRN    pravastatin tablet 40 mg Daily    sodium chloride  0.9% flush 10 mL PRN    vancomycin - pharmacy to dose pharmacy to manage frequency     Family History       Problem Relation (Age of Onset)    Hypertension Mother, Father          Tobacco Use    Smoking status: Former    Smokeless tobacco: Not on file   Substance and Sexual Activity    Alcohol use: No    Drug use: No    Sexual activity: Yes     Review of Systems   Unable to perform ROS: Intubated     Objective:     Vital Signs (Most Recent):  Temp: 99 °F (37.2 °C) (01/17/24 1100)  Pulse: 73 (01/17/24 1728)  Resp: 18 (01/17/24 1728)  BP: 136/61 (01/17/24 1715)  SpO2: 100 % (01/17/24 1728) Vital Signs (24h Range):  Temp:  [98.2 °F (36.8 °C)-99.2 °F (37.3 °C)] 99 °F (37.2 °C)  Pulse:  [67-76] 73  Resp:  [18-26] 18  SpO2:  [100 %] 100 %  BP: (136-178)/(57-79) 136/61     Weight: 108.9 kg (240 lb 1.3 oz) (01/17/24 1018)  Body mass index is 37.6 kg/m².  Body surface area is 2.27 meters squared.    I/O last 3 completed shifts:  In: 1691 [I.V.:1067.4; IV Piggyback:623.6]  Out: 400 [Urine:100; Drains:300]     Physical Exam  Vitals and nursing note reviewed.   Constitutional:       Appearance: Normal appearance. She is obese. She is ill-appearing.   Cardiovascular:      Rate and Rhythm: Normal rate and regular rhythm.      Pulses: Normal pulses.      Heart sounds: Normal heart sounds.   Pulmonary:      Breath sounds: No rales.      Comments: Intubated   Abdominal:      Palpations: Abdomen is soft.   Musculoskeletal:      Right lower leg: No edema.      Left lower leg: No edema.          Significant Labs: reviewed  BMP  Lab Results   Component Value Date     (L) 01/17/2024    K 4.5 01/17/2024     01/17/2024    CO2 17 (L) 01/17/2024    BUN 23 01/17/2024    CREATININE 4.6 (H) 01/17/2024    CALCIUM 8.5 (L) 01/17/2024    ANIONGAP 13 01/17/2024    EGFRNORACEVR 10 (A) 01/17/2024     Lab Results   Component Value Date    WBC 17.22 (H) 01/17/2024    WBC 17.22 (H) 01/17/2024    HGB 10.4 (L) 01/17/2024    HGB 10.4 (L)  01/17/2024    HCT 32.3 (L) 01/17/2024    HCT 32.3 (L) 01/17/2024    MCV 94 01/17/2024    MCV 94 01/17/2024     01/17/2024     01/17/2024       ABG  Recent Labs   Lab 01/17/24  0334   PH 7.465*   PO2 125*   PCO2 23.4*   HCO3 16.8*   BE -7*       Significant Imaging: reviewed CXR, has mild pulmonary edema    Head CT: no sign of acute ischemic event

## 2024-01-17 NOTE — SUBJECTIVE & OBJECTIVE
Review of Systems   Unable to perform ROS: Acuity of condition     Objective:     Vital Signs (Most Recent):  Temp: 99 °F (37.2 °C) (01/17/24 0700)  Pulse: 71 (01/17/24 0906)  Resp: (!) 24 (01/17/24 0906)  BP: (!) 160/65 (01/17/24 0906)  SpO2: 100 % (01/17/24 0906) Vital Signs (24h Range):  Temp:  [97.7 °F (36.5 °C)-99.2 °F (37.3 °C)] 99 °F (37.2 °C)  Pulse:  [65-76] 71  Resp:  [20-26] 24  SpO2:  [100 %] 100 %  BP: (156-178)/(61-79) 160/65     Weight: 108.9 kg (240 lb 1.3 oz)  Body mass index is 37.6 kg/m².     SpO2: 100 %         Intake/Output Summary (Last 24 hours) at 1/17/2024 1045  Last data filed at 1/17/2024 0800  Gross per 24 hour   Intake 742.7 ml   Output 385 ml   Net 357.7 ml       Lines/Drains/Airways       Central Venous Catheter Line  Duration                  Hemodialysis Catheter right subclavian -- days              Drain  Duration                  NG/OG Tube 01/15/24 1333 orogastric Center mouth 1 day         Urethral Catheter 01/15/24 1332 Latex;Double-lumen 16 Fr. 1 day              Airway  Duration                  Airway - Non-Surgical 01/15/24 Endotracheal Tube 2 days              Peripheral Intravenous Line  Duration                  Peripheral IV - Single Lumen 01/15/24 1302 18 G Left Antecubital 1 day         Peripheral IV - Single Lumen 01/15/24 1313 18 G Right Antecubital 1 day         Peripheral IV - Single Lumen 01/15/24 1425 20 G Posterior;Right Hand 1 day                       Physical Exam  Vitals and nursing note reviewed.            Significant Labs: CMP   Recent Labs   Lab 01/15/24  1308 01/16/24  0552 01/17/24  0430    134* 132*   K 3.6 4.4 4.5    104 102   CO2 14* 19* 17*   * 245* 243*   BUN 7* 16 23   CREATININE 2.4* 3.4* 4.6*   CALCIUM 8.5* 8.2* 8.5*   PROT 7.7 6.6 7.2   ALBUMIN 2.6* 2.3* 2.3*   BILITOT 0.6 0.4 0.3   ALKPHOS 147* 132 139*   AST 75* 45* 35   ALT 62* 46* 41   ANIONGAP 17* 11 13   , CBC   Recent Labs   Lab 01/15/24  1308 01/16/24  0552  01/17/24  0430   WBC 23.89* 15.69* 17.22*  17.22*   HGB 9.8* 8.6* 10.4*  10.4*   HCT 32.0* 27.6* 32.3*  32.3*    305 356  356   , Troponin   Recent Labs   Lab 01/16/24  1059 01/16/24  1710 01/16/24  2327   TROPONINI 2.515* 2.018* 1.537*   , and All pertinent lab results from the last 24 hours have been reviewed.    Significant Imaging: Echocardiogram: Transthoracic echo (TTE) complete (Cupid Only):   Results for orders placed or performed during the hospital encounter of 01/15/24   Echo   Result Value Ref Range    BSA 2.32 m2    LVOT stroke volume 88.23 cm3    LVIDd 4.33 3.5 - 6.0 cm    LV Systolic Volume 52.79 mL    LV Systolic Volume Index 23.8 mL/m2    LVIDs 3.55 2.1 - 4.0 cm    LV Diastolic Volume 84.55 mL    LV Diastolic Volume Index 38.09 mL/m2    IVS 1.46 (A) 0.6 - 1.1 cm    LVOT diameter 2.00 cm    LVOT area 3.1 cm2    FS 18 (A) 28 - 44 %    Left Ventricle Relative Wall Thickness 0.85 cm    Posterior Wall 1.83 (A) 0.6 - 1.1 cm    LV mass 301.17 g    LV Mass Index 136 g/m2    MV Peak E Cody 0.95 m/s    TDI LATERAL 0.04 m/s    MV Peak A Cody 1.22 m/s    TR Max Cody 1.29 m/s    E/A ratio 0.78     IVRT 102.76 msec    E wave deceleration time 249.44 msec    LV LATERAL E/E' RATIO 23.75 m/s    LVOT peak cody 1.26 m/s    Left Ventricular Outflow Tract Mean Velocity 0.88 cm/s    Left Ventricular Outflow Tract Mean Gradient 3.42 mmHg    RVDD 3.54 cm    RV S' 12.74 cm/s    RVOT peak VTI 16.9 cm    TAPSE 1.92 cm    LA size 3.69 cm    Left Atrium Minor Axis 6.80 cm    Left Atrium Major Axis 6.39 cm    RA Major Axis 6.30 cm    AV regurgitation pressure 1/2 time [id number] ms    AR Max Cody 4.43 m/s    AV mean gradient 11 mmHg    AV peak gradient 20 mmHg    Ao peak cody 2.22 m/s    Ao VTI 54.00 cm    LVOT peak VTI 28.10 cm    AV valve area 1.63 cm²    AV Velocity Ratio 0.57     AV index (prosthetic) 0.52     DB by Velocity Ratio 1.78 cm²    MV stenosis pressure 1/2 time 72.34 ms    MV valve area p 1/2 method  3.04 cm2    Triscuspid Valve Regurgitation Peak Gradient 7 mmHg    PV mean gradient 1 mmHg    PV PEAK VELOCITY 1.14 m/s    PV peak gradient 5 mmHg    Pulmonary Valve Mean Velocity 0.67 m/s    RVOT peak philippe 0.82 m/s    Ao root annulus 2.91 cm    STJ 2.51 cm    Ascending aorta 2.68 cm    IVC diameter 2.66 cm    ZLVIDS -2.23     ZLVIDD -5.83     LA Volume Index 36.3 mL/m2    LA volume 80.59 cm3    LA WIDTH 3.9 cm    RA Width 2.9 cm    TV resting pulmonary artery pressure 22 mmHg    RV TB RVSP 16 mmHg    Est. RA pres 15 mmHg    Narrative      Left Ventricle: The left ventricle is normal in size. Normal wall   thickness. There is concentric hypertrophy. Mild global hypokinesis   present. There is mildly reduced systolic function with a visually   estimated ejection fraction of 45 - 50%. There is diastolic dysfunction   but grade cannot be determined. Elevated left ventricular filling   pressure.    Right Ventricle: Normal right ventricular cavity size. Wall thickness   is normal. Right ventricle wall motion  is normal. Systolic function is   borderline low.    Left Atrium: Left atrium is mildly dilated.    Aortic Valve: Mildly calcified cusps. There is mild annular dilation   present. There is mild stenosis. Aortic valve area by VTI is 1.63 cm².   Aortic valve peak velocity is 2.22 m/s. Mean gradient is 11 mmHg. The   dimensionless index is 0.52. There is moderate aortic regurgitation.    Mitral Valve: There is moderate mitral annular calcification present.   There is mild regurgitation.    Tricuspid Valve: There is mild regurgitation.    Pulmonary Artery: The estimated pulmonary artery systolic pressure is   22 mmHg.    IVC/SVC: Elevated venous pressure at 15 mmHg.    Pericardium: There is a small circumferential effusion. No indication   of cardiac tamponade.    Supine/ventilated, STAT ER echo     , EKG: Reviewed, and X-Ray: CXR: X-Ray Chest 1 View (CXR): No results found for this visit on 01/15/24. and X-Ray Chest PA  and Lateral (CXR): No results found for this visit on 01/15/24.

## 2024-01-17 NOTE — PLAN OF CARE
Pt SAT today. Propofol left off. + cough, gag, not breathing over the vent. Some flexion to pain. Amiodarone gtt infusing. Electrolytes replaced. POC reviewed with family.

## 2024-01-17 NOTE — ASSESSMENT & PLAN NOTE
- WOCN consulted  - best I can tell, not on any chronic Abx for this.  It seems she gets spot treated here and there and follows with outpt Wound Care regularly  - blood Cx now with MRSA  - getting broad spectrum Abx

## 2024-01-17 NOTE — ASSESSMENT & PLAN NOTE
Hep gtt ordered  Ischemia eval once awake and pending neuro status  Cont ICU supportive care  EKG with LBBB  No further VT reported  Cont Amio gtt, statin    1/17/24  -Continue OMT-amio gtt, statin  -ASA 81 mg if able to tolerate  -Remains critically ill  -Ischemic evaluation pending stability/recovery

## 2024-01-17 NOTE — SUBJECTIVE & OBJECTIVE
Interval History: No acute events.  Vent and hemodynamics stable.  Blood cultures with MRSA.  Neuro exam largely unchanged.      Objective:     Vital Signs (Most Recent):  Temp: 99 °F (37.2 °C) (01/17/24 0700)  Pulse: 69 (01/17/24 0730)  Resp: 20 (01/17/24 0730)  BP: (!) 163/63 (01/17/24 0715)  SpO2: 100 % (01/17/24 0730) Vital Signs (24h Range):  Temp:  [96.8 °F (36 °C)-99.2 °F (37.3 °C)] 99 °F (37.2 °C)  Pulse:  [57-76] 69  Resp:  [20-26] 20  SpO2:  [100 %] 100 %  BP: (126-178)/(61-79) 163/63     Weight: 108.9 kg (240 lb 1.3 oz)  Body mass index is 37.6 kg/m².      Intake/Output Summary (Last 24 hours) at 1/17/2024 0754  Last data filed at 1/17/2024 0600  Gross per 24 hour   Intake 770.18 ml   Output 385 ml   Net 385.18 ml        Physical Exam  Vitals and nursing note reviewed.   Constitutional:       General: She is not in acute distress.     Comments: Intubated, no sedation   Cardiovascular:      Rate and Rhythm: Normal rate and regular rhythm.      Pulses: Normal pulses.      Heart sounds: No murmur heard.  Pulmonary:      Effort: Pulmonary effort is normal. No respiratory distress.      Breath sounds: No wheezing, rhonchi or rales.   Abdominal:      General: Abdomen is flat. There is no distension.      Palpations: Abdomen is soft.      Tenderness: There is no abdominal tenderness.   Musculoskeletal:      Right lower leg: Edema present.      Left lower leg: Edema present.      Comments: Left foot with Mepilex dressing on the sole   Neurological:      Comments: No response to voice.  No withdrawal to pain.  Right/upward gaze preference.  Pupillary reflex intact.  Corneals intact.  Weak cough.             Review of Systems    Vents:  Vent Mode: A/C (01/17/24 0712)  Set Rate: 20 BPM (01/17/24 0712)  Vt Set: 450 mL (01/17/24 0712)  PEEP/CPAP: 5 cmH20 (01/17/24 0712)  Oxygen Concentration (%): 30 (01/17/24 0730)  Peak Airway Pressure: 20 cmH20 (01/17/24 0712)  Plateau Pressure: 18 cmH20 (01/17/24 0712)  Total Ve:  9.3 L/m (01/17/24 0712)  Negative Inspiratory Force (cm H2O): 0 (01/17/24 0712)  F/VT Ratio<105 (RSBI): (!) 46.15 (01/17/24 0712)    Lines/Drains/Airways       Central Venous Catheter Line  Duration                  Hemodialysis Catheter right subclavian -- days              Drain  Duration                  NG/OG Tube 01/15/24 1333 orogastric Center mouth 1 day         Urethral Catheter 01/15/24 1332 Latex;Double-lumen 16 Fr. 1 day              Airway  Duration                  Airway - Non-Surgical 01/15/24 Endotracheal Tube 2 days              Peripheral Intravenous Line  Duration                  Peripheral IV - Single Lumen 01/15/24 1302 18 G Left Antecubital 1 day         Peripheral IV - Single Lumen 01/15/24 1313 18 G Right Antecubital 1 day         Peripheral IV - Single Lumen 01/15/24 1425 20 G Posterior;Right Hand 1 day                    Significant Labs:    CBC/Anemia Profile:  Recent Labs   Lab 01/15/24  1308 01/16/24  0552 01/17/24  0430   WBC 23.89* 15.69* 17.22*  17.22*   HGB 9.8* 8.6* 10.4*  10.4*   HCT 32.0* 27.6* 32.3*  32.3*    305 356  356   MCV 99* 95 94  94   RDW 14.3 14.3 14.6*  14.6*        Chemistries:  Recent Labs   Lab 01/15/24  1308 01/16/24  0552 01/17/24  0430    134* 132*   K 3.6 4.4 4.5    104 102   CO2 14* 19* 17*   BUN 7* 16 23   CREATININE 2.4* 3.4* 4.6*   CALCIUM 8.5* 8.2* 8.5*   ALBUMIN 2.6* 2.3* 2.3*   PROT 7.7 6.6 7.2   BILITOT 0.6 0.4 0.3   ALKPHOS 147* 132 139*   ALT 62* 46* 41   AST 75* 45* 35   MG 2.2 1.6 2.3   PHOS 3.3 1.9* 4.4       All pertinent labs within the past 24 hours have been reviewed.    Significant Imaging:  I have reviewed all pertinent imaging results/findings within the past 24 hours.

## 2024-01-17 NOTE — ASSESSMENT & PLAN NOTE
ESRD on on chronic dialysis (PD x 1 year followed by HD x 1 month) at a HD unit q [facility] in [place]  Last HD was 2 days ago. Per h/o, HD was uneventful  Pt was found with LOC in her car some hours post HD  K normal  No significant acidemia  O2 sat good, intubated    No acute indications for HD today, though today is her regular HD day  Recommend await neurological prognosis before providing routine chronic HD support  Spoke with pt's son, explained, discussed. Family in agreement

## 2024-01-17 NOTE — ASSESSMENT & PLAN NOTE
- MWF via left chest vascath  - no acute indication  - Nephrology consulted; RRT per Nephro  - renally dose meds and avoid nephrotoxins

## 2024-01-18 LAB
ALBUMIN SERPL BCP-MCNC: 2.2 G/DL (ref 3.5–5.2)
ALLENS TEST: ABNORMAL
ALP SERPL-CCNC: 127 U/L (ref 55–135)
ALT SERPL W/O P-5'-P-CCNC: 35 U/L (ref 10–44)
ANION GAP SERPL CALC-SCNC: 13 MMOL/L (ref 8–16)
APTT PPP: 40.7 SEC (ref 21–32)
AST SERPL-CCNC: 51 U/L (ref 10–40)
BACTERIA BLD CULT: ABNORMAL
BACTERIA UR CULT: ABNORMAL
BASOPHILS # BLD AUTO: 0.06 K/UL (ref 0–0.2)
BASOPHILS # BLD AUTO: 0.06 K/UL (ref 0–0.2)
BASOPHILS NFR BLD: 0.4 % (ref 0–1.9)
BASOPHILS NFR BLD: 0.4 % (ref 0–1.9)
BILIRUB SERPL-MCNC: 0.2 MG/DL (ref 0.1–1)
BUN SERPL-MCNC: 34 MG/DL (ref 8–23)
CALCIUM SERPL-MCNC: 7.7 MG/DL (ref 8.7–10.5)
CHLORIDE SERPL-SCNC: 103 MMOL/L (ref 95–110)
CO2 SERPL-SCNC: 17 MMOL/L (ref 23–29)
CREAT SERPL-MCNC: 5.7 MG/DL (ref 0.5–1.4)
DELSYS: ABNORMAL
DIFFERENTIAL METHOD BLD: ABNORMAL
DIFFERENTIAL METHOD BLD: ABNORMAL
EOSINOPHIL # BLD AUTO: 0.5 K/UL (ref 0–0.5)
EOSINOPHIL # BLD AUTO: 0.5 K/UL (ref 0–0.5)
EOSINOPHIL NFR BLD: 3.5 % (ref 0–8)
EOSINOPHIL NFR BLD: 3.5 % (ref 0–8)
ERYTHROCYTE [DISTWIDTH] IN BLOOD BY AUTOMATED COUNT: 14.6 % (ref 11.5–14.5)
ERYTHROCYTE [DISTWIDTH] IN BLOOD BY AUTOMATED COUNT: 14.6 % (ref 11.5–14.5)
EST. GFR  (NO RACE VARIABLE): 7 ML/MIN/1.73 M^2
FIO2: 30
GLUCOSE SERPL-MCNC: 244 MG/DL (ref 70–110)
HCO3 UR-SCNC: 18.3 MMOL/L (ref 24–28)
HCT VFR BLD AUTO: 31.8 % (ref 37–48.5)
HCT VFR BLD AUTO: 31.8 % (ref 37–48.5)
HGB BLD-MCNC: 9.8 G/DL (ref 12–16)
HGB BLD-MCNC: 9.8 G/DL (ref 12–16)
IMM GRANULOCYTES # BLD AUTO: 0.14 K/UL (ref 0–0.04)
IMM GRANULOCYTES # BLD AUTO: 0.14 K/UL (ref 0–0.04)
IMM GRANULOCYTES NFR BLD AUTO: 0.9 % (ref 0–0.5)
IMM GRANULOCYTES NFR BLD AUTO: 0.9 % (ref 0–0.5)
LYMPHOCYTES # BLD AUTO: 1.2 K/UL (ref 1–4.8)
LYMPHOCYTES # BLD AUTO: 1.2 K/UL (ref 1–4.8)
LYMPHOCYTES NFR BLD: 7.8 % (ref 18–48)
LYMPHOCYTES NFR BLD: 7.8 % (ref 18–48)
MAGNESIUM SERPL-MCNC: 2.2 MG/DL (ref 1.6–2.6)
MCH RBC QN AUTO: 29.2 PG (ref 27–31)
MCH RBC QN AUTO: 29.2 PG (ref 27–31)
MCHC RBC AUTO-ENTMCNC: 30.8 G/DL (ref 32–36)
MCHC RBC AUTO-ENTMCNC: 30.8 G/DL (ref 32–36)
MCV RBC AUTO: 95 FL (ref 82–98)
MCV RBC AUTO: 95 FL (ref 82–98)
MODE: ABNORMAL
MONOCYTES # BLD AUTO: 1.2 K/UL (ref 0.3–1)
MONOCYTES # BLD AUTO: 1.2 K/UL (ref 0.3–1)
MONOCYTES NFR BLD: 7.8 % (ref 4–15)
MONOCYTES NFR BLD: 7.8 % (ref 4–15)
NEUTROPHILS # BLD AUTO: 12.1 K/UL (ref 1.8–7.7)
NEUTROPHILS # BLD AUTO: 12.1 K/UL (ref 1.8–7.7)
NEUTROPHILS NFR BLD: 79.6 % (ref 38–73)
NEUTROPHILS NFR BLD: 79.6 % (ref 38–73)
NRBC BLD-RTO: 0 /100 WBC
NRBC BLD-RTO: 0 /100 WBC
PCO2 BLDA: 28.4 MMHG (ref 35–45)
PEEP: 5
PH SMN: 7.42 [PH] (ref 7.35–7.45)
PHOSPHATE SERPL-MCNC: 5.1 MG/DL (ref 2.7–4.5)
PLATELET # BLD AUTO: 337 K/UL (ref 150–450)
PLATELET # BLD AUTO: 337 K/UL (ref 150–450)
PMV BLD AUTO: 9 FL (ref 9.2–12.9)
PMV BLD AUTO: 9 FL (ref 9.2–12.9)
PO2 BLDA: 109 MMHG (ref 80–100)
POC BE: -6 MMOL/L
POC SATURATED O2: 98 % (ref 95–100)
POCT GLUCOSE: 255 MG/DL (ref 70–110)
POCT GLUCOSE: 255 MG/DL (ref 70–110)
POCT GLUCOSE: 269 MG/DL (ref 70–110)
POTASSIUM SERPL-SCNC: 4.7 MMOL/L (ref 3.5–5.1)
PROT SERPL-MCNC: 6.3 G/DL (ref 6–8.4)
PS: 7
RBC # BLD AUTO: 3.36 M/UL (ref 4–5.4)
RBC # BLD AUTO: 3.36 M/UL (ref 4–5.4)
SAMPLE: ABNORMAL
SITE: ABNORMAL
SODIUM SERPL-SCNC: 133 MMOL/L (ref 136–145)
VANCOMYCIN SERPL-MCNC: 19.2 UG/ML
WBC # BLD AUTO: 15.24 K/UL (ref 3.9–12.7)
WBC # BLD AUTO: 15.24 K/UL (ref 3.9–12.7)

## 2024-01-18 PROCEDURE — 93005 ELECTROCARDIOGRAM TRACING: CPT

## 2024-01-18 PROCEDURE — 63600175 PHARM REV CODE 636 W HCPCS: Performed by: INTERNAL MEDICINE

## 2024-01-18 PROCEDURE — 82800 BLOOD PH: CPT

## 2024-01-18 PROCEDURE — 25000003 PHARM REV CODE 250: Performed by: INTERNAL MEDICINE

## 2024-01-18 PROCEDURE — 99233 SBSQ HOSP IP/OBS HIGH 50: CPT | Mod: 25,,, | Performed by: PHYSICIAN ASSISTANT

## 2024-01-18 PROCEDURE — 94761 N-INVAS EAR/PLS OXIMETRY MLT: CPT

## 2024-01-18 PROCEDURE — 87186 SC STD MICRODIL/AGAR DIL: CPT | Performed by: INTERNAL MEDICINE

## 2024-01-18 PROCEDURE — 90937 HEMODIALYSIS REPEATED EVAL: CPT | Mod: ,,, | Performed by: INTERNAL MEDICINE

## 2024-01-18 PROCEDURE — 87075 CULTR BACTERIA EXCEPT BLOOD: CPT | Performed by: INTERNAL MEDICINE

## 2024-01-18 PROCEDURE — 27200966 HC CLOSED SUCTION SYSTEM

## 2024-01-18 PROCEDURE — 27100171 HC OXYGEN HIGH FLOW UP TO 24 HOURS

## 2024-01-18 PROCEDURE — 80100014 HC HEMODIALYSIS 1:1

## 2024-01-18 PROCEDURE — 83735 ASSAY OF MAGNESIUM: CPT | Performed by: INTERNAL MEDICINE

## 2024-01-18 PROCEDURE — 27000207 HC ISOLATION

## 2024-01-18 PROCEDURE — 95822 EEG COMA OR SLEEP ONLY: CPT | Mod: 26,,, | Performed by: STUDENT IN AN ORGANIZED HEALTH CARE EDUCATION/TRAINING PROGRAM

## 2024-01-18 PROCEDURE — 95822 EEG COMA OR SLEEP ONLY: CPT

## 2024-01-18 PROCEDURE — 20000000 HC ICU ROOM

## 2024-01-18 PROCEDURE — 93010 ELECTROCARDIOGRAM REPORT: CPT | Mod: ,,, | Performed by: INTERNAL MEDICINE

## 2024-01-18 PROCEDURE — 84100 ASSAY OF PHOSPHORUS: CPT | Performed by: INTERNAL MEDICINE

## 2024-01-18 PROCEDURE — 25000003 PHARM REV CODE 250: Performed by: PHYSICIAN ASSISTANT

## 2024-01-18 PROCEDURE — 99900035 HC TECH TIME PER 15 MIN (STAT)

## 2024-01-18 PROCEDURE — 85025 COMPLETE CBC W/AUTO DIFF WBC: CPT | Performed by: INTERNAL MEDICINE

## 2024-01-18 PROCEDURE — 80053 COMPREHEN METABOLIC PANEL: CPT | Performed by: INTERNAL MEDICINE

## 2024-01-18 PROCEDURE — 87077 CULTURE AEROBIC IDENTIFY: CPT | Performed by: INTERNAL MEDICINE

## 2024-01-18 PROCEDURE — 87070 CULTURE OTHR SPECIMN AEROBIC: CPT | Performed by: INTERNAL MEDICINE

## 2024-01-18 PROCEDURE — 99233 SBSQ HOSP IP/OBS HIGH 50: CPT | Mod: NSCH,,, | Performed by: INTERNAL MEDICINE

## 2024-01-18 PROCEDURE — 82803 BLOOD GASES ANY COMBINATION: CPT

## 2024-01-18 PROCEDURE — 36415 COLL VENOUS BLD VENIPUNCTURE: CPT | Performed by: INTERNAL MEDICINE

## 2024-01-18 PROCEDURE — 80202 ASSAY OF VANCOMYCIN: CPT | Performed by: INTERNAL MEDICINE

## 2024-01-18 PROCEDURE — 5A1D70Z PERFORMANCE OF URINARY FILTRATION, INTERMITTENT, LESS THAN 6 HOURS PER DAY: ICD-10-PCS | Performed by: INTERNAL MEDICINE

## 2024-01-18 PROCEDURE — 99291 CRITICAL CARE FIRST HOUR: CPT | Mod: 25,,, | Performed by: INTERNAL MEDICINE

## 2024-01-18 PROCEDURE — 63600175 PHARM REV CODE 636 W HCPCS

## 2024-01-18 PROCEDURE — 85730 THROMBOPLASTIN TIME PARTIAL: CPT

## 2024-01-18 PROCEDURE — 99900026 HC AIRWAY MAINTENANCE (STAT)

## 2024-01-18 PROCEDURE — 36600 WITHDRAWAL OF ARTERIAL BLOOD: CPT

## 2024-01-18 RX ORDER — HEPARIN SODIUM 5000 [USP'U]/ML
5000 INJECTION, SOLUTION INTRAVENOUS; SUBCUTANEOUS EVERY 8 HOURS
Status: DISCONTINUED | OUTPATIENT
Start: 2024-01-18 | End: 2024-01-23

## 2024-01-18 RX ORDER — NAPROXEN SODIUM 220 MG/1
81 TABLET, FILM COATED ORAL DAILY
Status: DISCONTINUED | OUTPATIENT
Start: 2024-01-18 | End: 2024-01-23

## 2024-01-18 RX ORDER — POLYETHYLENE GLYCOL 3350 17 G/17G
17 POWDER, FOR SOLUTION ORAL DAILY
Status: DISCONTINUED | OUTPATIENT
Start: 2024-01-18 | End: 2024-01-22

## 2024-01-18 RX ADMIN — HEPARIN SODIUM 5000 UNITS: 5000 INJECTION INTRAVENOUS; SUBCUTANEOUS at 01:01

## 2024-01-18 RX ADMIN — HYDRALAZINE HYDROCHLORIDE 10 MG: 20 INJECTION, SOLUTION INTRAMUSCULAR; INTRAVENOUS at 07:01

## 2024-01-18 RX ADMIN — INSULIN DETEMIR 10 UNITS: 100 INJECTION, SOLUTION SUBCUTANEOUS at 08:01

## 2024-01-18 RX ADMIN — MEROPENEM 500 MG: 500 INJECTION INTRAVENOUS at 07:01

## 2024-01-18 RX ADMIN — INSULIN ASPART 6 UNITS: 100 INJECTION, SOLUTION INTRAVENOUS; SUBCUTANEOUS at 05:01

## 2024-01-18 RX ADMIN — MUPIROCIN: 20 OINTMENT TOPICAL at 09:01

## 2024-01-18 RX ADMIN — HEPARIN SODIUM 5000 UNITS: 5000 INJECTION INTRAVENOUS; SUBCUTANEOUS at 09:01

## 2024-01-18 RX ADMIN — CHLORHEXIDINE GLUCONATE 0.12% ORAL RINSE 15 ML: 1.2 LIQUID ORAL at 08:01

## 2024-01-18 RX ADMIN — POLYETHYLENE GLYCOL 3350 17 G: 17 POWDER, FOR SOLUTION ORAL at 01:01

## 2024-01-18 RX ADMIN — CHLORHEXIDINE GLUCONATE 0.12% ORAL RINSE 15 ML: 1.2 LIQUID ORAL at 09:01

## 2024-01-18 RX ADMIN — ASPIRIN 81 MG CHEWABLE TABLET 81 MG: 81 TABLET CHEWABLE at 01:01

## 2024-01-18 RX ADMIN — AMIODARONE HYDROCHLORIDE 200 MG: 200 TABLET ORAL at 08:01

## 2024-01-18 RX ADMIN — INSULIN ASPART 6 UNITS: 100 INJECTION, SOLUTION INTRAVENOUS; SUBCUTANEOUS at 11:01

## 2024-01-18 RX ADMIN — HEPARIN SODIUM 12 UNITS/KG/HR: 10000 INJECTION, SOLUTION INTRAVENOUS at 06:01

## 2024-01-18 RX ADMIN — FAMOTIDINE 20 MG: 40 POWDER, FOR SUSPENSION ORAL at 08:01

## 2024-01-18 RX ADMIN — MUPIROCIN: 20 OINTMENT TOPICAL at 08:01

## 2024-01-18 RX ADMIN — VANCOMYCIN HYDROCHLORIDE 500 MG: 500 INJECTION, POWDER, LYOPHILIZED, FOR SOLUTION INTRAVENOUS at 06:01

## 2024-01-18 RX ADMIN — PRAVASTATIN SODIUM 40 MG: 20 TABLET ORAL at 08:01

## 2024-01-18 RX ADMIN — INSULIN ASPART 6 UNITS: 100 INJECTION, SOLUTION INTRAVENOUS; SUBCUTANEOUS at 06:01

## 2024-01-18 NOTE — HPI
72 y/o WF with ESRD, DM, HTN, and chronic left foot wound with osteo presents.  She was found after she was found slumped over her steering wheel at a gas station.  She is now intubated .  She had wound debridement -1/11/24 at [facility] -location - Location: left plantar distal ulcer   Debridement type:  excisional   Ct chest /abd/pelvis- 01/15/  1. There are several pockets of gas in the right humeral head.  Osteomyelitis cannot be excluded.  2. Lymphadenopathy  3. Tiny bilateral pleural effusions  4. There are healing and/or healed fractures in the anterior aspect of the thoracic cage bilaterally.  Blood culture- MRSA-01/15    01/15- Influenza A  Urine culture- 01/15- MRSa  12/26/23-  omponent 3 wk ago Comments   Aerobic Culture  Abnormal   Staphylococcus aureus  Moderate growth Methicillin resistant (MRSA)   Aerobic Culture  Abnormal   Proteus mirabilis  Scant growth

## 2024-01-18 NOTE — CONSULTS
[facility])  Infectious Disease  Consult Note    Patient Name: Sydney Lejeune  MRN: [id number]  Admission Date: 1/15/2024  Hospital Length of Stay: 3 days  Attending Physician: David Barrios MD  Primary Care Provider: Maritza, Primary Doctor     Isolation Status: Contact and Droplet    Patient information was obtained from past medical records and ER records.      Consults  Assessment/Plan:     Cardiac/Vascular  Cardiac arrest  Follow cardiology     Renal/  ESRD (end stage renal disease)  HD as per nephrology    ID  MRSA bacteremia  Source control is needed    Echo done- 1/15- no veg  Will follow repeat blood cultures    Chronic osteomyelitis  Will need foot imaging when more stable -  Will add empiric Meropenem-  Wound cultures done 12/23- Proteus was Rocephin resistant -will add meropenme   On Vanco for mRSA   Will do local wound cultures     Endocrine  Type 2 diabetes mellitus with kidney complication, with long-term current use of insulin  Insulin regime as per primary team        Thank you for your consult. I will follow-up with patient. Please contact us if you have any additional questions.    Cuauhtemoc Crowder MD, [facility]  Infectious Disease  [facility] - [facility] ([facility])    Subjective:     Principal Problem: <principal problem not specified>    HPI:  72 y/o WF with ESRD, DM, HTN, and chronic left foot wound with osteo presents.  She was found after she was found slumped over her steering wheel at a gas station.  She is now intubated .  She had wound debridement -1/11/24 at [facility] -location - Location: left plantar distal ulcer   Debridement type:  excisional   Ct chest /abd/pelvis- 01/15/  1. There are several pockets of gas in the right humeral head.  Osteomyelitis cannot be excluded.  2. Lymphadenopathy  3. Tiny bilateral pleural effusions  4. There are healing and/or healed fractures in the anterior aspect of the thoracic cage bilaterally.  Blood culture- MRSA-01/15    01/15-  Influenza A  Urine culture- 01/15- MRSa  12/26/23-  omponent 3 wk ago Comments   Aerobic Culture  Abnormal   Staphylococcus aureus  Moderate growth Methicillin resistant (MRSA)   Aerobic Culture  Abnormal   Proteus mirabilis  Scant growth        Past Medical History:   Diagnosis Date    Diabetes mellitus     Diabetes mellitus with stage 4 chronic kidney disease     Hypertension     Kidney stones     MRSA (methicillin resistant staph aureus) culture positive     Renal disorder        Past Surgical History:   Procedure Laterality Date    FOOT SURGERY      kidney stent placement         Review of patient's allergies indicates:   Allergen Reactions    Tetanus vaccines and toxoid Nausea And Vomiting    Codeine Other (See Comments)     Upset stomach       Medications:  Medications Prior to Admission   Medication Sig    amlodipine (NORVASC) 10 MG tablet Take 10 mg by mouth once daily.    furosemide (LASIX) 40 MG tablet Take 40 mg by mouth 2 (two) times daily.    insulin aspart protamine-insulin aspart (NOVOLOG 70/30) 100 unit/mL (70-30) InPn pen Inject 28 Units into the skin 2 (two) times daily before meals.     losartan (COZAAR) 25 MG tablet Take 25 mg by mouth once daily.    metoprolol tartrate (LOPRESSOR) 100 MG tablet Take 100 mg by mouth 2 (two) times daily.    ondansetron (ZOFRAN) 4 MG tablet Take 1 tablet (4 mg total) by mouth every 8 (eight) hours as needed.    oxycodone-acetaminophen (PERCOCET)  mg per tablet Take 1 tablet by mouth every 4 (four) hours as needed for Pain.    pravastatin (PRAVACHOL) 40 MG tablet Take 40 mg by mouth once daily.    promethazine (PHENERGAN) 25 MG tablet Take 1 tablet (25 mg total) by mouth every 6 (six) hours as needed for Nausea.     Antibiotics (From admission, onward)      Start     Stop Route Frequency Ordered    01/17/24 2000  meropenem (MERREM) 500 mg in sodium chloride 0.9 % 100 mL IVPB (MB+)         -- IV Every 24 hours (non-standard times) 01/17/24 3418    01/16/24 1030   mupirocin 2 % ointment         01/21/24 0859 Nasl 2 times daily 01/16/24 0926    01/15/24 1410  vancomycin - pharmacy to dose  (vancomycin IVPB (PEDS and ADULTS))        See Sharri for full Linked Orders Report.    -- IV pharmacy to manage frequency 01/15/24 1310          Antifungals (From admission, onward)      None          Antivirals (From admission, onward)      None             Immunization History   Administered Date(s) Administered    COVID-19, MRNA, LN-S, PF (MODERNA FULL 0.5 ML DOSE) 03/04/2021, 04/01/2021       Family History       Problem Relation (Age of Onset)    Hypertension Mother, Father          Social History     Socioeconomic History    Marital status:    Tobacco Use    Smoking status: Former   Substance and Sexual Activity    Alcohol use: No    Drug use: No    Sexual activity: Yes     Social Determinants of Health     Financial Resource Strain: Patient Unable To Answer (1/17/2024)    Overall Financial Resource Strain (CARDIA)     Difficulty of Paying Living Expenses: Patient unable to answer   Food Insecurity: Patient Unable To Answer (1/17/2024)    Hunger Vital Sign     Worried About Running Out of Food in the Last Year: Patient unable to answer     Ran Out of Food in the Last Year: Patient unable to answer   Transportation Needs: Patient Unable To Answer (1/17/2024)    PRAPARE - Transportation     Lack of Transportation (Medical): Patient unable to answer     Lack of Transportation (Non-Medical): Patient unable to answer   Stress: Patient Unable To Answer (1/17/2024)    [employer] - Occupational Stress Questionnaire     Feeling of Stress : Patient unable to answer   Housing Stability: Patient Unable To Answer (1/17/2024)    Housing Stability Vital Sign     Unable to Pay for Housing in the Last Year: Patient unable to answer     Unstable Housing in the Last Year: Patient unable to answer     Review of Systems   Unable to perform ROS: Intubated      Objective:     Vital Signs (Most Recent):  Temp: 98.2 °F (36.8 °C) (01/18/24 0500)  Pulse: 75 (01/18/24 0720)  Resp: 20 (01/18/24 0720)  BP: 136/65 (01/18/24 0720)  SpO2: 100 % (01/18/24 0720) Vital Signs (24h Range):  Temp:  [97.4 °F (36.3 °C)-99 °F (37.2 °C)] 98.2 °F (36.8 °C)  Pulse:  [67-83] 75  Resp:  [18-24] 20  SpO2:  [100 %] 100 %  BP: (136-172)/(55-77) 136/65     Weight: 108.9 kg (240 lb 1.3 oz)  Body mass index is 37.6 kg/m².    Estimated Creatinine Clearance: 11.5 mL/min (A) (based on SCr of 5.7 mg/dL (H)).     Physical Exam  Vitals and nursing note reviewed.   Constitutional:       Comments: intubated   Eyes:      General:         Right eye: No discharge.         Left eye: No discharge.   Pulmonary:      Effort: Pulmonary effort is normal.      Comments: intubated  Abdominal:      General: There is no distension.      Palpations: There is no mass.   Musculoskeletal:         General: No swelling.      Comments: See wound care pics   Neurological:      Comments: Intubated /sedated          Significant Labs: Blood Culture:   Recent Labs   Lab 01/15/24  1308 01/15/24  1309 01/17/24  0909   LABBLOO Gram stain aer bottle: Gram positive cocci in clusters resembling Staph  Results called to and read back by: Gladis Ricci RN 01/16/2024  08:16  Gram stain olegario bottle: Gram positive cocci in clusters resembling Staph  Positive results previously called 01/16/2024  10:29  STAPHYLOCOCCUS AUREUS  ID consult required at [facility] Mayte Rosas and Jarrett bruno.  For susceptibility see order #[id number]  * Gram stain aer bottle: Gram positive cocci in clusters resembling Staph  Results called to and read back by: Gladis Ricci RN 01/16/2024  08:16  Gram stain olegario bottle: Gram positive cocci in clusters resembling Staph  Positive results previously called 01/16/2024  10:29  STAPHYLOCOCCUS AUREUS  Susceptibility pending  ID consult required at [facility].Mayte Gold and Jarrett bruno.  * No Growth to date  No  "Growth to date     CBC:   Recent Labs   Lab 01/17/24  0430 01/18/24  0419   WBC 17.22*  17.22* 15.24*  15.24*   HGB 10.4*  10.4* 9.8*  9.8*   HCT 32.3*  32.3* 31.8*  31.8*     356 337  337     CMP:   Recent Labs   Lab 01/17/24  0430 01/18/24  0419   * 133*   K 4.5 4.7    103   CO2 17* 17*   * 244*   BUN 23 34*   CREATININE 4.6* 5.7*   CALCIUM 8.5* 7.7*   PROT 7.2 6.3   ALBUMIN 2.3* 2.2*   BILITOT 0.3 0.2   ALKPHOS 139* 127   AST 35 51*   ALT 41 35   ANIONGAP 13 13     Wound Culture: No results for input(s): "LABAERO" in the last 4320 hours.  All pertinent labs within the past 24 hours have been reviewed.    Significant Imaging: I have reviewed all pertinent imaging results/findings within the past 24 hours.              "

## 2024-01-18 NOTE — ASSESSMENT & PLAN NOTE
- initially thought Cardiac most likely given runs of NSVT and good oxgenation, but now blood cultures are showing Staph (though no evidence of true septic shock given stable hemodynamics)  - empiric Abx  - Cardiology following  - amio gtt per Cardiology  - replete electrolytes cautiously given ESRD  - Echo with mildly decreased LVEF  - Cardiac monitoring  - avoid fever  - initial CT Head unremarkable  - EEG today
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
## 2024-01-18 NOTE — NURSING
PT. Intubated, not sedated at this time. No purposeful movement to repeated stimuli. Corneal reflexes present along with cough. Heparin gtt infusing. TF infusing at 40 goal rate. UOP, voiding per smiley,  total 150 ml for shift. Clear yellow.

## 2024-01-18 NOTE — SUBJECTIVE & OBJECTIVE
Interval History: Pt was seen and examined in ICU. Labs and meds reviewed. Discussed with other providers. No new events. Remains critically ill and intubated. No voluntary movement, no response.      Review of patient's allergies indicates:   Allergen Reactions    Tetanus vaccines and toxoid Nausea And Vomiting    Codeine Other (See Comments)     Upset stomach     Current Facility-Administered Medications   Medication Frequency    amiodarone tablet 200 mg Daily    aspirin chewable tablet 81 mg Daily    chlorhexidine 0.12 % solution 15 mL BID    dextrose 10% bolus 125 mL 125 mL PRN    dextrose 10% bolus 250 mL 250 mL PRN    famotidine 40 mg/5 mL (8 mg/mL) suspension 20 mg Daily    glucagon (human recombinant) injection 1 mg PRN    heparin (porcine) injection 5,000 Units Q8H    hydrALAZINE injection 10 mg Q6H PRN    insulin aspart U-100 pen 0-10 Units Q6H PRN    insulin detemir U-100 (Levemir) pen 15 Units BID    meropenem (MERREM) 500 mg in sodium chloride 0.9 % 100 mL IVPB (MB+) Q24H    mupirocin 2 % ointment BID    polyethylene glycol packet 17 g Daily    pravastatin tablet 40 mg Daily    sodium chloride 0.9% flush 10 mL PRN    vancomycin (VANCOCIN) 500 mg in dextrose 5 % in water (D5W) 100 mL IVPB (MB+) Once    vancomycin - pharmacy to dose pharmacy to manage frequency       Objective:     Vital Signs (Most Recent):  Temp: 99 °F (37.2 °C) (01/18/24 1515)  Pulse: 80 (01/18/24 1515)  Resp: (!) 21 (01/18/24 1515)  BP: (!) 162/67 (01/18/24 1515)  SpO2: 100 % (01/18/24 1515) Vital Signs (24h Range):  Temp:  [97.4 °F (36.3 °C)-99.2 °F (37.3 °C)] 99 °F (37.2 °C)  Pulse:  [69-84] 80  Resp:  [18-27] 21  SpO2:  [98 %-100 %] 100 %  BP: (124-183)/(55-84) 162/67     Weight: 108.9 kg (240 lb 1.3 oz) (01/17/24 1018)  Body mass index is 37.6 kg/m².  Body surface area is 2.27 meters squared.    I/O last 3 completed shifts:  In: 1288.6 [P.O.:100; I.V.:579.9; NG/GT:410; IV Piggyback:198.7]  Out: 365 [Urine:265; Drains:100]      Physical Exam  Vitals and nursing note reviewed.   Cardiovascular:      Rate and Rhythm: Normal rate and regular rhythm.      Pulses: Normal pulses.      Heart sounds: Normal heart sounds.   Pulmonary:      Effort: Pulmonary effort is normal.      Breath sounds: Rales present.      Comments: Intubated  Abdominal:      Palpations: Abdomen is soft.   Musculoskeletal:      Right lower leg: No edema.      Left lower leg: No edema.          Significant Labs: reviewed  BMP  Lab Results   Component Value Date     (L) 01/18/2024    K 4.7 01/18/2024     01/18/2024    CO2 17 (L) 01/18/2024    BUN 34 (H) 01/18/2024    CREATININE 5.7 (H) 01/18/2024    CALCIUM 7.7 (L) 01/18/2024    ANIONGAP 13 01/18/2024    EGFRNORACEVR 7 (A) 01/18/2024     Lab Results   Component Value Date    WBC 15.24 (H) 01/18/2024    WBC 15.24 (H) 01/18/2024    HGB 9.8 (L) 01/18/2024    HGB 9.8 (L) 01/18/2024    HCT 31.8 (L) 01/18/2024    HCT 31.8 (L) 01/18/2024    MCV 95 01/18/2024    MCV 95 01/18/2024     01/18/2024     01/18/2024       Recent Labs   Lab 01/16/24  2327   TROPONINI 1.537*     ABG  Recent Labs   Lab 01/18/24  0326   PH 7.418   PO2 109*   PCO2 28.4*   HCO3 18.3*   BE -6*       Significant Imaging: reviewed CXR, pulm edema

## 2024-01-18 NOTE — ASSESSMENT & PLAN NOTE
Hep gtt ordered  Ischemia eval once awake and pending neuro status  Cont ICU supportive care  EKG with LBBB  No further VT reported  Cont Amio gtt, statin    1/17/24  -Continue OMT-amio gtt, statin  -ASA 81 mg if able to tolerate  -Remains critically ill  -Ischemic evaluation pending stability/recovery    1/28/24  -No change in CV status  -Continue ASA  -Statin once LFT's normalize  -Ischemic evaluation pending stability/recovery although prognosis seems to be poor

## 2024-01-18 NOTE — ASSESSMENT & PLAN NOTE
- WOCN consulted  - best I can tell, not on any chronic Abx for this.  It seems she gets spot treated here and there and follows with outpt Wound Care regularly  - blood Cx now with MRSA  - getting broad spectrum Abx  - repeat Cx pending

## 2024-01-18 NOTE — PROGRESS NOTES
Pharmacokinetic Assessment Follow Up: IV Vancomycin    Vancomycin serum concentration assessment(s):  Pre-dialysis random level @ 0419 = 19.2 mcg/ml (therapeutic)     Vancomycin Regimen Plan:  Will give 500 mg dose post-HD today  Next pre-HD random level tentatively scheduled for Sat 1/20 @ 0430 with AM labs. Will continue to follow nephrology notes/plan. Normally dialyzes on MWF outpatient.     Drug levels (last 3 results):  Recent Labs   Lab Result Units 01/17/24  0430 01/18/24  0419   Vancomycin, Random ug/mL 18.3 19.2       Pharmacy will continue to follow and monitor vancomycin.    Please contact pharmacy at extension [phone] for questions regarding this assessment.    Thank you for the consult,   Katherine McArdle, Pharm.D. 1/18/2024 1:32 PM        Patient brief summary:  Sydney Lejeune is a 73 y.o. female initiated on antimicrobial therapy with IV Vancomycin for treatment of  MRSA bacteremia, UTI, & chronic osteomyelitis     The patient's current regimen is pulse dosing PRN based on pre-dialysis random levels     Drug Allergies:   Review of patient's allergies indicates:   Allergen Reactions    Tetanus vaccines and toxoid Nausea And Vomiting    Codeine Other (See Comments)     Upset stomach       Actual Body Weight:   108.9 kg    Renal Function:   Estimated Creatinine Clearance: 11.5 mL/min (A) (based on SCr of 5.7 mg/dL (H)).    Dialysis Method (if applicable):  intermittent HD    CBC (last 72 hours):  Recent Labs   Lab Result Units 01/16/24  0552 01/17/24  0430 01/18/24  0419   WBC K/uL 15.69* 17.22*  17.22* 15.24*  15.24*   Hemoglobin g/dL 8.6* 10.4*  10.4* 9.8*  9.8*   Hematocrit % 27.6* 32.3*  32.3* 31.8*  31.8*   Platelets K/uL 305 356  356 337  337   Gran % % 83.5* 80.7*  80.7* 79.6*  79.6*   Lymph % % 8.7* 8.9*  8.9* 7.8*  7.8*   Mono % % 6.5 7.1  7.1 7.8  7.8   Eosinophil % % 0.2 1.6  1.6 3.5  3.5   Basophil % % 0.3 0.5  0.5 0.4  0.4   Differential Method  Automated Automated   Automated Automated  Automated       Metabolic Panel (last 72 hours):  Recent Labs   Lab Result Units 01/16/24  0552 01/17/24  0430 01/18/24  0419   Sodium mmol/L 134* 132* 133*   Potassium mmol/L 4.4 4.5 4.7   Chloride mmol/L 104 102 103   CO2 mmol/L 19* 17* 17*   Glucose mg/dL 245* 243* 244*   BUN mg/dL 16 23 34*   Creatinine mg/dL 3.4* 4.6* 5.7*   Albumin g/dL 2.3* 2.3* 2.2*   Total Bilirubin mg/dL 0.4 0.3 0.2   Alkaline Phosphatase U/L 132 139* 127   AST U/L 45* 35 51*   ALT U/L 46* 41 35   Magnesium mg/dL 1.6 2.3 2.2   Phosphorus mg/dL 1.9* 4.4 5.1*       Vancomycin Administrations:  vancomycin given in the last 96 hours                     vancomycin 2 g in dextrose 5 % 500 mL IVPB (mg) 2,000 mg New Bag 01/15/24 1805                    Microbiologic Results:  Microbiology Results (last 7 days)       Procedure Component Value Units Date/Time    Aerobic culture [[id number]]     Order Status: No result Specimen: Wound from Foot, Left     Culture, Anaerobe [[id number]]     Order Status: No result Specimen: Wound from Foot, Left     Urine culture [[id number]]  (Abnormal)  (Susceptibility) Collected: 01/15/24 1311    Order Status: Completed Specimen: Urine Updated: 01/18/24 1224     Urine Culture, Routine METHICILLIN RESISTANT STAPHYLOCOCCUS AUREUS  > 100,000 cfu/ml      Narrative:      Specimen Source->Urine    Blood culture x two cultures. Draw prior to antibiotics. [[id number]]  (Abnormal) Collected: 01/15/24 1308    Order Status: Completed Specimen: Blood from Peripheral, Antecubital, Left Updated: 01/18/24 1014     Blood Culture, Routine Gram stain aer bottle: Gram positive cocci in clusters resembling Staph      Results called to and read back by: Gladis Ricci RN 01/16/2024  08:16      Gram stain olegario bottle: Gram positive cocci in clusters resembling Staph      Positive results previously called 01/16/2024  10:29      METHICILLIN RESISTANT STAPHYLOCOCCUS AUREUS  ID consult required at [facility] [address] and  [facility].  For susceptibility see order #[id number]      Narrative:      Aerobic and anaerobic    Blood culture x two cultures. Draw prior to antibiotics. [[id number]]  (Abnormal)  (Susceptibility) Collected: 01/15/24 1309    Order Status: Completed Specimen: Blood from Peripheral, Antecubital, Right Updated: 01/18/24 1012     Blood Culture, Routine Gram stain aer bottle: Gram positive cocci in clusters resembling Staph      Results called to and read back by: Gladis Ricci RN 01/16/2024  08:16      Gram stain olegario bottle: Gram positive cocci in clusters resembling Staph      Positive results previously called 01/16/2024  10:29      METHICILLIN RESISTANT STAPHYLOCOCCUS AUREUS  ID consult required at [facility] and [facility].      Narrative:      Aerobic and anaerobic    Blood culture [[id number]] Collected: 01/17/24 0909    Order Status: Completed Specimen: Blood from Antecubital, Right Hand Updated: 01/18/24 0115     Blood Culture, Routine No Growth to date    Blood culture [[id number]] Collected: 01/17/24 0909    Order Status: Completed Specimen: Blood Updated: 01/18/24 0115     Blood Culture, Routine No Growth to date    Rapid Organism ID by PCR (from Blood culture) [[id number]]  (Abnormal) Collected: 01/15/24 1309    Order Status: Completed Updated: 01/16/24 0931     Enterococcus faecalis Not Detected     Enterococcus faecium Not Detected     Listeria monocytogenes Not Detected     Staphylococcus spp. See species for ID     Staphylococcus aureus Detected     Staphylococcus epidermidis Not Detected     Staphylococcus lugdunensis Not Detected     Streptococcus species Not Detected     Streptococcus agalactiae Not Detected     Streptococcus pneumoniae Not Detected     Streptococcus pyogenes Not Detected     Acinetobacter calcoaceticus/baumannii complex Not Detected     Bacteroides fragilis Not Detected     Enterobacterales Not Detected     Enterobacter cloacae complex Not Detected      Escherichia coli Not Detected     Klebsiella aerogenes Not Detected     Klebsiella oxytoca Not Detected     Klebsiella pneumoniae group Not Detected     Proteus Not Detected     Salmonella sp Not Detected     Serratia marcescens Not Detected     Haemophilus influenzae Not Detected     Neisseria meningtidis Not Detected     Pseudomonas aeruginosa Not Detected     Stenotrophomonas maltophilia Not Detected     Candida albicans Not Detected     Candida auris Not Detected     Candida glabrata Not Detected     Candida krusei Not Detected     Candida parapsilosis Not Detected     Candida tropicalis Not Detected     Cryptococcus neoformans/gattii Not Detected     CTX-M (ESBL ) Test Not Applicable     IMP (Carbapenem resistant) Test Not Applicable     KPC resistance gene (Carbapenem resistant) Test Not Applicable     mcr-1  Test Not Applicable     mec A/C  Test Not Applicable     mec A/C and MREJ (MRSA) gene Detected     NDM (Carbapenem resistant) Test Not Applicable     OXA-48-like (Carbapenem resistant) Test Not Applicable     van A/B (VRE gene) Test Not Applicable     VIM (Carbapenem resistant) Test Not Applicable    Narrative:      Aerobic and anaerobic    Influenza A & B by Molecular [[id number]]  (Abnormal) Collected: 01/15/24 1311    Order Status: Completed Specimen: Nasopharyngeal Swab Updated: 01/15/24 1341     Influenza A, Molecular Negative     Influenza B, Molecular Positive     Flu A & B Source Nasal swab

## 2024-01-18 NOTE — ASSESSMENT & PLAN NOTE
Will need foot imaging when more stable -  Will add empiric Meropenem-  Wound cultures done 12/23- Proteus was Rocephin resistant -will add meropenme   On Vanco for mRSA   Will do local wound cultures

## 2024-01-18 NOTE — PROCEDURES
ICU EEG/VIDEO MONITORING REPORT    DATE OF SERVICE: 1/18/24  EEG NUMBER: [id number]  LOCATION OF SERVICE: ICU     METHODOLOGY   Electroencephalographic (EEG) recording is with electrodes placed according to the International 10-20 placement system.  Thirty two (32) channels of digital signal are simultaneously recorded from the scalp and may include EKG, EMG, and/or eye monitors.   Recording band pass was 0.1 to 512 hz.  Digital video recording of the patient is simultaneously recorded with the EEG.  The nursing staff report clinical symptoms and may press an event button when the patient has symptoms of clinical interest to the treating physicians.  EEG and video recording is stored and archived in digital format.  The entire recording is visually reviewed and the times identified by computer analysis as being spikes or seizures are reviewed again.  Activation procedures which include photic stimulation, hyperventilation and instructing patients to perform simple task are done in selected patients.   Compresses spectral analysis (CSA) is also performed on the activity recorded from each individual channel.  This is displayed as a power display of frequencies from 0 to 30 Hz over time.   The CSA analysis is done and displayed continuously.  This is reviewed for asymmetries in power between homologous areas of the scalp and for presence of changes in power which canbe seen when seizures occur.  Sections of suspected abnormalities on the CSA is then compared with the original EEG recording.     Eversight software was also utilized in the review of this study.  This software suite analyzes the EEG recording in multiple domains.  Coherence and rhythmicity is computed to identify EEG sections which may contain organized seizures.  Each channel undergoes analysis to detect presence of spike and sharp waves which have special and morphological characteristic of epileptic activity.  The routine EEG recording is converted from  spacial into frequency domain.  This is then displayed comparing homologous areas to identify areas of significant asymmetry.  Algorithm to identify non-cortically generated artifact is used to separate eye movement, EMG and other artifact from the EEG.      This EEG study is performed in the ICU with the patient on the following sedative medications: None    Indication: 74 yo female who suffered a cardiac arrest; EEG to further evaluate.     State of Consciousness:   Coma    Background:   The background is attenuated however at a high sensitivity there is intermittent theta/delta range slowing observed.  The background displays poor reactivity.      Sleep:   The patient is in a comatose state throughout the record, with no normal sleep architecture appreciated    Epileptiform Abnormalities  None    EKG:   Irregular    Events:   None    Impression:   Severely abnormal EEG demonstrating generalized attenuation consistent with a severe diffuse encephalopathy.  Given the history of cardiac arrest and lack of sedative medications, this is concerning for a hypoxic injury.  However, this study does not demonstrate electro cerebral inactivity.  Clinical correlation with neurological examination and intracranial imaging is recommended.      Minnie Blue MD  [facility]   Department of Neurology/Epilepsy

## 2024-01-18 NOTE — SUBJECTIVE & OBJECTIVE
Review of Systems   Unable to perform ROS: Acuity of condition     Objective:     Vital Signs (Most Recent):  Temp: 99.2 °F (37.3 °C) (01/18/24 1101)  Pulse: 82 (01/18/24 1115)  Resp: (!) 24 (01/18/24 1115)  BP: (!) 157/70 (01/18/24 1115)  SpO2: 98 % (01/18/24 1115) Vital Signs (24h Range):  Temp:  [97.4 °F (36.3 °C)-99.2 °F (37.3 °C)] 99.2 °F (37.3 °C)  Pulse:  [67-83] 82  Resp:  [18-24] 24  SpO2:  [98 %-100 %] 98 %  BP: (124-172)/(55-77) 157/70     Weight: 108.9 kg (240 lb 1.3 oz)  Body mass index is 37.6 kg/m².     SpO2: 98 %         Intake/Output Summary (Last 24 hours) at 1/18/2024 1145  Last data filed at 1/18/2024 1100  Gross per 24 hour   Intake 1091.99 ml   Output 270 ml   Net 821.99 ml       Lines/Drains/Airways       Central Venous Catheter Line  Duration                  Hemodialysis Catheter right subclavian -- days              Drain  Duration                  NG/OG Tube 01/15/24 1333 orogastric Center mouth 2 days         Urethral Catheter 01/15/24 1332 Latex;Double-lumen 16 Fr. 2 days              Airway  Duration                  Airway - Non-Surgical 01/15/24 Endotracheal Tube 3 days              Peripheral Intravenous Line  Duration                  Peripheral IV - Single Lumen 01/15/24 1302 18 G Left Antecubital 2 days         Peripheral IV - Single Lumen 01/15/24 1313 18 G Right Antecubital 2 days         Peripheral IV - Single Lumen 01/15/24 1425 20 G Posterior;Right Hand 2 days                       Physical Exam  Vitals and nursing note reviewed.   Skin:     Findings: Rash: reviewed.            Significant Labs: CMP   Recent Labs   Lab 01/17/24  0430 01/18/24  0419   * 133*   K 4.5 4.7    103   CO2 17* 17*   * 244*   BUN 23 34*   CREATININE 4.6* 5.7*   CALCIUM 8.5* 7.7*   PROT 7.2 6.3   ALBUMIN 2.3* 2.2*   BILITOT 0.3 0.2   ALKPHOS 139* 127   AST 35 51*   ALT 41 35   ANIONGAP 13 13   , CBC   Recent Labs   Lab 01/17/24  0430 01/18/24  0419   WBC 17.22*  17.22* 15.24*   15.24*   HGB 10.4*  10.4* 9.8*  9.8*   HCT 32.3*  32.3* 31.8*  31.8*     356 337  337   , Troponin   Recent Labs   Lab 01/16/24  1710 01/16/24  2327   TROPONINI 2.018* 1.537*   , and All pertinent lab results from the last 24 hours have been reviewed.    Significant Imaging: Echocardiogram: Transthoracic echo (TTE) complete (C[facility] Only):   Results for orders placed or performed during the hospital encounter of 01/15/24   Echo   Result Value Ref Range    BSA 2.32 m2    LVOT stroke volume 88.23 cm3    LVIDd 4.33 3.5 - 6.0 cm    LV Systolic Volume 52.79 mL    LV Systolic Volume Index 23.8 mL/m2    LVIDs 3.55 2.1 - 4.0 cm    LV Diastolic Volume 84.55 mL    LV Diastolic Volume Index 38.09 mL/m2    IVS 1.46 (A) 0.6 - 1.1 cm    LVOT diameter 2.00 cm    LVOT area 3.1 cm2    FS 18 (A) 28 - 44 %    Left Ventricle Relative Wall Thickness 0.85 cm    Posterior Wall 1.83 (A) 0.6 - 1.1 cm    LV mass 301.17 g    LV Mass Index 136 g/m2    MV Peak E Cody 0.95 m/s    TDI LATERAL 0.04 m/s    MV Peak A Cody 1.22 m/s    TR Max Cody 1.29 m/s    E/A ratio 0.78     IVRT 102.76 msec    E wave deceleration time 249.44 msec    LV LATERAL E/E' RATIO 23.75 m/s    LVOT peak cody 1.26 m/s    Left Ventricular Outflow Tract Mean Velocity 0.88 cm/s    Left Ventricular Outflow Tract Mean Gradient 3.42 mmHg    RVDD 3.54 cm    RV S' 12.74 cm/s    RVOT peak VTI 16.9 cm    TAPSE 1.92 cm    LA size 3.69 cm    Left Atrium Minor Axis 6.80 cm    Left Atrium Major Axis 6.39 cm    RA Major Axis 6.30 cm    AV regurgitation pressure 1/2 time 497.602492719986302 ms    AR Max Cody 4.43 m/s    AV mean gradient 11 mmHg    AV peak gradient 20 mmHg    Ao peak cdoy 2.22 m/s    Ao VTI 54.00 cm    LVOT peak VTI 28.10 cm    AV valve area 1.63 cm²    AV Velocity Ratio 0.57     AV index (prosthetic) 0.52     DB by Velocity Ratio 1.78 cm²    MV stenosis pressure 1/2 time 72.34 ms    MV valve area p 1/2 method 3.04 cm2    Triscuspid Valve Regurgitation Peak Gradient 7  mmHg    PV mean gradient 1 mmHg    PV PEAK VELOCITY 1.14 m/s    PV peak gradient 5 mmHg    Pulmonary Valve Mean Velocity 0.67 m/s    RVOT peak philippe 0.82 m/s    Ao root annulus 2.91 cm    STJ 2.51 cm    Ascending aorta 2.68 cm    IVC diameter 2.66 cm    ZLVIDS -2.23     ZLVIDD -5.83     LA Volume Index 36.3 mL/m2    LA volume 80.59 cm3    LA WIDTH 3.9 cm    RA Width 2.9 cm    TV resting pulmonary artery pressure 22 mmHg    RV TB RVSP 16 mmHg    Est. RA pres 15 mmHg    Narrative      Left Ventricle: The left ventricle is normal in size. Normal wall   thickness. There is concentric hypertrophy. Mild global hypokinesis   present. There is mildly reduced systolic function with a visually   estimated ejection fraction of 45 - 50%. There is diastolic dysfunction   but grade cannot be determined. Elevated left ventricular filling   pressure.    Right Ventricle: Normal right ventricular cavity size. Wall thickness   is normal. Right ventricle wall motion  is normal. Systolic function is   borderline low.    Left Atrium: Left atrium is mildly dilated.    Aortic Valve: Mildly calcified cusps. There is mild annular dilation   present. There is mild stenosis. Aortic valve area by VTI is 1.63 cm².   Aortic valve peak velocity is 2.22 m/s. Mean gradient is 11 mmHg. The   dimensionless index is 0.52. There is moderate aortic regurgitation.    Mitral Valve: There is moderate mitral annular calcification present.   There is mild regurgitation.    Tricuspid Valve: There is mild regurgitation.    Pulmonary Artery: The estimated pulmonary artery systolic pressure is   22 mmHg.    IVC/SVC: Elevated venous pressure at 15 mmHg.    Pericardium: There is a small circumferential effusion. No indication   of cardiac tamponade.    Supine/ventilated, STAT ER echo     , EKG: Reviewed, and X-Ray: CXR: X-Ray Chest 1 View (CXR): No results found for this visit on 01/15/24. and X-Ray Chest PA and Lateral (CXR): No results found for this visit on  01/15/24.

## 2024-01-18 NOTE — PROGRESS NOTES
[facility] ([facility])  Cardiology  Progress Note    Patient Name: Sydney Lejeune  MRN: [id number]  Admission Date: 1/15/2024  Hospital Length of Stay: 3 days  Code Status: Full Code   Attending Physician: David Barrios MD   Primary Care Physician: No, Primary Doctor  Expected Discharge Date:   Principal Problem:<principal problem not specified>    Subjective:   HPI:  Ms Lejeune is a 74 y/o WF with ESRD, DM, HTN, and chronic left foot wound with osteo presents via EMS to the ER today after being found slumped over her steering wheel at a gas station.  History is taken from the medical record as the patient is unable to provide history and no family available at the time of my exam.  On EMS arrival, she was pulseless and apneic.  ROSC achieved following 6 rounds of CPR (Epi x 1 and 300mg of Amio).  She was intubated in the ER upon arrival.  Hemodynamics stable and low/mod vent requirements but ongoign short runs of NSVT. Currently on amio gtt and propofol.  Initial CT Head unremarkable.  Labs notable for Lactic of 8, WBC 23.89, Hgb 9.8, K 3.6, BNP 1466, Trop 0.043, positive Flu B.  ABG shows metabolic acidosis with good respiratory compensation and pO2 505.  She is admitted to the ICU for post-arrest care.     Cardiology consulted to assist with management. Chart reviewed, pt on isolation for Flu, pt intubated/sedated. No more VT noted per chart review. Discussed care with ICU MD. Labs reviewed, troponin trending down 4.0-3.5, H/H 8 and 27, Crt 3.4, Blood cultures positive for staph. Ech EF 45-50% mild AS. Pt was schedule for shunt sx 1/18 per chart review          Hospital Course:   1/17/24-Chart reviewed. Patient remains intubated/critically ill. No purposeful movements/responses per nursing note. No arrhythmias documented. Labs reviewed. Creatinine 4.6. H/H stable. BC + for MRSA, on abx.     1/18/24-Chart reviewed. No change in CV status. No arrhythmias, on po amiodarone. ASA initiated. No  purposeful responses to stimuli per documentation. Labs reviewed.       Review of Systems   Unable to perform ROS: Acuity of condition     Objective:     Vital Signs (Most Recent):  Temp: 99.2 °F (37.3 °C) (01/18/24 1101)  Pulse: 82 (01/18/24 1115)  Resp: (!) 24 (01/18/24 1115)  BP: (!) 157/70 (01/18/24 1115)  SpO2: 98 % (01/18/24 1115) Vital Signs (24h Range):  Temp:  [97.4 °F (36.3 °C)-99.2 °F (37.3 °C)] 99.2 °F (37.3 °C)  Pulse:  [67-83] 82  Resp:  [18-24] 24  SpO2:  [98 %-100 %] 98 %  BP: (124-172)/(55-77) 157/70     Weight: 108.9 kg (240 lb 1.3 oz)  Body mass index is 37.6 kg/m².     SpO2: 98 %         Intake/Output Summary (Last 24 hours) at 1/18/2024 1145  Last data filed at 1/18/2024 1100  Gross per 24 hour   Intake 1091.99 ml   Output 270 ml   Net 821.99 ml       Lines/Drains/Airways       Central Venous Catheter Line  Duration                  Hemodialysis Catheter right subclavian -- days              Drain  Duration                  NG/OG Tube 01/15/24 1333 orogastric Center mouth 2 days         Urethral Catheter 01/15/24 1332 Latex;Double-lumen 16 Fr. 2 days              Airway  Duration                  Airway - Non-Surgical 01/15/24 Endotracheal Tube 3 days              Peripheral Intravenous Line  Duration                  Peripheral IV - Single Lumen 01/15/24 1302 18 G Left Antecubital 2 days         Peripheral IV - Single Lumen 01/15/24 1313 18 G Right Antecubital 2 days         Peripheral IV - Single Lumen 01/15/24 1425 20 G Posterior;Right Hand 2 days                       Physical Exam  Vitals and nursing note reviewed.   Skin:     Findings: Rash: reviewed.            Significant Labs: CMP   Recent Labs   Lab 01/17/24  0430 01/18/24  0419   * 133*   K 4.5 4.7    103   CO2 17* 17*   * 244*   BUN 23 34*   CREATININE 4.6* 5.7*   CALCIUM 8.5* 7.7*   PROT 7.2 6.3   ALBUMIN 2.3* 2.2*   BILITOT 0.3 0.2   ALKPHOS 139* 127   AST 35 51*   ALT 41 35   ANIONGAP 13 13   , CBC   Recent Labs    Lab 01/17/24  0430 01/18/24  0419   WBC 17.22*  17.22* 15.24*  15.24*   HGB 10.4*  10.4* 9.8*  9.8*   HCT 32.3*  32.3* 31.8*  31.8*     356 337  337   , Troponin   Recent Labs   Lab 01/16/24  1710 01/16/24  2327   TROPONINI 2.018* 1.537*   , and All pertinent lab results from the last 24 hours have been reviewed.    Significant Imaging: Echocardiogram: Transthoracic echo (TTE) complete ([facility] Only):   Results for orders placed or performed during the hospital encounter of 01/15/24   Echo   Result Value Ref Range    BSA 2.32 m2    LVOT stroke volume 88.23 cm3    LVIDd 4.33 3.5 - 6.0 cm    LV Systolic Volume 52.79 mL    LV Systolic Volume Index 23.8 mL/m2    LVIDs 3.55 2.1 - 4.0 cm    LV Diastolic Volume 84.55 mL    LV Diastolic Volume Index 38.09 mL/m2    IVS 1.46 (A) 0.6 - 1.1 cm    LVOT diameter 2.00 cm    LVOT area 3.1 cm2    FS 18 (A) 28 - 44 %    Left Ventricle Relative Wall Thickness 0.85 cm    Posterior Wall 1.83 (A) 0.6 - 1.1 cm    LV mass 301.17 g    LV Mass Index 136 g/m2    MV Peak E Cody 0.95 m/s    TDI LATERAL 0.04 m/s    MV Peak A Cody 1.22 m/s    TR Max Cody 1.29 m/s    E/A ratio 0.78     IVRT 102.76 msec    E wave deceleration time 249.44 msec    LV LATERAL E/E' RATIO 23.75 m/s    LVOT peak cody 1.26 m/s    Left Ventricular Outflow Tract Mean Velocity 0.88 cm/s    Left Ventricular Outflow Tract Mean Gradient 3.42 mmHg    RVDD 3.54 cm    RV S' 12.74 cm/s    RVOT peak VTI 16.9 cm    TAPSE 1.92 cm    LA size 3.69 cm    Left Atrium Minor Axis 6.80 cm    Left Atrium Major Axis 6.39 cm    RA Major Axis 6.30 cm    AV regurgitation pressure 1/2 time 497.409720698364337 ms    AR Max Cody 4.43 m/s    AV mean gradient 11 mmHg    AV peak gradient 20 mmHg    Ao peak cody 2.22 m/s    Ao VTI 54.00 cm    LVOT peak VTI 28.10 cm    AV valve area 1.63 cm²    AV Velocity Ratio 0.57     AV index (prosthetic) 0.52     DB by Velocity Ratio 1.78 cm²    MV stenosis pressure 1/2 time 72.34 ms    MV valve area p 1/2  method 3.04 cm2    Triscuspid Valve Regurgitation Peak Gradient 7 mmHg    PV mean gradient 1 mmHg    PV PEAK VELOCITY 1.14 m/s    PV peak gradient 5 mmHg    Pulmonary Valve Mean Velocity 0.67 m/s    RVOT peak philippe 0.82 m/s    Ao root annulus 2.91 cm    STJ 2.51 cm    Ascending aorta 2.68 cm    IVC diameter 2.66 cm    ZLVIDS -2.23     ZLVIDD -5.83     LA Volume Index 36.3 mL/m2    LA volume 80.59 cm3    LA WIDTH 3.9 cm    RA Width 2.9 cm    TV resting pulmonary artery pressure 22 mmHg    RV TB RVSP 16 mmHg    Est. RA pres 15 mmHg    Narrative      Left Ventricle: The left ventricle is normal in size. Normal wall   thickness. There is concentric hypertrophy. Mild global hypokinesis   present. There is mildly reduced systolic function with a visually   estimated ejection fraction of 45 - 50%. There is diastolic dysfunction   but grade cannot be determined. Elevated left ventricular filling   pressure.    Right Ventricle: Normal right ventricular cavity size. Wall thickness   is normal. Right ventricle wall motion  is normal. Systolic function is   borderline low.    Left Atrium: Left atrium is mildly dilated.    Aortic Valve: Mildly calcified cusps. There is mild annular dilation   present. There is mild stenosis. Aortic valve area by VTI is 1.63 cm².   Aortic valve peak velocity is 2.22 m/s. Mean gradient is 11 mmHg. The   dimensionless index is 0.52. There is moderate aortic regurgitation.    Mitral Valve: There is moderate mitral annular calcification present.   There is mild regurgitation.    Tricuspid Valve: There is mild regurgitation.    Pulmonary Artery: The estimated pulmonary artery systolic pressure is   22 mmHg.    IVC/SVC: Elevated venous pressure at 15 mmHg.    Pericardium: There is a small circumferential effusion. No indication   of cardiac tamponade.    Supine/ventilated, STAT ER echo     , EKG: Reviewed, and X-Ray: CXR: X-Ray Chest 1 View (CXR): No results found for this visit on 01/15/24. and X-Ray  Chest PA and Lateral (CXR): No results found for this visit on 01/15/24.  Assessment and Plan:   Patient who presents s/p cardiac arrest. Remains critically ill. Continue supportive care. Prognosis seems to be poor. Will be available as needed.    MRSA bacteremia  -Mgmt as per critical care  -On abx    Influenza B  -Mgmt as per critical care    HTN (hypertension)  Titrate meds    ESRD (end stage renal disease)  -Mgmt as per nephrology    Cardiac arrest  Hep gtt ordered  Ischemia eval once awake and pending neuro status  Cont ICU supportive care  EKG with LBBB  No further VT reported  Cont Amio gtt, statin    1/17/24  -Continue OMT-amio gtt, statin  -ASA 81 mg if able to tolerate  -Remains critically ill  -Ischemic evaluation pending stability/recovery    1/28/24  -No change in CV status  -Continue ASA  -Statin once LFT's normalize  -Ischemic evaluation pending stability/recovery although prognosis seems to be poor        VTE Risk Mitigation (From admission, onward)           Ordered     heparin (porcine) injection 5,000 Units  Every 8 hours         01/18/24 0916     IP VTE HIGH RISK PATIENT  Once         01/15/24 1748     Place sequential compression device  Until discontinued         01/15/24 1748                    Sherly Valenzuela PA-C  Cardiology  [facility] ([facility])

## 2024-01-18 NOTE — NURSING
01/18/24 1630        Hemodialysis Catheter right subclavian   No placement date or time found.   Present Prior to Hospital Arrival?: Yes  Location: right subclavian   Line Necessity Review CRRT/HD   Site Assessment No drainage;No redness;No swelling;No warmth   Line Securement Device Secured with sutures   Dressing Type CHG impregnated dressing/sponge;Central line dressing   Dressing Status Clean;Dry;Intact   Dressing Intervention Integrity maintained   Venous Patency/Care flushed w/o difficulty;blood return present;heparin locked;intermittent infusion cap changed;deaccessed   Arterial Patency/Care flushed w/o difficulty;blood return present;heparin locked;intermittent infusion cap changed;deaccessed   Post-Hemodialysis Assessment   Blood Volume Processed (Liters) 71.1 L   Dialyzer Clearance Lightly streaked   Duration of Treatment 180 minutes   Total UF (mL) 3000 mL   Patient Response to Treatment Tolerated well   Post-Hemodialysis Comments Treatment completed. NET fluid removed = 3 liters. No issues during tx. Right CVC functioned well.

## 2024-01-18 NOTE — SUBJECTIVE & OBJECTIVE
Interval History: No acute events.  Neuro exam largely unchanged.  Remained on PSV overnight.  Hemodynamics stable.      Objective:     Vital Signs (Most Recent):  Temp: 98.4 °F (36.9 °C) (01/18/24 0720)  Pulse: 78 (01/18/24 1000)  Resp: (!) 21 (01/18/24 1000)  BP: (!) 145/66 (01/18/24 1000)  SpO2: 99 % (01/18/24 1000) Vital Signs (24h Range):  Temp:  [97.4 °F (36.3 °C)-99 °F (37.2 °C)] 98.4 °F (36.9 °C)  Pulse:  [67-83] 78  Resp:  [18-24] 21  SpO2:  [99 %-100 %] 99 %  BP: (124-172)/(55-77) 145/66     Weight: 108.9 kg (240 lb 1.3 oz)  Body mass index is 37.6 kg/m².      Intake/Output Summary (Last 24 hours) at 1/18/2024 1025  Last data filed at 1/18/2024 0900  Gross per 24 hour   Intake 1075.02 ml   Output 250 ml   Net 825.02 ml        Physical Exam  Vitals and nursing note reviewed.   Constitutional:       General: She is not in acute distress.     Comments: Intubated, no sedation   Cardiovascular:      Rate and Rhythm: Normal rate and regular rhythm.      Pulses: Normal pulses.      Heart sounds: No murmur heard.  Pulmonary:      Effort: Pulmonary effort is normal. No respiratory distress.      Breath sounds: No wheezing, rhonchi or rales.   Abdominal:      General: Abdomen is flat. There is no distension.      Palpations: Abdomen is soft.      Tenderness: There is no abdominal tenderness.   Musculoskeletal:      Right lower leg: No edema.      Left lower leg: No edema.   Neurological:      Comments: No response to voice, no withdrawal to pain, weak and delayed cough, spontaneous eyes movement,  pupillary and corneals intact.  Good respiratory effort.           Review of Systems    Vents:  Vent Mode: Spont (01/18/24 0913)  Set Rate: 20 BPM (01/17/24 0712)  Vt Set: 450 mL (01/17/24 0712)  Pressure Support: 7 cmH20 (01/18/24 0913)  PEEP/CPAP: 5 cmH20 (01/18/24 0913)  Oxygen Concentration (%): 30 (01/18/24 1000)  Peak Airway Pressure: 13 cmH20 (01/18/24 0913)  Plateau Pressure: 18 cmH20 (01/18/24 0913)  Total Ve:  9.65 L/m (01/18/24 0913)  Negative Inspiratory Force (cm H2O): 0 (01/18/24 0913)  F/VT Ratio<105 (RSBI): (!) 44.3 (01/18/24 0913)    Lines/Drains/Airways       Central Venous Catheter Line  Duration                  Hemodialysis Catheter right subclavian -- days              Drain  Duration                  NG/OG Tube 01/15/24 1333 orogastric Center mouth 2 days         Urethral Catheter 01/15/24 1332 Latex;Double-lumen 16 Fr. 2 days              Airway  Duration                  Airway - Non-Surgical 01/15/24 Endotracheal Tube 3 days              Peripheral Intravenous Line  Duration                  Peripheral IV - Single Lumen 01/15/24 1302 18 G Left Antecubital 2 days         Peripheral IV - Single Lumen 01/15/24 1313 18 G Right Antecubital 2 days         Peripheral IV - Single Lumen 01/15/24 1425 20 G Posterior;Right Hand 2 days                    Significant Labs:    CBC/Anemia Profile:  Recent Labs   Lab 01/17/24  0430 01/18/24  0419   WBC 17.22*  17.22* 15.24*  15.24*   HGB 10.4*  10.4* 9.8*  9.8*   HCT 32.3*  32.3* 31.8*  31.8*     356 337  337   MCV 94  94 95  95   RDW 14.6*  14.6* 14.6*  14.6*        Chemistries:  Recent Labs   Lab 01/17/24  0430 01/18/24  0419   * 133*   K 4.5 4.7    103   CO2 17* 17*   BUN 23 34*   CREATININE 4.6* 5.7*   CALCIUM 8.5* 7.7*   ALBUMIN 2.3* 2.2*   PROT 7.2 6.3   BILITOT 0.3 0.2   ALKPHOS 139* 127   ALT 41 35   AST 35 51*   MG 2.3 2.2   PHOS 4.4 5.1*       All pertinent labs within the past 24 hours have been reviewed.    Significant Imaging:  I have reviewed all pertinent imaging results/findings within the past 24 hours.

## 2024-01-18 NOTE — ASSESSMENT & PLAN NOTE
74 y/o female with ESRD on chronic HD presented after LOC:     ESRD (end stage renal disease)  ESRD on on chronic dialysis (PD x 1 year followed by HD x 1 month) at a HD unit q MW in [place]  Last HD was 2 days ago. Per h/o, HD was uneventful  Pt was found with LOC in her car some hours post HD  K normal  No significant acidemia  O2 sat good, intubated     No acute indications for HD today, though today is her regular HD day  Recommend await neurological prognosis before providing routine chronic HD support  Spoke with pt's son, explained, discussed. Family in agreement     Cardiac arrest  Occurred outside the hospital   Prolonged LOC  Chances of recovery less, risk for anoxic encephalopathy  Awaiting 72 hours post event to obtain neurologic evaluation     MRSA bacteremia  Reviewed the chart  Noted infected LE wounds     Influenza B  Noted diagnosis of influenza B     Endocrine  Type 2 diabetes mellitus with kidney complication, with long-term current use of insulin  Reviewed the chart  Long standing DM        Plans and recommendations:  As discussed above  Total critical care time spent 70 minutes including time needed to review the records, the   patient evaluation, documentation, face-to-face discussion with the patient,   more than 50% of the time was spent on coordination of care and counseling.
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
## 2024-01-18 NOTE — PLAN OF CARE
Remains off of sedation. Unresponsive. Pupils reactive but sluggish. Eyes deviated up. EEG completed- results pending.   Remains on spontaneous vent setting. No gag reflex. Weak cough.  NSR on monitor.  Tolerating tube feeds at goal rate of 40ml/hr.  5-40ml UOP/hr. See flowsheet.   HD today. Removed 3L.  Heparin gtt d/c per MD. Lovenox and Asprin scheduled.   Culture taken of left foot wound.   POC reviewed with daughter and son.

## 2024-01-18 NOTE — SUBJECTIVE & OBJECTIVE
Past Medical History:   Diagnosis Date    Diabetes mellitus     Diabetes mellitus with stage 4 chronic kidney disease     Hypertension     Kidney stones     MRSA (methicillin resistant staph aureus) culture positive     Renal disorder        Past Surgical History:   Procedure Laterality Date    FOOT SURGERY      kidney stent placement         Review of patient's allergies indicates:   Allergen Reactions    Tetanus vaccines and toxoid Nausea And Vomiting    Codeine Other (See Comments)     Upset stomach       Medications:  Medications Prior to Admission   Medication Sig    amlodipine (NORVASC) 10 MG tablet Take 10 mg by mouth once daily.    furosemide (LASIX) 40 MG tablet Take 40 mg by mouth 2 (two) times daily.    insulin aspart protamine-insulin aspart (NOVOLOG 70/30) 100 unit/mL (70-30) InPn pen Inject 28 Units into the skin 2 (two) times daily before meals.     losartan (COZAAR) 25 MG tablet Take 25 mg by mouth once daily.    metoprolol tartrate (LOPRESSOR) 100 MG tablet Take 100 mg by mouth 2 (two) times daily.    ondansetron (ZOFRAN) 4 MG tablet Take 1 tablet (4 mg total) by mouth every 8 (eight) hours as needed.    oxycodone-acetaminophen (PERCOCET)  mg per tablet Take 1 tablet by mouth every 4 (four) hours as needed for Pain.    pravastatin (PRAVACHOL) 40 MG tablet Take 40 mg by mouth once daily.    promethazine (PHENERGAN) 25 MG tablet Take 1 tablet (25 mg total) by mouth every 6 (six) hours as needed for Nausea.     Antibiotics (From admission, onward)      Start     Stop Route Frequency Ordered    01/17/24 2000  meropenem (MERREM) 500 mg in sodium chloride 0.9 % 100 mL IVPB (MB+)         -- IV Every 24 hours (non-standard times) 01/17/24 1458    01/16/24 1030  mupirocin 2 % ointment         01/21/24 0859 Nasl 2 times daily 01/16/24 0926    01/15/24 1410  vancomycin - pharmacy to dose  (vancomycin IVPB (PEDS and ADULTS))        See Sharri for full Linked Orders Report.    -- IV pharmacy to manage  frequency 01/15/24 1310          Antifungals (From admission, onward)      None          Antivirals (From admission, onward)      None             Immunization History   Administered Date(s) Administered    COVID-19, MRNA, LN-S, PF (MODERNA FULL 0.5 ML DOSE) 03/04/2021, 04/01/2021       Family History       Problem Relation (Age of Onset)    Hypertension Mother, Father          Social History     Socioeconomic History    Marital status:    Tobacco Use    Smoking status: Former   Substance and Sexual Activity    Alcohol use: No    Drug use: No    Sexual activity: Yes     Social Determinants of Health     Financial Resource Strain: Patient Unable To Answer (1/17/2024)    Overall Financial Resource Strain (CARDIA)     Difficulty of Paying Living Expenses: Patient unable to answer   Food Insecurity: Patient Unable To Answer (1/17/2024)    Hunger Vital Sign     Worried About Running Out of Food in the Last Year: Patient unable to answer     Ran Out of Food in the Last Year: Patient unable to answer   Transportation Needs: Patient Unable To Answer (1/17/2024)    PRAPARE - Transportation     Lack of Transportation (Medical): Patient unable to answer     Lack of Transportation (Non-Medical): Patient unable to answer   Stress: Patient Unable To Answer (1/17/2024)    [employer] - Occupational Stress Questionnaire     Feeling of Stress : Patient unable to answer   Housing Stability: Patient Unable To Answer (1/17/2024)    Housing Stability Vital Sign     Unable to Pay for Housing in the Last Year: Patient unable to answer     Unstable Housing in the Last Year: Patient unable to answer     Review of Systems   Unable to perform ROS: Intubated     Objective:     Vital Signs (Most Recent):  Temp: 98.2 °F (36.8 °C) (01/18/24 0500)  Pulse: 75 (01/18/24 0720)  Resp: 20 (01/18/24 0720)  BP: 136/65 (01/18/24 0720)  SpO2: 100 % (01/18/24 0720) Vital Signs (24h Range):  Temp:  [97.4 °F (36.3 °C)-99  °F (37.2 °C)] 98.2 °F (36.8 °C)  Pulse:  [67-83] 75  Resp:  [18-24] 20  SpO2:  [100 %] 100 %  BP: (136-172)/(55-77) 136/65     Weight: 108.9 kg (240 lb 1.3 oz)  Body mass index is 37.6 kg/m².    Estimated Creatinine Clearance: 11.5 mL/min (A) (based on SCr of 5.7 mg/dL (H)).     Physical Exam  Vitals and nursing note reviewed.   Constitutional:       Comments: intubated   Eyes:      General:         Right eye: No discharge.         Left eye: No discharge.   Pulmonary:      Effort: Pulmonary effort is normal.      Comments: intubated  Abdominal:      General: There is no distension.      Palpations: There is no mass.   Musculoskeletal:         General: No swelling.      Comments: See wound care pics   Neurological:      Comments: Intubated /sedated          Significant Labs: Blood Culture:   Recent Labs   Lab 01/15/24  1308 01/15/24  1309 01/17/24  0909   LABBLOO Gram stain aer bottle: Gram positive cocci in clusters resembling Staph  Results called to and read back by: Gladis Ricci RN 01/16/2024  08:16  Gram stain olegario bottle: Gram positive cocci in clusters resembling Staph  Positive results previously called 01/16/2024  10:29  STAPHYLOCOCCUS AUREUS  ID consult required at [facility].  For susceptibility see order #[id number]  * Gram stain aer bottle: Gram positive cocci in clusters resembling Staph  Results called to and read back by: Gladis Ricci RN 01/16/2024  08:16  Gram stain olegario bottle: Gram positive cocci in clusters resembling Staph  Positive results previously called 01/16/2024  10:29  STAPHYLOCOCCUS AUREUS  Susceptibility pending  ID consult required at [facility].  * No Growth to date  No Growth to date     CBC:   Recent Labs   Lab 01/17/24  0430 01/18/24  0419   WBC 17.22*  17.22* 15.24*  15.24*   HGB 10.4*  10.4* 9.8*  9.8*   HCT 32.3*  32.3* 31.8*  31.8*     356 337  337     CMP:   Recent Labs   Lab 01/17/24  4685  "01/18/24  0419   * 133*   K 4.5 4.7    103   CO2 17* 17*   * 244*   BUN 23 34*   CREATININE 4.6* 5.7*   CALCIUM 8.5* 7.7*   PROT 7.2 6.3   ALBUMIN 2.3* 2.2*   BILITOT 0.3 0.2   ALKPHOS 139* 127   AST 35 51*   ALT 41 35   ANIONGAP 13 13     Wound Culture: No results for input(s): "LABAERO" in the last 4320 hours.  All pertinent labs within the past 24 hours have been reviewed.    Significant Imaging: I have reviewed all pertinent imaging results/findings within the past 24 hours.  "

## 2024-01-18 NOTE — CARE UPDATE
Patient seen and examined  Patient is intubated  MRSA bacteremia  Previous cultures- 12/26/23    Component 3 wk ago Comments   Aerobic Culture  Abnormal   Staphylococcus aureus  Moderate growth Methicillin resistant (MRSA)   Aerobic Culture  Abnormal   Proteus mirabilis  Scant growth Susceptibility profile is consistent with a probable ESBL.   Aerobic Culture Mixed skin liam  Moderate growth      The proteus is Rocephin resistant  Will use Vanco/meropenem    Full note to follow

## 2024-01-19 PROBLEM — A41.9 SEVERE SEPSIS: Status: ACTIVE | Noted: 2024-01-19

## 2024-01-19 PROBLEM — R65.20 SEVERE SEPSIS: Status: ACTIVE | Noted: 2024-01-19

## 2024-01-19 PROBLEM — I47.20 VENTRICULAR TACHYCARDIA: Status: ACTIVE | Noted: 2024-01-19

## 2024-01-19 LAB
ALBUMIN SERPL BCP-MCNC: 2.3 G/DL (ref 3.5–5.2)
ALLENS TEST: ABNORMAL
ALP SERPL-CCNC: 145 U/L (ref 55–135)
ALT SERPL W/O P-5'-P-CCNC: 31 U/L (ref 10–44)
ANION GAP SERPL CALC-SCNC: 12 MMOL/L (ref 8–16)
ANION GAP SERPL CALC-SCNC: 13 MMOL/L (ref 8–16)
AST SERPL-CCNC: 35 U/L (ref 10–40)
BASOPHILS # BLD AUTO: 0.07 K/UL (ref 0–0.2)
BASOPHILS NFR BLD: 0.3 % (ref 0–1.9)
BILIRUB SERPL-MCNC: 0.3 MG/DL (ref 0.1–1)
BUN SERPL-MCNC: 31 MG/DL (ref 8–23)
BUN SERPL-MCNC: 34 MG/DL (ref 8–23)
CALCIUM SERPL-MCNC: 8.7 MG/DL (ref 8.7–10.5)
CALCIUM SERPL-MCNC: 8.8 MG/DL (ref 8.7–10.5)
CHLORIDE SERPL-SCNC: 100 MMOL/L (ref 95–110)
CHLORIDE SERPL-SCNC: 100 MMOL/L (ref 95–110)
CO2 SERPL-SCNC: 20 MMOL/L (ref 23–29)
CO2 SERPL-SCNC: 20 MMOL/L (ref 23–29)
CREAT SERPL-MCNC: 4.5 MG/DL (ref 0.5–1.4)
CREAT SERPL-MCNC: 4.6 MG/DL (ref 0.5–1.4)
DELSYS: ABNORMAL
DIFFERENTIAL METHOD BLD: ABNORMAL
EOSINOPHIL # BLD AUTO: 0 K/UL (ref 0–0.5)
EOSINOPHIL NFR BLD: 0 % (ref 0–8)
ERYTHROCYTE [DISTWIDTH] IN BLOOD BY AUTOMATED COUNT: 14.7 % (ref 11.5–14.5)
ERYTHROCYTE [SEDIMENTATION RATE] IN BLOOD BY WESTERGREN METHOD: 20 MM/H
EST. GFR  (NO RACE VARIABLE): 10 ML/MIN/1.73 M^2
EST. GFR  (NO RACE VARIABLE): 10 ML/MIN/1.73 M^2
FIO2: 30
GLUCOSE SERPL-MCNC: 461 MG/DL (ref 70–110)
GLUCOSE SERPL-MCNC: 477 MG/DL (ref 70–110)
HCO3 UR-SCNC: 20.4 MMOL/L (ref 24–28)
HCT VFR BLD AUTO: 33.9 % (ref 37–48.5)
HGB BLD-MCNC: 10.9 G/DL (ref 12–16)
IMM GRANULOCYTES # BLD AUTO: 0.31 K/UL (ref 0–0.04)
IMM GRANULOCYTES NFR BLD AUTO: 1.5 % (ref 0–0.5)
LYMPHOCYTES # BLD AUTO: 0.4 K/UL (ref 1–4.8)
LYMPHOCYTES NFR BLD: 2 % (ref 18–48)
MAGNESIUM SERPL-MCNC: 2.1 MG/DL (ref 1.6–2.6)
MAGNESIUM SERPL-MCNC: 2.1 MG/DL (ref 1.6–2.6)
MCH RBC QN AUTO: 29.9 PG (ref 27–31)
MCHC RBC AUTO-ENTMCNC: 32.2 G/DL (ref 32–36)
MCV RBC AUTO: 93 FL (ref 82–98)
MODE: ABNORMAL
MONOCYTES # BLD AUTO: 1.3 K/UL (ref 0.3–1)
MONOCYTES NFR BLD: 6.1 % (ref 4–15)
NEUTROPHILS # BLD AUTO: 18.5 K/UL (ref 1.8–7.7)
NEUTROPHILS NFR BLD: 90.1 % (ref 38–73)
NRBC BLD-RTO: 0 /100 WBC
PCO2 BLDA: 27.4 MMHG (ref 35–45)
PEEP: 5
PH SMN: 7.48 [PH] (ref 7.35–7.45)
PHOSPHATE SERPL-MCNC: 3.4 MG/DL (ref 2.7–4.5)
PLATELET # BLD AUTO: 370 K/UL (ref 150–450)
PMV BLD AUTO: 9.4 FL (ref 9.2–12.9)
PO2 BLDA: 85 MMHG (ref 80–100)
POC BE: -3 MMOL/L
POC SATURATED O2: 97 % (ref 95–100)
POCT GLUCOSE: 133 MG/DL (ref 70–110)
POCT GLUCOSE: 162 MG/DL (ref 70–110)
POCT GLUCOSE: 308 MG/DL (ref 70–110)
POCT GLUCOSE: 342 MG/DL (ref 70–110)
POCT GLUCOSE: 357 MG/DL (ref 70–110)
POCT GLUCOSE: 391 MG/DL (ref 70–110)
POCT GLUCOSE: 396 MG/DL (ref 70–110)
POTASSIUM SERPL-SCNC: 3.9 MMOL/L (ref 3.5–5.1)
POTASSIUM SERPL-SCNC: 3.9 MMOL/L (ref 3.5–5.1)
PROT SERPL-MCNC: 7.4 G/DL (ref 6–8.4)
RBC # BLD AUTO: 3.64 M/UL (ref 4–5.4)
SAMPLE: ABNORMAL
SITE: ABNORMAL
SODIUM SERPL-SCNC: 132 MMOL/L (ref 136–145)
SODIUM SERPL-SCNC: 133 MMOL/L (ref 136–145)
VT: 450
WBC # BLD AUTO: 20.54 K/UL (ref 3.9–12.7)

## 2024-01-19 PROCEDURE — 27200966 HC CLOSED SUCTION SYSTEM

## 2024-01-19 PROCEDURE — 94003 VENT MGMT INPAT SUBQ DAY: CPT

## 2024-01-19 PROCEDURE — 27000207 HC ISOLATION

## 2024-01-19 PROCEDURE — 27100171 HC OXYGEN HIGH FLOW UP TO 24 HOURS

## 2024-01-19 PROCEDURE — 63600175 PHARM REV CODE 636 W HCPCS: Performed by: PHYSICIAN ASSISTANT

## 2024-01-19 PROCEDURE — 84100 ASSAY OF PHOSPHORUS: CPT | Performed by: INTERNAL MEDICINE

## 2024-01-19 PROCEDURE — 83735 ASSAY OF MAGNESIUM: CPT | Performed by: NURSE PRACTITIONER

## 2024-01-19 PROCEDURE — 80053 COMPREHEN METABOLIC PANEL: CPT | Performed by: INTERNAL MEDICINE

## 2024-01-19 PROCEDURE — 25000003 PHARM REV CODE 250: Performed by: INTERNAL MEDICINE

## 2024-01-19 PROCEDURE — 25000003 PHARM REV CODE 250: Performed by: NURSE PRACTITIONER

## 2024-01-19 PROCEDURE — 63600175 PHARM REV CODE 636 W HCPCS: Performed by: NURSE PRACTITIONER

## 2024-01-19 PROCEDURE — 83735 ASSAY OF MAGNESIUM: CPT | Mod: 91 | Performed by: INTERNAL MEDICINE

## 2024-01-19 PROCEDURE — 25000003 PHARM REV CODE 250: Performed by: PHYSICIAN ASSISTANT

## 2024-01-19 PROCEDURE — 99232 SBSQ HOSP IP/OBS MODERATE 35: CPT | Mod: ,,, | Performed by: PHYSICIAN ASSISTANT

## 2024-01-19 PROCEDURE — 20000000 HC ICU ROOM

## 2024-01-19 PROCEDURE — 80048 BASIC METABOLIC PNL TOTAL CA: CPT | Performed by: NURSE PRACTITIONER

## 2024-01-19 PROCEDURE — 63600175 PHARM REV CODE 636 W HCPCS: Performed by: INTERNAL MEDICINE

## 2024-01-19 PROCEDURE — 36415 COLL VENOUS BLD VENIPUNCTURE: CPT | Performed by: NURSE PRACTITIONER

## 2024-01-19 PROCEDURE — 99900026 HC AIRWAY MAINTENANCE (STAT)

## 2024-01-19 PROCEDURE — 85025 COMPLETE CBC W/AUTO DIFF WBC: CPT | Performed by: INTERNAL MEDICINE

## 2024-01-19 PROCEDURE — 36600 WITHDRAWAL OF ARTERIAL BLOOD: CPT

## 2024-01-19 PROCEDURE — 63600175 PHARM REV CODE 636 W HCPCS

## 2024-01-19 PROCEDURE — 99900035 HC TECH TIME PER 15 MIN (STAT)

## 2024-01-19 PROCEDURE — 94761 N-INVAS EAR/PLS OXIMETRY MLT: CPT

## 2024-01-19 PROCEDURE — 82803 BLOOD GASES ANY COMBINATION: CPT

## 2024-01-19 PROCEDURE — 36415 COLL VENOUS BLD VENIPUNCTURE: CPT | Mod: XB | Performed by: INTERNAL MEDICINE

## 2024-01-19 RX ORDER — HYDRALAZINE HYDROCHLORIDE 20 MG/ML
10 INJECTION INTRAMUSCULAR; INTRAVENOUS EVERY 4 HOURS PRN
Status: DISCONTINUED | OUTPATIENT
Start: 2024-01-19 | End: 2024-01-23 | Stop reason: HOSPADM

## 2024-01-19 RX ORDER — GLUCAGON 1 MG
1 KIT INJECTION
Status: DISCONTINUED | OUTPATIENT
Start: 2024-01-19 | End: 2024-01-23

## 2024-01-19 RX ORDER — AMOXICILLIN 250 MG
2 CAPSULE ORAL DAILY
Status: DISCONTINUED | OUTPATIENT
Start: 2024-01-19 | End: 2024-01-22

## 2024-01-19 RX ORDER — INSULIN ASPART 100 [IU]/ML
0-15 INJECTION, SOLUTION INTRAVENOUS; SUBCUTANEOUS EVERY 4 HOURS PRN
Status: DISCONTINUED | OUTPATIENT
Start: 2024-01-19 | End: 2024-01-19

## 2024-01-19 RX ORDER — INSULIN ASPART 100 [IU]/ML
10 INJECTION, SOLUTION INTRAVENOUS; SUBCUTANEOUS ONCE
Status: COMPLETED | OUTPATIENT
Start: 2024-01-19 | End: 2024-01-19

## 2024-01-19 RX ORDER — INSULIN ASPART 100 [IU]/ML
10 INJECTION, SUSPENSION SUBCUTANEOUS ONCE
Status: DISCONTINUED | OUTPATIENT
Start: 2024-01-19 | End: 2024-01-19

## 2024-01-19 RX ORDER — METOPROLOL TARTRATE 50 MG/1
50 TABLET ORAL 2 TIMES DAILY
Status: DISCONTINUED | OUTPATIENT
Start: 2024-01-19 | End: 2024-01-23

## 2024-01-19 RX ORDER — INSULIN ASPART 100 [IU]/ML
0-15 INJECTION, SOLUTION INTRAVENOUS; SUBCUTANEOUS EVERY 4 HOURS PRN
Status: DISCONTINUED | OUTPATIENT
Start: 2024-01-19 | End: 2024-01-23

## 2024-01-19 RX ADMIN — MUPIROCIN: 20 OINTMENT TOPICAL at 08:01

## 2024-01-19 RX ADMIN — AMIODARONE HYDROCHLORIDE 0.5 MG/MIN: 1.8 INJECTION, SOLUTION INTRAVENOUS at 08:01

## 2024-01-19 RX ADMIN — PRAVASTATIN SODIUM 40 MG: 20 TABLET ORAL at 08:01

## 2024-01-19 RX ADMIN — CHLORHEXIDINE GLUCONATE 0.12% ORAL RINSE 15 ML: 1.2 LIQUID ORAL at 08:01

## 2024-01-19 RX ADMIN — INSULIN HUMAN 10 UNITS: 100 INJECTION, SOLUTION PARENTERAL at 04:01

## 2024-01-19 RX ADMIN — FAMOTIDINE 20 MG: 40 POWDER, FOR SUSPENSION ORAL at 08:01

## 2024-01-19 RX ADMIN — SENNOSIDES AND DOCUSATE SODIUM 2 TABLET: 8.6; 5 TABLET ORAL at 09:01

## 2024-01-19 RX ADMIN — MEROPENEM 500 MG: 500 INJECTION INTRAVENOUS at 07:01

## 2024-01-19 RX ADMIN — METOPROLOL TARTRATE 50 MG: 50 TABLET, FILM COATED ORAL at 12:01

## 2024-01-19 RX ADMIN — AMIODARONE HYDROCHLORIDE 150 MG: 1.5 INJECTION, SOLUTION INTRAVENOUS at 01:01

## 2024-01-19 RX ADMIN — ASPIRIN 81 MG CHEWABLE TABLET 81 MG: 81 TABLET CHEWABLE at 08:01

## 2024-01-19 RX ADMIN — HEPARIN SODIUM 5000 UNITS: 5000 INJECTION INTRAVENOUS; SUBCUTANEOUS at 01:01

## 2024-01-19 RX ADMIN — AMIODARONE HYDROCHLORIDE 1 MG/MIN: 1.8 INJECTION, SOLUTION INTRAVENOUS at 01:01

## 2024-01-19 RX ADMIN — HEPARIN SODIUM 5000 UNITS: 5000 INJECTION INTRAVENOUS; SUBCUTANEOUS at 09:01

## 2024-01-19 RX ADMIN — INSULIN ASPART 12 UNITS: 100 INJECTION, SOLUTION INTRAVENOUS; SUBCUTANEOUS at 06:01

## 2024-01-19 RX ADMIN — INSULIN ASPART 15 UNITS: 100 INJECTION, SOLUTION INTRAVENOUS; SUBCUTANEOUS at 08:01

## 2024-01-19 RX ADMIN — INSULIN ASPART 10 UNITS: 100 INJECTION, SOLUTION INTRAVENOUS; SUBCUTANEOUS at 02:01

## 2024-01-19 RX ADMIN — INSULIN ASPART 5 UNITS: 100 INJECTION, SOLUTION INTRAVENOUS; SUBCUTANEOUS at 12:01

## 2024-01-19 RX ADMIN — INSULIN ASPART 12 UNITS: 100 INJECTION, SOLUTION INTRAVENOUS; SUBCUTANEOUS at 11:01

## 2024-01-19 RX ADMIN — INSULIN DETEMIR 20 UNITS: 100 INJECTION, SOLUTION SUBCUTANEOUS at 09:01

## 2024-01-19 RX ADMIN — HEPARIN SODIUM 5000 UNITS: 5000 INJECTION INTRAVENOUS; SUBCUTANEOUS at 05:01

## 2024-01-19 RX ADMIN — POLYETHYLENE GLYCOL 3350 17 G: 17 POWDER, FOR SOLUTION ORAL at 08:01

## 2024-01-19 RX ADMIN — METOPROLOL TARTRATE 50 MG: 50 TABLET, FILM COATED ORAL at 08:01

## 2024-01-19 RX ADMIN — HYDRALAZINE HYDROCHLORIDE 10 MG: 20 INJECTION, SOLUTION INTRAMUSCULAR; INTRAVENOUS at 12:01

## 2024-01-19 RX ADMIN — AMIODARONE HYDROCHLORIDE 1 MG/MIN: 1.8 INJECTION, SOLUTION INTRAVENOUS at 07:01

## 2024-01-19 RX ADMIN — INSULIN ASPART 15 UNITS: 100 INJECTION, SOLUTION INTRAVENOUS; SUBCUTANEOUS at 10:01

## 2024-01-19 NOTE — SUBJECTIVE & OBJECTIVE
Interval History: Some increasing frequency of short runs of VT overnight and she was started back on the amiodarone drip.  Hemodynamics and vent are stable.  Neuro exam largely unchanged.      Objective:     Vital Signs (Most Recent):  Temp: 98.2 °F (36.8 °C) (01/19/24 0715)  Pulse: 66 (01/19/24 0945)  Resp: (!) 22 (01/19/24 0945)  BP: (!) 145/66 (01/19/24 0945)  SpO2: 99 % (01/19/24 0945) Vital Signs (24h Range):  Temp:  [98 °F (36.7 °C)-99 °F (37.2 °C)] 98.2 °F (36.8 °C)  Pulse:  [] 66  Resp:  [21-35] 22  SpO2:  [93 %-100 %] 99 %  BP: (135-197)/(55-91) 145/66     Weight: 108.9 kg (240 lb 1.3 oz)  Body mass index is 37.6 kg/m².      Intake/Output Summary (Last 24 hours) at 1/19/2024 1141  Last data filed at 1/19/2024 1100  Gross per 24 hour   Intake 1787.87 ml   Output 3187 ml   Net -1399.13 ml        Physical Exam  Vitals and nursing note reviewed.   Constitutional:       General: She is not in acute distress.     Comments: Intubated, no sedation   Cardiovascular:      Rate and Rhythm: Normal rate and regular rhythm.      Pulses: Normal pulses.      Heart sounds: No murmur heard.  Pulmonary:      Effort: Pulmonary effort is normal. No respiratory distress.      Breath sounds: No wheezing, rhonchi or rales.   Abdominal:      General: Abdomen is flat. There is no distension.      Palpations: Abdomen is soft.      Tenderness: There is no abdominal tenderness.   Musculoskeletal:      Right lower leg: No edema.      Left lower leg: Edema present.   Neurological:      Comments: No cough for me, intact pupillary response, spontaneous respirations, no withdrawal to pain           Review of Systems    Vents:  Vent Mode: A/C (01/19/24 0855)  Set Rate: 16 BPM (01/19/24 0855)  Vt Set: 450 mL (01/19/24 0855)  Pressure Support: 7 cmH20 (01/18/24 1923)  PEEP/CPAP: 5 cmH20 (01/19/24 0855)  Oxygen Concentration (%): 30 (01/19/24 1101)  Peak Airway Pressure: 29 cmH20 (01/19/24 0855)  Plateau Pressure: 18 cmH20 (01/19/24  0855)  Total Ve: 10.5 L/m (01/19/24 0855)  Negative Inspiratory Force (cm H2O): 0 (01/19/24 0855)  F/VT Ratio<105 (RSBI): (!) 52.63 (01/19/24 0855)    Lines/Drains/Airways       Central Venous Catheter Line  Duration                  Hemodialysis Catheter right subclavian -- days              Drain  Duration                  NG/OG Tube 01/15/24 1333 orogastric Center mouth 3 days         Urethral Catheter 01/15/24 1332 Latex;Double-lumen 16 Fr. 3 days              Airway  Duration                  Airway - Non-Surgical 01/15/24 Endotracheal Tube 4 days              Peripheral Intravenous Line  Duration                  Peripheral IV - Single Lumen 01/15/24 1302 18 G Left Antecubital 3 days         Peripheral IV - Single Lumen 01/15/24 1313 18 G Right Antecubital 3 days         Peripheral IV - Single Lumen 01/19/24 0115 20 G Anterior;Left Forearm <1 day                    Significant Labs:    CBC/Anemia Profile:  Recent Labs   Lab 01/18/24 0419 01/19/24  0325   WBC 15.24*  15.24* 20.54*   HGB 9.8*  9.8* 10.9*   HCT 31.8*  31.8* 33.9*     337 370   MCV 95  95 93   RDW 14.6*  14.6* 14.7*        Chemistries:  Recent Labs   Lab 01/18/24  0419 01/19/24  0156 01/19/24  0325   * 132* 133*   K 4.7 3.9 3.9    100 100   CO2 17* 20* 20*   BUN 34* 31* 34*   CREATININE 5.7* 4.5* 4.6*   CALCIUM 7.7* 8.7 8.8   ALBUMIN 2.2*  --  2.3*   PROT 6.3  --  7.4   BILITOT 0.2  --  0.3   ALKPHOS 127  --  145*   ALT 35  --  31   AST 51*  --  35   MG 2.2 2.1 2.1   PHOS 5.1*  --  3.4       All pertinent labs within the past 24 hours have been reviewed.    Significant Imaging:  I have reviewed all pertinent imaging results/findings within the past 24 hours.

## 2024-01-19 NOTE — ASSESSMENT & PLAN NOTE
- initially thought Cardiac most likely given runs of NSVT and good oxgenation, but now blood cultures are showing Staph (though no evidence of true septic shock given stable hemodynamics)  - empiric Abx  - Cardiology following  - back on the amio gtt   - replete electrolytes cautiously given ESRD  - Echo with mildly decreased LVEF  - Cardiac monitoring  - avoid fever  - initial CT Head unremarkable  - EEG with severe diffuse encephalopathy

## 2024-01-19 NOTE — PLAN OF CARE
Remains intubated. FiO2 30% PEEP5. Weak cough intermittently.   No sedation. Unresponsive. Positive pupil response. No gag.  NSR with many runs of v tach this AM. Amio gtt increased back to 1mg/min per cardiology. No vtach events after increasing amio.   Tube feeds remain at goal. BG increased. MD aware. Changes made to insulin regimen. Checking BG q4hr.   Reis cath in place with minimal UOP.   POC reviewed with family.

## 2024-01-19 NOTE — ASSESSMENT & PLAN NOTE
- MWF via left chest vascath  - no acute indication  - Nephrology consulted  - tolerated dialysis 1/18  - renally dose meds and avoid nephrotoxins

## 2024-01-19 NOTE — ASSESSMENT & PLAN NOTE
Source control is needed    Echo done- 1/15- no veg  Will follow repeat blood cultures    1/18- will continue Vancomycin, follow repeat blood culture

## 2024-01-19 NOTE — PROGRESS NOTES
[facility] ([facility])  Cardiology  Progress Note    Patient Name: Sydney Lejeune  MRN: [id number]  Admission Date: 1/15/2024  Hospital Length of Stay: 4 days  Code Status: Full Code   Attending Physician: David Barrios MD   Primary Care Physician: No, Primary Doctor  Expected Discharge Date:   Principal Problem:<principal problem not specified>    Subjective:   HPI:  Ms Lejeune is a 72 y/o WF with ESRD, DM, HTN, and chronic left foot wound with osteo presents via EMS to the ER today after being found slumped over her steering wheel at a gas station.  History is taken from the medical record as the patient is unable to provide history and no family available at the time of my exam.  On EMS arrival, she was pulseless and apneic.  ROSC achieved following 6 rounds of CPR (Epi x 1 and 300mg of Amio).  She was intubated in the ER upon arrival.  Hemodynamics stable and low/mod vent requirements but ongoign short runs of NSVT. Currently on amio gtt and propofol.  Initial CT Head unremarkable.  Labs notable for Lactic of 8, WBC 23.89, Hgb 9.8, K 3.6, BNP 1466, Trop 0.043, positive Flu B.  ABG shows metabolic acidosis with good respiratory compensation and pO2 505.  She is admitted to the ICU for post-arrest care.     Cardiology consulted to assist with management. Chart reviewed, pt on isolation for Flu, pt intubated/sedated. No more VT noted per chart review. Discussed care with ICU MD. Labs reviewed, troponin trending down 4.0-3.5, H/H 8 and 27, Crt 3.4, Blood cultures positive for staph. Ech EF 45-50% mild AS. Pt was schedule for shunt sx 1/18 per chart review       Hospital Course:   1/17/24-Chart reviewed. Patient remains intubated/critically ill. No purposeful movements/responses per nursing note. No arrhythmias documented. Labs reviewed. Creatinine 4.6. H/H stable. BC + for MRSA, on abx.     1/18/24-Chart reviewed. No change in CV status. No arrhythmias, on po amiodarone. ASA initiated. No purposeful  responses to stimuli per documentation. Labs reviewed.     1/19/24-Chat reviewed. Increased runs of VT/ectopy. Amiodarone gtt resumed. Labs reviewed. K 3.9. Mg 2.1. Remains intubated. No purposeful responses per chart.        Review of Systems   Unable to perform ROS: Acuity of condition     Objective:     Vital Signs (Most Recent):  Temp: 98.2 °F (36.8 °C) (01/19/24 0715)  Pulse: 66 (01/19/24 0945)  Resp: (!) 22 (01/19/24 0945)  BP: (!) 145/66 (01/19/24 0945)  SpO2: 99 % (01/19/24 0945) Vital Signs (24h Range):  Temp:  [98 °F (36.7 °C)-99 °F (37.2 °C)] 98.2 °F (36.8 °C)  Pulse:  [] 66  Resp:  [21-35] 22  SpO2:  [93 %-100 %] 99 %  BP: (135-197)/(55-91) 145/66     Weight: 108.9 kg (240 lb 1.3 oz)  Body mass index is 37.6 kg/m².     SpO2: 99 %         Intake/Output Summary (Last 24 hours) at 1/19/2024 1154  Last data filed at 1/19/2024 1100  Gross per 24 hour   Intake 1787.87 ml   Output 3187 ml   Net -1399.13 ml       Lines/Drains/Airways       Central Venous Catheter Line  Duration                  Hemodialysis Catheter right subclavian -- days              Drain  Duration                  NG/OG Tube 01/15/24 1333 orogastric Center mouth 3 days         Urethral Catheter 01/15/24 1332 Latex;Double-lumen 16 Fr. 3 days              Airway  Duration                  Airway - Non-Surgical 01/15/24 Endotracheal Tube 4 days              Peripheral Intravenous Line  Duration                  Peripheral IV - Single Lumen 01/15/24 1302 18 G Left Antecubital 3 days         Peripheral IV - Single Lumen 01/15/24 1313 18 G Right Antecubital 3 days         Peripheral IV - Single Lumen 01/19/24 0115 20 G Anterior;Left Forearm <1 day                       Physical Exam  Vitals and nursing note reviewed.            Significant Labs: CMP   Recent Labs   Lab 01/18/24  0419 01/19/24  0156 01/19/24  0325   * 132* 133*   K 4.7 3.9 3.9    100 100   CO2 17* 20* 20*   * 461* 477*   BUN 34* 31* 34*   CREATININE 5.7*  "4.5* 4.6*   CALCIUM 7.7* 8.7 8.8   PROT 6.3  --  7.4   ALBUMIN 2.2*  --  2.3*   BILITOT 0.2  --  0.3   ALKPHOS 127  --  145*   AST 51*  --  35   ALT 35  --  31   ANIONGAP 13 12 13   , CBC   Recent Labs   Lab 01/18/24  0419 01/19/24  0325   WBC 15.24*  15.24* 20.54*   HGB 9.8*  9.8* 10.9*   HCT 31.8*  31.8* 33.9*     337 370   , Troponin No results for input(s): "TROPONINI" in the last 48 hours., and All pertinent lab results from the last 24 hours have been reviewed.    Significant Imaging: Echocardiogram: Transthoracic echo (TTE) complete (Cupid Only):   Results for orders placed or performed during the hospital encounter of 01/15/24   Echo   Result Value Ref Range    BSA 2.32 m2    LVOT stroke volume 88.23 cm3    LVIDd 4.33 3.5 - 6.0 cm    LV Systolic Volume 52.79 mL    LV Systolic Volume Index 23.8 mL/m2    LVIDs 3.55 2.1 - 4.0 cm    LV Diastolic Volume 84.55 mL    LV Diastolic Volume Index 38.09 mL/m2    IVS 1.46 (A) 0.6 - 1.1 cm    LVOT diameter 2.00 cm    LVOT area 3.1 cm2    FS 18 (A) 28 - 44 %    Left Ventricle Relative Wall Thickness 0.85 cm    Posterior Wall 1.83 (A) 0.6 - 1.1 cm    LV mass 301.17 g    LV Mass Index 136 g/m2    MV Peak E Cody 0.95 m/s    TDI LATERAL 0.04 m/s    MV Peak A Cody 1.22 m/s    TR Max Cody 1.29 m/s    E/A ratio 0.78     IVRT 102.76 msec    E wave deceleration time 249.44 msec    LV LATERAL E/E' RATIO 23.75 m/s    LVOT peak cody 1.26 m/s    Left Ventricular Outflow Tract Mean Velocity 0.88 cm/s    Left Ventricular Outflow Tract Mean Gradient 3.42 mmHg    RVDD 3.54 cm    RV S' 12.74 cm/s    RVOT peak VTI 16.9 cm    TAPSE 1.92 cm    LA size 3.69 cm    Left Atrium Minor Axis 6.80 cm    Left Atrium Major Axis 6.39 cm    RA Major Axis 6.30 cm    AV regurgitation pressure 1/2 time 497.537830738855573 ms    AR Max Cody 4.43 m/s    AV mean gradient 11 mmHg    AV peak gradient 20 mmHg    Ao peak cody 2.22 m/s    Ao VTI 54.00 cm    LVOT peak VTI 28.10 cm    AV valve area 1.63 cm²    AV " Velocity Ratio 0.57     AV index (prosthetic) 0.52     DB by Velocity Ratio 1.78 cm²    MV stenosis pressure 1/2 time 72.34 ms    MV valve area p 1/2 method 3.04 cm2    Triscuspid Valve Regurgitation Peak Gradient 7 mmHg    PV mean gradient 1 mmHg    PV PEAK VELOCITY 1.14 m/s    PV peak gradient 5 mmHg    Pulmonary Valve Mean Velocity 0.67 m/s    RVOT peak philippe 0.82 m/s    Ao root annulus 2.91 cm    STJ 2.51 cm    Ascending aorta 2.68 cm    IVC diameter 2.66 cm    ZLVIDS -2.23     ZLVIDD -5.83     LA Volume Index 36.3 mL/m2    LA volume 80.59 cm3    LA WIDTH 3.9 cm    RA Width 2.9 cm    TV resting pulmonary artery pressure 22 mmHg    RV TB RVSP 16 mmHg    Est. RA pres 15 mmHg    Narrative      Left Ventricle: The left ventricle is normal in size. Normal wall   thickness. There is concentric hypertrophy. Mild global hypokinesis   present. There is mildly reduced systolic function with a visually   estimated ejection fraction of 45 - 50%. There is diastolic dysfunction   but grade cannot be determined. Elevated left ventricular filling   pressure.    Right Ventricle: Normal right ventricular cavity size. Wall thickness   is normal. Right ventricle wall motion  is normal. Systolic function is   borderline low.    Left Atrium: Left atrium is mildly dilated.    Aortic Valve: Mildly calcified cusps. There is mild annular dilation   present. There is mild stenosis. Aortic valve area by VTI is 1.63 cm².   Aortic valve peak velocity is 2.22 m/s. Mean gradient is 11 mmHg. The   dimensionless index is 0.52. There is moderate aortic regurgitation.    Mitral Valve: There is moderate mitral annular calcification present.   There is mild regurgitation.    Tricuspid Valve: There is mild regurgitation.    Pulmonary Artery: The estimated pulmonary artery systolic pressure is   22 mmHg.    IVC/SVC: Elevated venous pressure at 15 mmHg.    Pericardium: There is a small circumferential effusion. No indication   of cardiac  tamponade.    Supine/ventilated, STAT ER echo     , EKG: Reviewed, and X-Ray: CXR: X-Ray Chest 1 View (CXR): No results found for this visit on 01/15/24. and X-Ray Chest PA and Lateral (CXR): No results found for this visit on 01/15/24.  Assessment and Plan:   Patient who presents s/p cardiac arrest. Remains critically ill. Increased ectopy/VT noted, amiodarone resumed. Continue abx/supportive care. Prognosis seems to be poor.    MRSA bacteremia  -Mgmt as per critical care  -On abx    Influenza B  -Mgmt as per critical care    HTN (hypertension)  Titrate meds    ESRD (end stage renal disease)  -Mgmt as per nephrology    Cardiac arrest  Hep gtt ordered  Ischemia eval once awake and pending neuro status  Cont ICU supportive care  EKG with LBBB  No further VT reported  Cont Amio gtt, statin    1/17/24  -Continue OMT-amio gtt, statin  -ASA 81 mg if able to tolerate  -Remains critically ill  -Ischemic evaluation pending stability/recovery    1/18/24  -No change in CV status  -Continue ASA  -Statin once LFT's normalize  -Ischemic evaluation pending stability/recovery although prognosis seems to be poor    1/19/24  -Increased ectopy this AM  -Amiodarone drip resumed/continue at 1 mg/min x 24 hours  -Continue BB  -Keep K > 4; Mg > 2  -Awaiting family members arrival, prognosis seems to be poor  -        VTE Risk Mitigation (From admission, onward)           Ordered     heparin (porcine) injection 5,000 Units  Every 8 hours         01/18/24 0916     IP VTE HIGH RISK PATIENT  Once         01/15/24 1748     Place sequential compression device  Until discontinued         01/15/24 1748                    Sherly Valenzuela PA-C  Cardiology  [facility] ([facility])

## 2024-01-19 NOTE — ASSESSMENT & PLAN NOTE
Hep gtt ordered  Ischemia eval once awake and pending neuro status  Cont ICU supportive care  EKG with LBBB  No further VT reported  Cont Amio gtt, statin    1/17/24  -Continue OMT-amio gtt, statin  -ASA 81 mg if able to tolerate  -Remains critically ill  -Ischemic evaluation pending stability/recovery    1/18/24  -No change in CV status  -Continue ASA  -Statin once LFT's normalize  -Ischemic evaluation pending stability/recovery although prognosis seems to be poor    1/19/24  -Increased ectopy this AM  -Amiodarone drip resumed/continue at 1 mg/min x 24 hours  -Continue BB  -Keep K > 4; Mg > 2  -Awaiting family members arrival, prognosis seems to be poor  -

## 2024-01-19 NOTE — SUBJECTIVE & OBJECTIVE
Interval History:   73 year old woman -now intubated   She is having EEG done .  Blood culture- 01/17- no growth    Review of Systems   Reason unable to perform ROS: intubated /sedated.     Objective:     Vital Signs (Most Recent):  Temp: 98.7 °F (37.1 °C) (01/19/24 0400)  Pulse: 68 (01/19/24 0630)  Resp: (!) 25 (01/19/24 0630)  BP: (!) 152/65 (01/19/24 0630)  SpO2: 97 % (01/19/24 0630) Vital Signs (24h Range):  Temp:  [98 °F (36.7 °C)-99.2 °F (37.3 °C)] 98.7 °F (37.1 °C)  Pulse:  [] 68  Resp:  [19-35] 25  SpO2:  [93 %-100 %] 97 %  BP: (124-197)/(55-91) 152/65     Weight: 108.9 kg (240 lb 1.3 oz)  Body mass index is 37.6 kg/m².    Estimated Creatinine Clearance: 14.3 mL/min (A) (based on SCr of 4.6 mg/dL (H)).     Physical Exam  Vitals and nursing note reviewed.   Constitutional:       Comments: Patient is intubated   Abdominal:      General: Abdomen is flat.   Musculoskeletal:      Comments: See wound care notes   Neurological:      Comments: Patient is intubated          Significant Labs: Blood Culture:   Recent Labs   Lab 01/15/24  1308 01/15/24  1309 01/17/24  0909   LABBLOO Gram stain aer bottle: Gram positive cocci in clusters resembling Staph  Results called to and read back by: Gladis Ricci RN 01/16/2024  08:16  Gram stain olegario bottle: Gram positive cocci in clusters resembling Staph  Positive results previously called 01/16/2024  10:29  METHICILLIN RESISTANT STAPHYLOCOCCUS AUREUS  [place] [facility] [place].  For susceptibility see order #[id number]  * Gram stain aer bottle: Gram positive cocci in clusters resembling Staph  Results called to and read back by: Gladis Ricci RN 01/16/2024  08:16  Gram stain olegario bottle: Gram positive cocci in clusters resembling Staph  Positive results previously called 01/16/2024  10:29  METHICILLIN RESISTANT STAPHYLOCOCCUS AUREUS  ID consult required at [facility].[place] and [place] locations.  * No Growth to date  No Growth  "to date  No Growth to date  No Growth to date     BMP:   Recent Labs   Lab 01/19/24  0325   *   *   K 3.9      CO2 20*   BUN 34*   CREATININE 4.6*   CALCIUM 8.8   MG 2.1     CBC:   Recent Labs   Lab 01/18/24  0419 01/19/24  0325   WBC 15.24*  15.24* 20.54*   HGB 9.8*  9.8* 10.9*   HCT 31.8*  31.8* 33.9*     337 370     CMP:   Recent Labs   Lab 01/18/24  0419 01/19/24  0156 01/19/24 0325   * 132* 133*   K 4.7 3.9 3.9    100 100   CO2 17* 20* 20*   * 461* 477*   BUN 34* 31* 34*   CREATININE 5.7* 4.5* 4.6*   CALCIUM 7.7* 8.7 8.8   PROT 6.3  --  7.4   ALBUMIN 2.2*  --  2.3*   BILITOT 0.2  --  0.3   ALKPHOS 127  --  145*   AST 51*  --  35   ALT 35  --  31   ANIONGAP 13 12 13     Wound Culture: No results for input(s): "LABAERO" in the last 4320 hours.  All pertinent labs within the past 24 hours have been reviewed.    Significant Imaging: I have reviewed all pertinent imaging results/findings within the past 24 hours.  "

## 2024-01-19 NOTE — SUBJECTIVE & OBJECTIVE
Review of Systems   Unable to perform ROS: Acuity of condition     Objective:     Vital Signs (Most Recent):  Temp: 98.2 °F (36.8 °C) (01/19/24 0715)  Pulse: 66 (01/19/24 0945)  Resp: (!) 22 (01/19/24 0945)  BP: (!) 145/66 (01/19/24 0945)  SpO2: 99 % (01/19/24 0945) Vital Signs (24h Range):  Temp:  [98 °F (36.7 °C)-99 °F (37.2 °C)] 98.2 °F (36.8 °C)  Pulse:  [] 66  Resp:  [21-35] 22  SpO2:  [93 %-100 %] 99 %  BP: (135-197)/(55-91) 145/66     Weight: 108.9 kg (240 lb 1.3 oz)  Body mass index is 37.6 kg/m².     SpO2: 99 %         Intake/Output Summary (Last 24 hours) at 1/19/2024 1154  Last data filed at 1/19/2024 1100  Gross per 24 hour   Intake 1787.87 ml   Output 3187 ml   Net -1399.13 ml       Lines/Drains/Airways       Central Venous Catheter Line  Duration                  Hemodialysis Catheter right subclavian -- days              Drain  Duration                  NG/OG Tube 01/15/24 1333 orogastric Center mouth 3 days         Urethral Catheter 01/15/24 1332 Latex;Double-lumen 16 Fr. 3 days              Airway  Duration                  Airway - Non-Surgical 01/15/24 Endotracheal Tube 4 days              Peripheral Intravenous Line  Duration                  Peripheral IV - Single Lumen 01/15/24 1302 18 G Left Antecubital 3 days         Peripheral IV - Single Lumen 01/15/24 1313 18 G Right Antecubital 3 days         Peripheral IV - Single Lumen 01/19/24 0115 20 G Anterior;Left Forearm <1 day                       Physical Exam  Vitals and nursing note reviewed.            Significant Labs: CMP   Recent Labs   Lab 01/18/24  0419 01/19/24  0156 01/19/24  0325   * 132* 133*   K 4.7 3.9 3.9    100 100   CO2 17* 20* 20*   * 461* 477*   BUN 34* 31* 34*   CREATININE 5.7* 4.5* 4.6*   CALCIUM 7.7* 8.7 8.8   PROT 6.3  --  7.4   ALBUMIN 2.2*  --  2.3*   BILITOT 0.2  --  0.3   ALKPHOS 127  --  145*   AST 51*  --  35   ALT 35  --  31   ANIONGAP 13 12 13   , CBC   Recent Labs   Lab 01/18/24  0410  "01/19/24  0325   WBC 15.24*  15.24* 20.54*   HGB 9.8*  9.8* 10.9*   HCT 31.8*  31.8* 33.9*     337 370   , Troponin No results for input(s): "TROPONINI" in the last 48 hours., and All pertinent lab results from the last 24 hours have been reviewed.    Significant Imaging: Echocardiogram: Transthoracic echo (TTE) complete (Cupid Only):   Results for orders placed or performed during the hospital encounter of 01/15/24   Echo   Result Value Ref Range    BSA 2.32 m2    LVOT stroke volume 88.23 cm3    LVIDd 4.33 3.5 - 6.0 cm    LV Systolic Volume 52.79 mL    LV Systolic Volume Index 23.8 mL/m2    LVIDs 3.55 2.1 - 4.0 cm    LV Diastolic Volume 84.55 mL    LV Diastolic Volume Index 38.09 mL/m2    IVS 1.46 (A) 0.6 - 1.1 cm    LVOT diameter 2.00 cm    LVOT area 3.1 cm2    FS 18 (A) 28 - 44 %    Left Ventricle Relative Wall Thickness 0.85 cm    Posterior Wall 1.83 (A) 0.6 - 1.1 cm    LV mass 301.17 g    LV Mass Index 136 g/m2    MV Peak E Cody 0.95 m/s    TDI LATERAL 0.04 m/s    MV Peak A Cody 1.22 m/s    TR Max Cody 1.29 m/s    E/A ratio 0.78     IVRT 102.76 msec    E wave deceleration time 249.44 msec    LV LATERAL E/E' RATIO 23.75 m/s    LVOT peak cody 1.26 m/s    Left Ventricular Outflow Tract Mean Velocity 0.88 cm/s    Left Ventricular Outflow Tract Mean Gradient 3.42 mmHg    RVDD 3.54 cm    RV S' 12.74 cm/s    RVOT peak VTI 16.9 cm    TAPSE 1.92 cm    LA size 3.69 cm    Left Atrium Minor Axis 6.80 cm    Left Atrium Major Axis 6.39 cm    RA Major Axis 6.30 cm    AV regurgitation pressure 1/2 time [id number] ms    AR Max Cody 4.43 m/s    AV mean gradient 11 mmHg    AV peak gradient 20 mmHg    Ao peak cody 2.22 m/s    Ao VTI 54.00 cm    LVOT peak VTI 28.10 cm    AV valve area 1.63 cm²    AV Velocity Ratio 0.57     AV index (prosthetic) 0.52     DB by Velocity Ratio 1.78 cm²    MV stenosis pressure 1/2 time 72.34 ms    MV valve area p 1/2 method 3.04 cm2    Triscuspid Valve Regurgitation Peak Gradient 7 mmHg "    PV mean gradient 1 mmHg    PV PEAK VELOCITY 1.14 m/s    PV peak gradient 5 mmHg    Pulmonary Valve Mean Velocity 0.67 m/s    RVOT peak philippe 0.82 m/s    Ao root annulus 2.91 cm    STJ 2.51 cm    Ascending aorta 2.68 cm    IVC diameter 2.66 cm    ZLVIDS -2.23     ZLVIDD -5.83     LA Volume Index 36.3 mL/m2    LA volume 80.59 cm3    LA WIDTH 3.9 cm    RA Width 2.9 cm    TV resting pulmonary artery pressure 22 mmHg    RV TB RVSP 16 mmHg    Est. RA pres 15 mmHg    Narrative      Left Ventricle: The left ventricle is normal in size. Normal wall   thickness. There is concentric hypertrophy. Mild global hypokinesis   present. There is mildly reduced systolic function with a visually   estimated ejection fraction of 45 - 50%. There is diastolic dysfunction   but grade cannot be determined. Elevated left ventricular filling   pressure.    Right Ventricle: Normal right ventricular cavity size. Wall thickness   is normal. Right ventricle wall motion  is normal. Systolic function is   borderline low.    Left Atrium: Left atrium is mildly dilated.    Aortic Valve: Mildly calcified cusps. There is mild annular dilation   present. There is mild stenosis. Aortic valve area by VTI is 1.63 cm².   Aortic valve peak velocity is 2.22 m/s. Mean gradient is 11 mmHg. The   dimensionless index is 0.52. There is moderate aortic regurgitation.    Mitral Valve: There is moderate mitral annular calcification present.   There is mild regurgitation.    Tricuspid Valve: There is mild regurgitation.    Pulmonary Artery: The estimated pulmonary artery systolic pressure is   22 mmHg.    IVC/SVC: Elevated venous pressure at 15 mmHg.    Pericardium: There is a small circumferential effusion. No indication   of cardiac tamponade.    Supine/ventilated, STAT ER echo     , EKG: Reviewed, and X-Ray: CXR: X-Ray Chest 1 View (CXR): No results found for this visit on 01/15/24. and X-Ray Chest PA and Lateral (CXR): No results found for this visit on  01/15/24.

## 2024-01-19 NOTE — PROGRESS NOTES
[facility] - Intensive [facility])  Infectious Disease  Progress Note    Patient Name: Sydney Lejeune  MRN: [id number]  Admission Date: 1/15/2024  Length of Stay: 4 days  Attending Physician: Dvaid Barrios MD  Primary Care Provider: No, Primary Doctor    Isolation Status: Contact and Droplet  Assessment/Plan:      Cardiac/Vascular  Cardiac arrest  Follow cardiology     Renal/  ESRD (end stage renal disease)  HD as per nephrology    ID  MRSA bacteremia  Source control is needed    Echo done- 1/15- no veg  Will follow repeat blood cultures    1/18- will continue Vancomycin, follow repeat blood culture     Influenza B  On Tamiflu, continue supportive care    Chronic osteomyelitis  Will need foot imaging when more stable -  Will add empiric Meropenem-  Wound cultures done 12/23- Proteus was Rocephin resistant -will add meropenem  On Vanco for mRSA   Will do local wound cultures     1/18- will continue Meropenem /vanco-follow local cultures    Endocrine  Type 2 diabetes mellitus with kidney complication, with long-term current use of insulin  Insulin regime as per primary team        Anticipated Disposition:     Thank you for your consult. I will follow-up with patient. Please contact us if you have any additional questions.    Cuauhtemoc Crowder MD, Formerly [facility], [facility]  Infectious Disease  [facility] - Intensive [place] [facility] ([facility])    Subjective:     Principal Problem:<principal problem not specified>    HPI:  72 y/o WF with ESRD, DM, HTN, and chronic left foot wound with osteo presents.  She was found after she was found slumped over her steering wheel at a gas station.  She is now intubated .  She had wound debridement -1/11/24 at [facility] -location - Location: left plantar distal ulcer   Debridement type:  excisional   Ct chest /abd/pelvis- 01/15/  1. There are several pockets of gas in the right humeral head.  Osteomyelitis cannot be excluded.  2. Lymphadenopathy  3. Tiny bilateral pleural effusions  4. There are healing and/or healed  fractures in the anterior aspect of the thoracic cage bilaterally.  Blood culture- MRSA-01/15    01/15- Influenza A  Urine culture- 01/15- MRSa  12/26/23-  omponent 3 wk ago Comments   Aerobic Culture  Abnormal   Staphylococcus aureus  Moderate growth Methicillin resistant (MRSA)   Aerobic Culture  Abnormal   Proteus mirabilis  Scant growth      Interval History:   73 year old woman -now intubated   She is having EEG done .  Blood culture- 01/17- no growth    Review of Systems   Reason unable to perform ROS: intubated /sedated.     Objective:     Vital Signs (Most Recent):  Temp: 98.7 °F (37.1 °C) (01/19/24 0400)  Pulse: 68 (01/19/24 0630)  Resp: (!) 25 (01/19/24 0630)  BP: (!) 152/65 (01/19/24 0630)  SpO2: 97 % (01/19/24 0630) Vital Signs (24h Range):  Temp:  [98 °F (36.7 °C)-99.2 °F (37.3 °C)] 98.7 °F (37.1 °C)  Pulse:  [] 68  Resp:  [19-35] 25  SpO2:  [93 %-100 %] 97 %  BP: (124-197)/(55-91) 152/65     Weight: 108.9 kg (240 lb 1.3 oz)  Body mass index is 37.6 kg/m².    Estimated Creatinine Clearance: 14.3 mL/min (A) (based on SCr of 4.6 mg/dL (H)).     Physical Exam  Vitals and nursing note reviewed.   Constitutional:       Comments: Patient is intubated   Abdominal:      General: Abdomen is flat.   Musculoskeletal:      Comments: See wound care notes   Neurological:      Comments: Patient is intubated          Significant Labs: Blood Culture:   Recent Labs   Lab 01/15/24  1308 01/15/24  1309 01/17/24  0909   LABBLOO Gram stain aer bottle: Gram positive cocci in clusters resembling Staph  Results called to and read back by: Gladis Ricci RN 01/16/2024  08:16  Gram stain olegario bottle: Gram positive cocci in clusters resembling Staph  Positive results previously called 01/16/2024  10:29  METHICILLIN RESISTANT STAPHYLOCOCCUS AUREUS  ID consult required at [facility] and [facility].  For susceptibility see order #[id number]  * Gram stain aer bottle: Gram positive cocci in clusters resembling  "Staph  Results called to and read back by: Gladis Ricci RN 01/16/2024  08:16  Gram stain olegario bottle: Gram positive cocci in clusters resembling Staph  Positive results previously called 01/16/2024  10:29  METHICILLIN RESISTANT STAPHYLOCOCCUS AUREUS  ID consult required at [facility] and [facility] locations.  * No Growth to date  No Growth to date  No Growth to date  No Growth to date     BMP:   Recent Labs   Lab 01/19/24  0325   *   *   K 3.9      CO2 20*   BUN 34*   CREATININE 4.6*   CALCIUM 8.8   MG 2.1     CBC:   Recent Labs   Lab 01/18/24  0419 01/19/24  0325   WBC 15.24*  15.24* 20.54*   HGB 9.8*  9.8* 10.9*   HCT 31.8*  31.8* 33.9*     337 370     CMP:   Recent Labs   Lab 01/18/24  0419 01/19/24  0156 01/19/24  0325   * 132* 133*   K 4.7 3.9 3.9    100 100   CO2 17* 20* 20*   * 461* 477*   BUN 34* 31* 34*   CREATININE 5.7* 4.5* 4.6*   CALCIUM 7.7* 8.7 8.8   PROT 6.3  --  7.4   ALBUMIN 2.2*  --  2.3*   BILITOT 0.2  --  0.3   ALKPHOS 127  --  145*   AST 51*  --  35   ALT 35  --  31   ANIONGAP 13 12 13     Wound Culture: No results for input(s): "LABAERO" in the last 4320 hours.  All pertinent labs within the past 24 hours have been reviewed.    Significant Imaging: I have reviewed all pertinent imaging results/findings within the past 24 hours.  "

## 2024-01-19 NOTE — ASSESSMENT & PLAN NOTE
- Urine and Blood from 1/15 positive for MRSA  - repeat cultures 1/17 NGTD  - continue Vanc, empiric Merrem  - consulted ID  - hemodynamics stable

## 2024-01-19 NOTE — ASSESSMENT & PLAN NOTE
- WOCN consulted  - best I can tell, not on any chronic Abx for this.  It seems she gets spot treated here and there and follows with outpt Wound Care regularly  - blood Cx 1/15 now with MRSA  - getting broad spectrum Abx  - repeat Cx pending  - ID following

## 2024-01-19 NOTE — TREATMENT PLAN
Patient having multiple runs of V-tach.  BP remains stable.  IV Amiodarone bolus and drip ordered, with improvement in rhythm.  STAT labs ordered to check electrolytes.

## 2024-01-19 NOTE — NURSING
Pt. GCS remains a 4. Pt. Is not alert or responsive. Intubated. Vent mode switched to Assist Control over night. Pt. Had multiple runs of VTach overnight , restarted on Amio gtt. STAT Labs ordered. Pt responded well to this but continues to have briefs runs occasionally. Cardiology was notified, Dr. Licea. Blood Glucose increased into the 400s. Provider made aware, please see adjustments made. Shift total: 135 ml of clear yellow urine to Reis. No BM.

## 2024-01-19 NOTE — PROGRESS NOTES
[facility] - Intensive Care ([facility])  Critical Care Medicine  Progress Note    Patient Name: Sydney Lejeune  MRN: [id number]  Admission Date: 1/15/2024  Hospital Length of Stay: 4 days  Code Status: Full Code  Attending Provider: David Barrios MD  Primary Care Provider: No, Primary Doctor   Principal Problem: <principal problem not specified>    Subjective:     HPI:  Ms Lejeune is a 72 y/o WF with ESRD, DM, HTN, and chronic left foot wound with osteo presents via EMS to the ER today after being found slumped over her steering wheel at a gas station.  History is taken from the medical record as the patient is unable to provide history and no family available at the time of my exam.  On EMS arrival, she was pulseless and apneic.  ROSC achieved following 6 rounds of CPR (Epi x 1 and 300mg of Amio).  She was intubated in the ER upon arrival.  Hemodynamics stable and low/mod vent requirements but ongoign short runs of NSVT.  Cardiology consulted in the ER.  Currently on amio gtt and propofol.  Initial CT Head unremarkable.  Labs notable for Lactic of 8, WBC 23.89, Hgb 9.8, K 3.6, BNP 1466, Trop 0.043, positive Flu B.  ABG shows metabolic acidosis with good respiratory compensation and pO2 505.  She is admitted to the ICU for post-arrest care.    Hospital/ICU Course:  1/15 - admitted to ICU following out of hospital cardiac arrest.  Minimal vent and hemodynamics stable.  1/16 - No acute events.  Rhythm stable.  Vent and hemodynamics stable.  Blood Cx GS with GPC in clusters.  1/17 - No acute events.  Vent and hemodynamics stable.  Neuro exam largely unchanged.  1/18 - No acute events.  Vent and hemodynamics stable.  Neuro exam largely unchanged.    Interval History: Some increasing frequency of short runs of VT overnight and she was started back on the amiodarone drip.  Hemodynamics and vent are stable.  Neuro exam largely unchanged.      Objective:     Vital Signs (Most Recent):  Temp: 98.2 °F (36.8 °C) (01/19/24  0715)  Pulse: 66 (01/19/24 0945)  Resp: (!) 22 (01/19/24 0945)  BP: (!) 145/66 (01/19/24 0945)  SpO2: 99 % (01/19/24 0945) Vital Signs (24h Range):  Temp:  [98 °F (36.7 °C)-99 °F (37.2 °C)] 98.2 °F (36.8 °C)  Pulse:  [] 66  Resp:  [21-35] 22  SpO2:  [93 %-100 %] 99 %  BP: (135-197)/(55-91) 145/66     Weight: 108.9 kg (240 lb 1.3 oz)  Body mass index is 37.6 kg/m².      Intake/Output Summary (Last 24 hours) at 1/19/2024 1141  Last data filed at 1/19/2024 1100  Gross per 24 hour   Intake 1787.87 ml   Output 3187 ml   Net -1399.13 ml        Physical Exam  Vitals and nursing note reviewed.   Constitutional:       General: She is not in acute distress.     Comments: Intubated, no sedation   Cardiovascular:      Rate and Rhythm: Normal rate and regular rhythm.      Pulses: Normal pulses.      Heart sounds: No murmur heard.  Pulmonary:      Effort: Pulmonary effort is normal. No respiratory distress.      Breath sounds: No wheezing, rhonchi or rales.   Abdominal:      General: Abdomen is flat. There is no distension.      Palpations: Abdomen is soft.      Tenderness: There is no abdominal tenderness.   Musculoskeletal:      Right lower leg: No edema.      Left lower leg: Edema present.   Neurological:      Comments: No cough for me, intact pupillary response, spontaneous respirations, no withdrawal to pain           Review of Systems    Vents:  Vent Mode: A/C (01/19/24 0855)  Set Rate: 16 BPM (01/19/24 0855)  Vt Set: 450 mL (01/19/24 0855)  Pressure Support: 7 cmH20 (01/18/24 1923)  PEEP/CPAP: 5 cmH20 (01/19/24 0855)  Oxygen Concentration (%): 30 (01/19/24 1101)  Peak Airway Pressure: 29 cmH20 (01/19/24 0855)  Plateau Pressure: 18 cmH20 (01/19/24 0855)  Total Ve: 10.5 L/m (01/19/24 0855)  Negative Inspiratory Force (cm H2O): 0 (01/19/24 0855)  F/VT Ratio<105 (RSBI): (!) 52.63 (01/19/24 0855)    Lines/Drains/Airways       Central Venous Catheter Line  Duration                  Hemodialysis Catheter right subclavian --  days              Drain  Duration                  NG/OG Tube 01/15/24 1333 orogastric Center mouth 3 days         Urethral Catheter 01/15/24 1332 Latex;Double-lumen 16 Fr. 3 days              Airway  Duration                  Airway - Non-Surgical 01/15/24 Endotracheal Tube 4 days              Peripheral Intravenous Line  Duration                  Peripheral IV - Single Lumen 01/15/24 1302 18 G Left Antecubital 3 days         Peripheral IV - Single Lumen 01/15/24 1313 18 G Right Antecubital 3 days         Peripheral IV - Single Lumen 01/19/24 0115 20 G Anterior;Left Forearm <1 day                    Significant Labs:    CBC/Anemia Profile:  Recent Labs   Lab 01/18/24 0419 01/19/24  0325   WBC 15.24*  15.24* 20.54*   HGB 9.8*  9.8* 10.9*   HCT 31.8*  31.8* 33.9*     337 370   MCV 95  95 93   RDW 14.6*  14.6* 14.7*        Chemistries:  Recent Labs   Lab 01/18/24 0419 01/19/24  0156 01/19/24  0325   * 132* 133*   K 4.7 3.9 3.9    100 100   CO2 17* 20* 20*   BUN 34* 31* 34*   CREATININE 5.7* 4.5* 4.6*   CALCIUM 7.7* 8.7 8.8   ALBUMIN 2.2*  --  2.3*   PROT 6.3  --  7.4   BILITOT 0.2  --  0.3   ALKPHOS 127  --  145*   ALT 35  --  31   AST 51*  --  35   MG 2.2 2.1 2.1   PHOS 5.1*  --  3.4       All pertinent labs within the past 24 hours have been reviewed.    Significant Imaging:  I have reviewed all pertinent imaging results/findings within the past 24 hours.    ABG  Recent Labs   Lab 01/19/24  0401   PH 7.480*   PO2 85   PCO2 27.4*   HCO3 20.4*   BE -3*     Assessment/Plan:     Cardiac/Vascular  HTN (hypertension)  - holding home meds acutely  - will restart, as necessary    Cardiac arrest  - initially thought Cardiac most likely given runs of NSVT and good oxgenation, but now blood cultures are showing Staph (though no evidence of true septic shock given stable hemodynamics)  - empiric Abx  - Cardiology following  - back on the amio gtt   - replete electrolytes cautiously given ESRD  - Echo  with mildly decreased LVEF  - Cardiac monitoring  - avoid fever  - initial CT Head unremarkable  - EEG with severe diffuse encephalopathy    Renal/  ESRD (end stage renal disease)  - MWF via left chest vascath  - no acute indication  - Nephrology consulted  - tolerated dialysis 1/18  - renally dose meds and avoid nephrotoxins    ID  MRSA bacteremia  - Urine and Blood from 1/15 positive for MRSA  - repeat cultures 1/17 NGTD  - continue Vanc, empiric Merrem  - consulted ID  - hemodynamics stable    Influenza B  - likely an incidental finding given her excellent oxygenation  - will hold off on Tamiflu given ESRD  - Droplet precautions  - monitor    Chronic osteomyelitis  - WOCN consulted  - best I can tell, not on any chronic Abx for this.  It seems she gets spot treated here and there and follows with outpt Wound Care regularly  - blood Cx 1/15 now with MRSA  - getting broad spectrum Abx  - repeat Cx pending  - ID following    Endocrine  Type 2 diabetes mellitus with kidney complication, with long-term current use of insulin  - SSI/Accuchecks      Critical Care Time: 32 minutes  Critical secondary to respiratory failure, cardiac arrest      Critical care was time spent personally by me on the following activities: development of treatment plan with patient or surrogate and bedside caregivers, discussions with consultants, evaluation of patient's response to treatment, examination of patient, ordering and performing treatments and interventions, ordering and review of laboratory studies, ordering and review of radiographic studies, pulse oximetry, re-evaluation of patient's condition. This critical care time did not overlap with that of any other provider or involve time for any procedures.     David Barrios MD  Critical Care Medicine  '[place] - Intensive Care ([facility])

## 2024-01-20 LAB
ALBUMIN SERPL BCP-MCNC: 2.2 G/DL (ref 3.5–5.2)
ALP SERPL-CCNC: 130 U/L (ref 55–135)
ALT SERPL W/O P-5'-P-CCNC: 24 U/L (ref 10–44)
ANION GAP SERPL CALC-SCNC: 12 MMOL/L (ref 8–16)
AST SERPL-CCNC: 35 U/L (ref 10–40)
BASOPHILS # BLD AUTO: 0.04 K/UL (ref 0–0.2)
BASOPHILS NFR BLD: 0.2 % (ref 0–1.9)
BILIRUB SERPL-MCNC: 0.3 MG/DL (ref 0.1–1)
BUN SERPL-MCNC: 60 MG/DL (ref 8–23)
CALCIUM SERPL-MCNC: 8.9 MG/DL (ref 8.7–10.5)
CHLORIDE SERPL-SCNC: 102 MMOL/L (ref 95–110)
CO2 SERPL-SCNC: 21 MMOL/L (ref 23–29)
CREAT SERPL-MCNC: 5.7 MG/DL (ref 0.5–1.4)
DIFFERENTIAL METHOD BLD: ABNORMAL
EOSINOPHIL # BLD AUTO: 0.2 K/UL (ref 0–0.5)
EOSINOPHIL NFR BLD: 0.8 % (ref 0–8)
ERYTHROCYTE [DISTWIDTH] IN BLOOD BY AUTOMATED COUNT: 15.2 % (ref 11.5–14.5)
EST. GFR  (NO RACE VARIABLE): 7 ML/MIN/1.73 M^2
GLUCOSE SERPL-MCNC: 173 MG/DL (ref 70–110)
HCT VFR BLD AUTO: 32.2 % (ref 37–48.5)
HGB BLD-MCNC: 10.2 G/DL (ref 12–16)
IMM GRANULOCYTES # BLD AUTO: 0.18 K/UL (ref 0–0.04)
IMM GRANULOCYTES NFR BLD AUTO: 0.9 % (ref 0–0.5)
LYMPHOCYTES # BLD AUTO: 1.3 K/UL (ref 1–4.8)
LYMPHOCYTES NFR BLD: 6.3 % (ref 18–48)
MAGNESIUM SERPL-MCNC: 2.3 MG/DL (ref 1.6–2.6)
MCH RBC QN AUTO: 29.3 PG (ref 27–31)
MCHC RBC AUTO-ENTMCNC: 31.7 G/DL (ref 32–36)
MCV RBC AUTO: 93 FL (ref 82–98)
MONOCYTES # BLD AUTO: 1.7 K/UL (ref 0.3–1)
MONOCYTES NFR BLD: 8.1 % (ref 4–15)
NEUTROPHILS # BLD AUTO: 17.7 K/UL (ref 1.8–7.7)
NEUTROPHILS NFR BLD: 83.7 % (ref 38–73)
NRBC BLD-RTO: 0 /100 WBC
PHOSPHATE SERPL-MCNC: 3.9 MG/DL (ref 2.7–4.5)
PLATELET # BLD AUTO: 348 K/UL (ref 150–450)
PMV BLD AUTO: 9.5 FL (ref 9.2–12.9)
POCT GLUCOSE: 139 MG/DL (ref 70–110)
POCT GLUCOSE: 146 MG/DL (ref 70–110)
POCT GLUCOSE: 157 MG/DL (ref 70–110)
POCT GLUCOSE: 158 MG/DL (ref 70–110)
POCT GLUCOSE: 164 MG/DL (ref 70–110)
POCT GLUCOSE: 170 MG/DL (ref 70–110)
POCT GLUCOSE: 204 MG/DL (ref 70–110)
POTASSIUM SERPL-SCNC: 3.5 MMOL/L (ref 3.5–5.1)
PROT SERPL-MCNC: 7.2 G/DL (ref 6–8.4)
RBC # BLD AUTO: 3.48 M/UL (ref 4–5.4)
SODIUM SERPL-SCNC: 135 MMOL/L (ref 136–145)
VANCOMYCIN SERPL-MCNC: 17.2 UG/ML
WBC # BLD AUTO: 21.08 K/UL (ref 3.9–12.7)

## 2024-01-20 PROCEDURE — 80202 ASSAY OF VANCOMYCIN: CPT | Performed by: INTERNAL MEDICINE

## 2024-01-20 PROCEDURE — 25000003 PHARM REV CODE 250: Performed by: NURSE PRACTITIONER

## 2024-01-20 PROCEDURE — 25000003 PHARM REV CODE 250: Performed by: PHYSICIAN ASSISTANT

## 2024-01-20 PROCEDURE — 25000003 PHARM REV CODE 250: Performed by: INTERNAL MEDICINE

## 2024-01-20 PROCEDURE — 84100 ASSAY OF PHOSPHORUS: CPT | Performed by: INTERNAL MEDICINE

## 2024-01-20 PROCEDURE — 36415 COLL VENOUS BLD VENIPUNCTURE: CPT | Performed by: INTERNAL MEDICINE

## 2024-01-20 PROCEDURE — 93005 ELECTROCARDIOGRAM TRACING: CPT

## 2024-01-20 PROCEDURE — 63600175 PHARM REV CODE 636 W HCPCS: Performed by: PHYSICIAN ASSISTANT

## 2024-01-20 PROCEDURE — 93010 ELECTROCARDIOGRAM REPORT: CPT | Mod: ,,, | Performed by: INTERNAL MEDICINE

## 2024-01-20 PROCEDURE — 85025 COMPLETE CBC W/AUTO DIFF WBC: CPT | Performed by: INTERNAL MEDICINE

## 2024-01-20 PROCEDURE — 27000207 HC ISOLATION

## 2024-01-20 PROCEDURE — 27200966 HC CLOSED SUCTION SYSTEM

## 2024-01-20 PROCEDURE — 63600175 PHARM REV CODE 636 W HCPCS: Performed by: INTERNAL MEDICINE

## 2024-01-20 PROCEDURE — 94003 VENT MGMT INPAT SUBQ DAY: CPT

## 2024-01-20 PROCEDURE — 99231 SBSQ HOSP IP/OBS SF/LOW 25: CPT | Mod: ,,, | Performed by: INTERNAL MEDICINE

## 2024-01-20 PROCEDURE — 99900026 HC AIRWAY MAINTENANCE (STAT)

## 2024-01-20 PROCEDURE — 94761 N-INVAS EAR/PLS OXIMETRY MLT: CPT

## 2024-01-20 PROCEDURE — 80053 COMPREHEN METABOLIC PANEL: CPT | Performed by: INTERNAL MEDICINE

## 2024-01-20 PROCEDURE — 83735 ASSAY OF MAGNESIUM: CPT | Performed by: INTERNAL MEDICINE

## 2024-01-20 PROCEDURE — 27100171 HC OXYGEN HIGH FLOW UP TO 24 HOURS

## 2024-01-20 PROCEDURE — 99900035 HC TECH TIME PER 15 MIN (STAT)

## 2024-01-20 PROCEDURE — 20000000 HC ICU ROOM

## 2024-01-20 RX ADMIN — HEPARIN SODIUM 5000 UNITS: 5000 INJECTION INTRAVENOUS; SUBCUTANEOUS at 01:01

## 2024-01-20 RX ADMIN — INSULIN ASPART 3 UNITS: 100 INJECTION, SOLUTION INTRAVENOUS; SUBCUTANEOUS at 11:01

## 2024-01-20 RX ADMIN — INSULIN ASPART 3 UNITS: 100 INJECTION, SOLUTION INTRAVENOUS; SUBCUTANEOUS at 09:01

## 2024-01-20 RX ADMIN — AMIODARONE HYDROCHLORIDE 1 MG/MIN: 1.8 INJECTION, SOLUTION INTRAVENOUS at 01:01

## 2024-01-20 RX ADMIN — METOPROLOL TARTRATE 50 MG: 50 TABLET, FILM COATED ORAL at 09:01

## 2024-01-20 RX ADMIN — VANCOMYCIN HYDROCHLORIDE 500 MG: 500 INJECTION, POWDER, LYOPHILIZED, FOR SOLUTION INTRAVENOUS at 06:01

## 2024-01-20 RX ADMIN — HEPARIN SODIUM 5000 UNITS: 5000 INJECTION INTRAVENOUS; SUBCUTANEOUS at 05:01

## 2024-01-20 RX ADMIN — FAMOTIDINE 20 MG: 40 POWDER, FOR SUSPENSION ORAL at 09:01

## 2024-01-20 RX ADMIN — MEROPENEM 500 MG: 500 INJECTION INTRAVENOUS at 09:01

## 2024-01-20 RX ADMIN — INSULIN ASPART 6 UNITS: 100 INJECTION, SOLUTION INTRAVENOUS; SUBCUTANEOUS at 05:01

## 2024-01-20 RX ADMIN — AMIODARONE HYDROCHLORIDE 0.5 MG/MIN: 1.8 INJECTION, SOLUTION INTRAVENOUS at 10:01

## 2024-01-20 RX ADMIN — POLYETHYLENE GLYCOL 3350 17 G: 17 POWDER, FOR SOLUTION ORAL at 09:01

## 2024-01-20 RX ADMIN — SENNOSIDES AND DOCUSATE SODIUM 2 TABLET: 8.6; 5 TABLET ORAL at 09:01

## 2024-01-20 RX ADMIN — INSULIN ASPART 2 UNITS: 100 INJECTION, SOLUTION INTRAVENOUS; SUBCUTANEOUS at 09:01

## 2024-01-20 RX ADMIN — CHLORHEXIDINE GLUCONATE 0.12% ORAL RINSE 15 ML: 1.2 LIQUID ORAL at 08:01

## 2024-01-20 RX ADMIN — PRAVASTATIN SODIUM 40 MG: 20 TABLET ORAL at 09:01

## 2024-01-20 RX ADMIN — CHLORHEXIDINE GLUCONATE 0.12% ORAL RINSE 15 ML: 1.2 LIQUID ORAL at 09:01

## 2024-01-20 RX ADMIN — AMIODARONE HYDROCHLORIDE 0.5 MG/MIN: 1.8 INJECTION, SOLUTION INTRAVENOUS at 05:01

## 2024-01-20 RX ADMIN — MUPIROCIN: 20 OINTMENT TOPICAL at 09:01

## 2024-01-20 RX ADMIN — ASPIRIN 81 MG CHEWABLE TABLET 81 MG: 81 TABLET CHEWABLE at 09:01

## 2024-01-20 RX ADMIN — AMIODARONE HYDROCHLORIDE 1 MG/MIN: 1.8 INJECTION, SOLUTION INTRAVENOUS at 07:01

## 2024-01-20 RX ADMIN — INSULIN ASPART 3 UNITS: 100 INJECTION, SOLUTION INTRAVENOUS; SUBCUTANEOUS at 04:01

## 2024-01-20 RX ADMIN — HEPARIN SODIUM 5000 UNITS: 5000 INJECTION INTRAVENOUS; SUBCUTANEOUS at 09:01

## 2024-01-20 NOTE — PROGRESS NOTES
[facility] - Intensive Care ([facility])  Critical Care Medicine  Progress Note    Patient Name: Sydney Lejeune  MRN: [id number]  Admission Date: 1/15/2024  Hospital Length of Stay: 5 days  Code Status: Full Code  Attending Provider: David Barrios MD  Primary Care Provider: No, Primary Doctor   Principal Problem: <principal problem not specified>    Subjective:     HPI:  Ms Lejeune is a 74 y/o WF with ESRD, DM, HTN, and chronic left foot wound with osteo presents via EMS to the ER today after being found slumped over her steering wheel at a gas station.  History is taken from the medical record as the patient is unable to provide history and no family available at the time of my exam.  On EMS arrival, she was pulseless and apneic.  ROSC achieved following 6 rounds of CPR (Epi x 1 and 300mg of Amio).  She was intubated in the ER upon arrival.  Hemodynamics stable and low/mod vent requirements but ongoign short runs of NSVT.  Cardiology consulted in the ER.  Currently on amio gtt and propofol.  Initial CT Head unremarkable.  Labs notable for Lactic of 8, WBC 23.89, Hgb 9.8, K 3.6, BNP 1466, Trop 0.043, positive Flu B.  ABG shows metabolic acidosis with good respiratory compensation and pO2 505.  She is admitted to the ICU for post-arrest care.    Hospital/ICU Course:  1/15 - admitted to ICU following out of hospital cardiac arrest.  Minimal vent and hemodynamics stable.  1/16 - No acute events.  Rhythm stable.  Vent and hemodynamics stable.  Blood Cx GS with GPC in clusters.  1/17 - No acute events.  Vent and hemodynamics stable.  Neuro exam largely unchanged.  1/18 - No acute events.  Vent and hemodynamics stable.  Neuro exam largely unchanged.  1/19 - No significant changes.  EEG showed severe diffuse encephalopathy.  1/20 - No significant changes.  Reports of some more L sided movement overnight, but nothing for me.    Interval History: No acute events.  Nursing reports some movement to the LUE overnight.  Nothing for  me on exam this morning.  Opening eyes spontaneously.  Good respiratory effort.        Objective:     Vital Signs (Most Recent):  Temp: 98 °F (36.7 °C) (01/20/24 0323)  Pulse: 62 (01/20/24 0800)  Resp: 18 (01/20/24 0800)  BP: (!) 139/102 (01/20/24 0600)  SpO2: 100 % (01/20/24 0800) Vital Signs (24h Range):  Temp:  [97.6 °F (36.4 °C)-98.9 °F (37.2 °C)] 98 °F (36.7 °C)  Pulse:  [58-71] 62  Resp:  [17-33] 18  SpO2:  [94 %-100 %] 100 %  BP: (111-163)/() 139/102     Weight: 108.9 kg (240 lb 1.3 oz)  Body mass index is 37.6 kg/m².      Intake/Output Summary (Last 24 hours) at 1/20/2024 0820  Last data filed at 1/20/2024 0600  Gross per 24 hour   Intake 1197.08 ml   Output 105 ml   Net 1092.08 ml        Physical Exam  Vitals and nursing note reviewed.   Constitutional:       Comments: Intubated, no sedation   Cardiovascular:      Rate and Rhythm: Normal rate and regular rhythm.      Pulses: Normal pulses.      Heart sounds: No murmur heard.  Pulmonary:      Effort: Pulmonary effort is normal. No respiratory distress.      Breath sounds: No wheezing, rhonchi or rales.   Abdominal:      General: Abdomen is flat. There is no distension.      Palpations: Abdomen is soft.      Tenderness: There is no abdominal tenderness.   Musculoskeletal:      Right lower leg: Edema present.      Left lower leg: Edema present.   Neurological:      Comments: Opens eyes to pain, no withdrawal, no cough, intact pupillary and corneals, good respiratory effort           Review of Systems    Vents:  Vent Mode: A/C (01/20/24 0736)  Set Rate: 16 BPM (01/20/24 0736)  Vt Set: 450 mL (01/20/24 0736)  Pressure Support: 7 cmH20 (01/18/24 1923)  PEEP/CPAP: 5 cmH20 (01/20/24 0736)  Oxygen Concentration (%): 30 (01/20/24 0800)  Peak Airway Pressure: 25 cmH20 (01/20/24 0736)  Plateau Pressure: 18 cmH20 (01/20/24 0736)  Total Ve: 7.8 L/m (01/20/24 0736)  Negative Inspiratory Force (cm H2O): 0 (01/20/24 0736)  F/VT Ratio<105 (RSBI): (!) 36.88 (01/20/24  0736)    Lines/Drains/Airways       Central Venous Catheter Line  Duration                  Hemodialysis Catheter right subclavian -- days              Drain  Duration                  NG/OG Tube 01/15/24 1333 orogastric Center mouth 4 days         Urethral Catheter 01/15/24 1332 Latex;Double-lumen 16 Fr. 4 days              Airway  Duration                  Airway - Non-Surgical 01/15/24 Endotracheal Tube 5 days              Peripheral Intravenous Line  Duration                  Peripheral IV - Single Lumen 01/15/24 1302 18 G Left Antecubital 4 days         Peripheral IV - Single Lumen 01/15/24 1313 18 G Right Antecubital 4 days         Peripheral IV - Single Lumen 01/19/24 0115 20 G Anterior;Left Forearm 1 day                    Significant Labs:    CBC/Anemia Profile:  Recent Labs   Lab 01/19/24  0325 01/20/24  0409   WBC 20.54* 21.08*   HGB 10.9* 10.2*   HCT 33.9* 32.2*    348   MCV 93 93   RDW 14.7* 15.2*        Chemistries:  Recent Labs   Lab 01/19/24  0156 01/19/24  0325 01/20/24  0409   * 133* 135*   K 3.9 3.9 3.5    100 102   CO2 20* 20* 21*   BUN 31* 34* 60*   CREATININE 4.5* 4.6* 5.7*   CALCIUM 8.7 8.8 8.9   ALBUMIN  --  2.3* 2.2*   PROT  --  7.4 7.2   BILITOT  --  0.3 0.3   ALKPHOS  --  145* 130   ALT  --  31 24   AST  --  35 35   MG 2.1 2.1 2.3   PHOS  --  3.4 3.9       All pertinent labs within the past 24 hours have been reviewed.    Significant Imaging:  I have reviewed all pertinent imaging results/findings within the past 24 hours.    ABG  Recent Labs   Lab 01/19/24  0401   PH 7.480*   PO2 85   PCO2 27.4*   HCO3 20.4*   BE -3*     Assessment/Plan:     Cardiac/Vascular  HTN (hypertension)  - holding home meds acutely  - will restart, as necessary    Cardiac arrest  - initially thought Cardiac most likely given runs of NSVT and good oxgenation, but now blood cultures are showing Staph (though no evidence of true septic shock given stable hemodynamics)  - empiric Abx  - Cardiology  following  - back on the amio gtt 1/19  - replete electrolytes cautiously given ESRD  - Echo with mildly decreased LVEF  - Cardiac monitoring  - avoid fever  - initial CT Head unremarkable  - EEG with severe diffuse encephalopathy    Renal/  ESRD (end stage renal disease)  - MWF via left chest vascath  - no acute indication  - Nephrology consulted  - tolerated dialysis 1/18  - renally dose meds and avoid nephrotoxins    ID  MRSA bacteremia  - Urine and Blood from 1/15 positive for MRSA  - repeat cultures 1/17 NGTD  - continue Vanc, empiric Merrem  - consulted ID  - hemodynamics stable    Influenza B  - likely an incidental finding given her excellent oxygenation  - will hold off on Tamiflu given ESRD  - Droplet precautions  - monitor    Chronic osteomyelitis  - WOCN consulted  - best I can tell, not on any chronic Abx for this.  It seems she gets spot treated here and there and follows with outpt Wound Care regularly  - blood Cx 1/15 now with MRSA  - getting broad spectrum Abx  - repeat Cx pending  - ID following    Endocrine  Type 2 diabetes mellitus with kidney complication, with long-term current use of insulin  - SSI/Accuchecks      Critical Care Time: 38 minutes  Critical secondary to respiratory failure, cardiac arrest      Critical care was time spent personally by me on the following activities: development of treatment plan with patient or surrogate and bedside caregivers, discussions with consultants, evaluation of patient's response to treatment, examination of patient, ordering and performing treatments and interventions, ordering and review of laboratory studies, ordering and review of radiographic studies, pulse oximetry, re-evaluation of patient's condition. This critical care time did not overlap with that of any other provider or involve time for any procedures.     David Barrios MD  Critical Care Medicine  '[place] - Intensive Care ([facility])

## 2024-01-20 NOTE — PROGRESS NOTES
Pharmacokinetic Assessment Follow Up: IV Vancomycin    Vancomycin serum concentration assessment(s):    The random level was drawn correctly and can be used to guide therapy at this time. The measurement is within the desired definitive target range of 15 to 20 mcg/mL.    Vancomycin Regimen Plan:    Continue pulse dosing regimen with a one time dose of vancomycin 500 mg.    Re-dose when the random level is less than 20 mcg/mL, next level to be drawn at 0430 on 01/22    Drug levels (last 3 results):  Recent Labs   Lab Result Units 01/18/24  0419 01/20/24  0409   Vancomycin, Random ug/mL 19.2 17.2       Pharmacy will continue to follow and monitor vancomycin.    Please contact pharmacy at extension [phone] for questions regarding this assessment.    Thank you for the consult,   Sonya Dickson       Patient brief summary:  Sydney Lejeune is a 73 y.o. female initiated on antimicrobial therapy with IV Vancomycin for treatment of MRSA bacteremia, UTI, & chronic osteomyelitis      The patient's current regimen is pulse dosing.    Drug Allergies:   Review of patient's allergies indicates:   Allergen Reactions    Tetanus vaccines and toxoid Nausea And Vomiting    Codeine Other (See Comments)     Upset stomach       Actual Body Weight:   108.9 kg    Renal Function:   Estimated Creatinine Clearance: 14.3 mL/min (A) (based on SCr of 4.6 mg/dL (H)).,     Dialysis Method (if applicable):  N/A     CBC (last 72 hours):  Recent Labs   Lab Result Units 01/18/24  0419 01/19/24  0325 01/20/24  0409   WBC K/uL 15.24*  15.24* 20.54* 21.08*   Hemoglobin g/dL 9.8*  9.8* 10.9* 10.2*   Hematocrit % 31.8*  31.8* 33.9* 32.2*   Platelets K/uL 337  337 370 348   Gran % % 79.6*  79.6* 90.1* 83.7*   Lymph % % 7.8*  7.8* 2.0* 6.3*   Mono % % 7.8  7.8 6.1 8.1   Eosinophil % % 3.5  3.5 0.0 0.8   Basophil % % 0.4  0.4 0.3 0.2   Differential Method  Automated  Automated Automated Automated       Metabolic Panel (last 72 hours):  Recent Labs    Lab Result Units 01/18/24  0419 01/19/24  0156 01/19/24  0325   Sodium mmol/L 133* 132* 133*   Potassium mmol/L 4.7 3.9 3.9   Chloride mmol/L 103 100 100   CO2 mmol/L 17* 20* 20*   Glucose mg/dL 244* 461* 477*   BUN mg/dL 34* 31* 34*   Creatinine mg/dL 5.7* 4.5* 4.6*   Albumin g/dL 2.2*  --  2.3*   Total Bilirubin mg/dL 0.2  --  0.3   Alkaline Phosphatase U/L 127  --  145*   AST U/L 51*  --  35   ALT U/L 35  --  31   Magnesium mg/dL 2.2 2.1 2.1   Phosphorus mg/dL 5.1*  --  3.4       Vancomycin Administrations:  vancomycin given in the last 96 hours                     vancomycin (VANCOCIN) 500 mg in dextrose 5 % in water (D5W) 100 mL IVPB (MB+) (mg) 500 mg New Bag 01/18/24 1812                    Microbiologic Results:  Microbiology Results (last 7 days)       Procedure Component Value Units Date/Time    Blood culture [[id number]] Collected: 01/17/24 0909    Order Status: Completed Specimen: Blood from Antecubital, Right Hand Updated: 01/19/24 2012     Blood Culture, Routine No Growth to date      No Growth to date      No Growth to date    Blood culture [[id number]] Collected: 01/17/24 0909    Order Status: Completed Specimen: Blood Updated: 01/19/24 2012     Blood Culture, Routine No Growth to date      No Growth to date      No Growth to date    Aerobic culture [[id number]] Collected: 01/18/24 1346    Order Status: Sent Specimen: Wound from Foot, Left Updated: 01/18/24 2112    Culture, Anaerobe [[id number]] Collected: 01/18/24 1346    Order Status: Sent Specimen: Wound from Foot, Left Updated: 01/18/24 2112    Urine culture [[id number]]  (Abnormal)  (Susceptibility) Collected: 01/15/24 1311    Order Status: Completed Specimen: Urine Updated: 01/18/24 1224     Urine Culture, Routine METHICILLIN RESISTANT STAPHYLOCOCCUS AUREUS  > 100,000 cfu/ml      Narrative:      Specimen Source->Urine    Blood culture x two cultures. Draw prior to antibiotics. [[id number]]  (Abnormal) Collected: 01/15/24 1308    Order Status:  Completed Specimen: Blood from Peripheral, Antecubital, Left Updated: 01/18/24 1014     Blood Culture, Routine Gram stain aer bottle: Gram positive cocci in clusters resembling Staph      Results called to and read back by: Gladis Ricci RN 01/16/2024  08:16      Gram stain olegario bottle: Gram positive cocci in clusters resembling Staph      Positive results previously called 01/16/2024  10:29      METHICILLIN RESISTANT STAPHYLOCOCCUS AUREUS  ID consult required at [facility].  For susceptibility see order #[id number]      Narrative:      Aerobic and anaerobic    Blood culture x two cultures. Draw prior to antibiotics. [[id number]]  (Abnormal)  (Susceptibility) Collected: 01/15/24 1309    Order Status: Completed Specimen: Blood from Peripheral, Antecubital, Right Updated: 01/18/24 1012     Blood Culture, Routine Gram stain aer bottle: Gram positive cocci in clusters resembling Staph      Results called to and read back by: Gladis Ricci RN 01/16/2024  08:16      Gram stain olegario bottle: Gram positive cocci in clusters resembling Staph      Positive results previously called 01/16/2024  10:29      METHICILLIN RESISTANT STAPHYLOCOCCUS AUREUS  ID consult required at [facility] and [facility].      Narrative:      Aerobic and anaerobic    Rapid Organism ID by PCR (from Blood culture) [[id number]]  (Abnormal) Collected: 01/15/24 1309    Order Status: Completed Updated: 01/16/24 0931     Enterococcus faecalis Not Detected     Enterococcus faecium Not Detected     Listeria monocytogenes Not Detected     Staphylococcus spp. See species for ID     Staphylococcus aureus Detected     Staphylococcus epidermidis Not Detected     Staphylococcus lugdunensis Not Detected     Streptococcus species Not Detected     Streptococcus agalactiae Not Detected     Streptococcus pneumoniae Not Detected     Streptococcus pyogenes Not Detected     Acinetobacter calcoaceticus/baumannii complex Not Detected      Bacteroides fragilis Not Detected     Enterobacterales Not Detected     Enterobacter cloacae complex Not Detected     Escherichia coli Not Detected     Klebsiella aerogenes Not Detected     Klebsiella oxytoca Not Detected     Klebsiella pneumoniae group Not Detected     Proteus Not Detected     Salmonella sp Not Detected     Serratia marcescens Not Detected     Haemophilus influenzae Not Detected     Neisseria meningtidis Not Detected     Pseudomonas aeruginosa Not Detected     Stenotrophomonas maltophilia Not Detected     Candida albicans Not Detected     Candida auris Not Detected     Candida glabrata Not Detected     Candida krusei Not Detected     Candida parapsilosis Not Detected     Candida tropicalis Not Detected     Cryptococcus neoformans/gattii Not Detected     CTX-M (ESBL ) Test Not Applicable     IMP (Carbapenem resistant) Test Not Applicable     KPC resistance gene (Carbapenem resistant) Test Not Applicable     mcr-1  Test Not Applicable     mec A/C  Test Not Applicable     mec A/C and MREJ (MRSA) gene Detected     NDM (Carbapenem resistant) Test Not Applicable     OXA-48-like (Carbapenem resistant) Test Not Applicable     van A/B (VRE gene) Test Not Applicable     VIM (Carbapenem resistant) Test Not Applicable    Narrative:      Aerobic and anaerobic    Influenza A & B by Molecular [[id number]]  (Abnormal) Collected: 01/15/24 1311    Order Status: Completed Specimen: Nasopharyngeal Swab Updated: 01/15/24 1341     Influenza A, Molecular Negative     Influenza B, Molecular Positive     Flu A & B Source Nasal swab

## 2024-01-20 NOTE — PLAN OF CARE
Problem: Infection  Goal: Absence of Infection Signs and Symptoms  Outcome: Ongoing, Progressing     Problem: Adult Inpatient Plan of Care  Goal: Plan of Care Review  Outcome: Ongoing, Progressing  Goal: Patient-Specific Goal (Individualized)  Outcome: Ongoing, Progressing  Goal: Absence of Hospital-Acquired Illness or Injury  Outcome: Ongoing, Progressing  Goal: Optimal Comfort and Wellbeing  Outcome: Ongoing, Progressing  Goal: Readiness for Transition of Care  Outcome: Ongoing, Progressing     Problem: Impaired Wound Healing  Goal: Optimal Wound Healing  Outcome: Ongoing, Progressing     Problem: Skin Injury Risk Increased  Goal: Skin Health and Integrity  Outcome: Ongoing, Progressing     Problem: Fall Injury Risk  Goal: Absence of Fall and Fall-Related Injury  Outcome: Ongoing, Progressing     Problem: Device-Related Complication Risk (Hemodialysis)  Goal: Safe, Effective Therapy Delivery  Outcome: Ongoing, Progressing     Problem: Hemodynamic Instability (Hemodialysis)  Goal: Effective Tissue Perfusion  Outcome: Ongoing, Progressing     Problem: Infection (Hemodialysis)  Goal: Absence of Infection Signs and Symptoms  Outcome: Ongoing, Progressing     Problem: Adjustment to Illness (Sepsis/Septic Shock)  Goal: Optimal Coping  Outcome: Ongoing, Progressing     Problem: Bleeding (Sepsis/Septic Shock)  Goal: Absence of Bleeding  Outcome: Ongoing, Progressing     Problem: Glycemic Control Impaired (Sepsis/Septic Shock)  Goal: Blood Glucose Level Within Desired Range  Outcome: Ongoing, Progressing     Problem: Infection Progression (Sepsis/Septic Shock)  Goal: Absence of Infection Signs and Symptoms  Outcome: Ongoing, Progressing     Problem: Nutrition Impaired (Sepsis/Septic Shock)  Goal: Optimal Nutrition Intake  Outcome: Ongoing, Progressing        Patient remained free of falls/injury/trauma throughout shift. Patient remains on ventilator via endotracheal tube. Neuro status changes occurred throughout shift.  KAREN Braun NP aware. Patient GCS 7. Patient withdraws to pain on left side and now has positive cough and corneal reflexes. VSS. Amio gtt continues to infuse at rate of 1 mg. Patient continues to have decreased urine output per smiley catheter in place. Patient continues to have several bowel movements. Novasource Renal continues at goal rate of 40 mL/hr. Contact and Droplet precautions maintained. POC remains ongoing.

## 2024-01-20 NOTE — ASSESSMENT & PLAN NOTE
- initially thought Cardiac most likely given runs of NSVT and good oxgenation, but now blood cultures are showing Staph (though no evidence of true septic shock given stable hemodynamics)  - empiric Abx  - Cardiology following  - back on the amio gtt 1/19  - replete electrolytes cautiously given ESRD  - Echo with mildly decreased LVEF  - Cardiac monitoring  - avoid fever  - initial CT Head unremarkable  - EEG with severe diffuse encephalopathy

## 2024-01-20 NOTE — SUBJECTIVE & OBJECTIVE
Interval History: No acute events.  Nursing reports some movement to the LUE overnight.  Nothing for me on exam this morning.  Opening eyes spontaneously.  Good respiratory effort.        Objective:     Vital Signs (Most Recent):  Temp: 98 °F (36.7 °C) (01/20/24 0323)  Pulse: 62 (01/20/24 0800)  Resp: 18 (01/20/24 0800)  BP: (!) 139/102 (01/20/24 0600)  SpO2: 100 % (01/20/24 0800) Vital Signs (24h Range):  Temp:  [97.6 °F (36.4 °C)-98.9 °F (37.2 °C)] 98 °F (36.7 °C)  Pulse:  [58-71] 62  Resp:  [17-33] 18  SpO2:  [94 %-100 %] 100 %  BP: (111-163)/() 139/102     Weight: 108.9 kg (240 lb 1.3 oz)  Body mass index is 37.6 kg/m².      Intake/Output Summary (Last 24 hours) at 1/20/2024 0820  Last data filed at 1/20/2024 0600  Gross per 24 hour   Intake 1197.08 ml   Output 105 ml   Net 1092.08 ml        Physical Exam  Vitals and nursing note reviewed.   Constitutional:       Comments: Intubated, no sedation   Cardiovascular:      Rate and Rhythm: Normal rate and regular rhythm.      Pulses: Normal pulses.      Heart sounds: No murmur heard.  Pulmonary:      Effort: Pulmonary effort is normal. No respiratory distress.      Breath sounds: No wheezing, rhonchi or rales.   Abdominal:      General: Abdomen is flat. There is no distension.      Palpations: Abdomen is soft.      Tenderness: There is no abdominal tenderness.   Musculoskeletal:      Right lower leg: Edema present.      Left lower leg: Edema present.   Neurological:      Comments: Opens eyes to pain, no withdrawal, no cough, intact pupillary and corneals, good respiratory effort           Review of Systems    Vents:  Vent Mode: A/C (01/20/24 0736)  Set Rate: 16 BPM (01/20/24 0736)  Vt Set: 450 mL (01/20/24 0736)  Pressure Support: 7 cmH20 (01/18/24 1923)  PEEP/CPAP: 5 cmH20 (01/20/24 0736)  Oxygen Concentration (%): 30 (01/20/24 0800)  Peak Airway Pressure: 25 cmH20 (01/20/24 0736)  Plateau Pressure: 18 cmH20 (01/20/24 0736)  Total Ve: 7.8 L/m (01/20/24  0736)  Negative Inspiratory Force (cm H2O): 0 (01/20/24 0736)  F/VT Ratio<105 (RSBI): (!) 36.88 (01/20/24 0736)    Lines/Drains/Airways       Central Venous Catheter Line  Duration                  Hemodialysis Catheter right subclavian -- days              Drain  Duration                  NG/OG Tube 01/15/24 1333 orogastric Center mouth 4 days         Urethral Catheter 01/15/24 1332 Latex;Double-lumen 16 Fr. 4 days              Airway  Duration                  Airway - Non-Surgical 01/15/24 Endotracheal Tube 5 days              Peripheral Intravenous Line  Duration                  Peripheral IV - Single Lumen 01/15/24 1302 18 G Left Antecubital 4 days         Peripheral IV - Single Lumen 01/15/24 1313 18 G Right Antecubital 4 days         Peripheral IV - Single Lumen 01/19/24 0115 20 G Anterior;Left Forearm 1 day                    Significant Labs:    CBC/Anemia Profile:  Recent Labs   Lab 01/19/24  0325 01/20/24  0409   WBC 20.54* 21.08*   HGB 10.9* 10.2*   HCT 33.9* 32.2*    348   MCV 93 93   RDW 14.7* 15.2*        Chemistries:  Recent Labs   Lab 01/19/24  0156 01/19/24  0325 01/20/24  0409   * 133* 135*   K 3.9 3.9 3.5    100 102   CO2 20* 20* 21*   BUN 31* 34* 60*   CREATININE 4.5* 4.6* 5.7*   CALCIUM 8.7 8.8 8.9   ALBUMIN  --  2.3* 2.2*   PROT  --  7.4 7.2   BILITOT  --  0.3 0.3   ALKPHOS  --  145* 130   ALT  --  31 24   AST  --  35 35   MG 2.1 2.1 2.3   PHOS  --  3.4 3.9       All pertinent labs within the past 24 hours have been reviewed.    Significant Imaging:  I have reviewed all pertinent imaging results/findings within the past 24 hours.

## 2024-01-20 NOTE — PLAN OF CARE
Pt remains intubated on pressure support. VSS, sinus rhythm on heart monitor. Pt responds to pain, withdraws the LUE/opens eyes. Amio gtt infusing. Tube feedings are at goal rate, pt is tolerating well. Pt turned q2h for skin breakdown prevention. Bed is locked in lowest position, side rails up x2, bed alarm set.    Temp:  [98.2 °F (36.8 °C)-98.3 °F (36.8 °C)]   Pulse:  [61-67]   Resp:  [17-23]   BP: (134-172)/()   SpO2:  [98 %-100 %]        Problem: Infection  Goal: Absence of Infection Signs and Symptoms  Outcome: Ongoing, Progressing     Problem: Adult Inpatient Plan of Care  Goal: Plan of Care Review  Outcome: Ongoing, Progressing  Goal: Patient-Specific Goal (Individualized)  Outcome: Ongoing, Progressing  Goal: Absence of Hospital-Acquired Illness or Injury  Outcome: Ongoing, Progressing  Goal: Optimal Comfort and Wellbeing  Outcome: Ongoing, Progressing  Goal: Readiness for Transition of Care  Outcome: Ongoing, Progressing     Problem: Diabetes Comorbidity  Goal: Blood Glucose Level Within Targeted Range  Outcome: Ongoing, Progressing     Problem: Impaired Wound Healing  Goal: Optimal Wound Healing  Outcome: Ongoing, Progressing     Problem: Skin Injury Risk Increased  Goal: Skin Health and Integrity  Outcome: Ongoing, Progressing     Problem: Fall Injury Risk  Goal: Absence of Fall and Fall-Related Injury  Outcome: Ongoing, Progressing     Problem: Device-Related Complication Risk (Hemodialysis)  Goal: Safe, Effective Therapy Delivery  Outcome: Ongoing, Progressing     Problem: Hemodynamic Instability (Hemodialysis)  Goal: Effective Tissue Perfusion  Outcome: Ongoing, Progressing     Problem: Infection (Hemodialysis)  Goal: Absence of Infection Signs and Symptoms  Outcome: Ongoing, Progressing     Problem: Adjustment to Illness (Sepsis/Septic Shock)  Goal: Optimal Coping  Outcome: Ongoing, Progressing     Problem: Bleeding (Sepsis/Septic Shock)  Goal: Absence of Bleeding  Outcome: Ongoing,  Progressing     Problem: Glycemic Control Impaired (Sepsis/Septic Shock)  Goal: Blood Glucose Level Within Desired Range  Outcome: Ongoing, Progressing     Problem: Infection Progression (Sepsis/Septic Shock)  Goal: Absence of Infection Signs and Symptoms  Outcome: Ongoing, Progressing     Problem: Nutrition Impaired (Sepsis/Septic Shock)  Goal: Optimal Nutrition Intake  Outcome: Ongoing, Progressing

## 2024-01-20 NOTE — PROGRESS NOTES
[facility] ([facility])  Nephrology  Progress Note    Patient Name: Sydney Lejeune  MRN: [id number]  Admission Date: 1/15/2024  Hospital Length of Stay: 5 days  Attending Provider: David Barrios MD   Primary Care Physician: Maritza, Primary Doctor  Principal Problem:<principal problem not specified>  Reason for Consult: Management of ESRD      Subjective:       72 yo female with ESRD out of hospital cardiac arrest ICU day#5 last inpatient dialysis 1/18/24   - no acute events      Review of patient's allergies indicates:   Allergen Reactions    Tetanus vaccines and toxoid Nausea And Vomiting    Codeine Other (See Comments)     Upset stomach     Current Facility-Administered Medications   Medication Frequency    amiodarone 360 mg/200 mL (1.8 mg/mL) infusion Continuous    aspirin chewable tablet 81 mg Daily    chlorhexidine 0.12 % solution 15 mL BID    dextrose 10% bolus 125 mL 125 mL PRN    dextrose 10% bolus 250 mL 250 mL PRN    famotidine 40 mg/5 mL (8 mg/mL) suspension 20 mg Daily    glucagon (human recombinant) injection 1 mg PRN    heparin (porcine) injection 5,000 Units Q8H    hydrALAZINE injection 10 mg Q4H PRN    insulin aspart U-100 pen 0-15 Units Q4H PRN    insulin detemir U-100 (Levemir) pen 30 Units BID    meropenem (MERREM) 500 mg in sodium chloride 0.9 % 100 mL IVPB (MB+) Q24H    metoprolol tartrate (LOPRESSOR) tablet 50 mg BID    mupirocin 2 % ointment BID    polyethylene glycol packet 17 g Daily    pravastatin tablet 40 mg Daily    senna-docusate 8.6-50 mg per tablet 2 tablet Daily    sodium chloride 0.9% flush 10 mL PRN    vancomycin - pharmacy to dose pharmacy to manage frequency       Objective:     Vital Signs (Most Recent):  Temp: 98.3 °F (36.8 °C) (01/20/24 0700)  Pulse: 62 (01/20/24 1107)  Resp: 20 (01/20/24 1107)  BP: (!) 153/65 (01/20/24 1107)  SpO2: 99 % (01/20/24 1107) Vital Signs (24h Range):  Temp:  [97.6 °F (36.4 °C)-98.9 °F (37.2 °C)] 98.3 °F (36.8 °C)  Pulse:  [58-71] 62  Resp:   [17-33] 20  SpO2:  [94 %-100 %] 99 %  BP: (111-163)/() 153/65     Weight: 108.9 kg (240 lb 1.3 oz) (01/17/24 1018)  Body mass index is 37.6 kg/m².  Body surface area is 2.27 meters squared.    I/O last 3 completed shifts:  In: 2413.2 [I.V.:941.4; NG/GT:1130; IV Piggyback:341.7]  Out: 250 [Urine:250]    Physical Exam  Vitals and nursing note reviewed.   Constitutional:       Interventions: She is intubated.   Pulmonary:      Effort: She is intubated.         Significant Labs:labs reviewed         Assessment/Plan:     Active Diagnoses:    Diagnosis Date Noted POA    Severe sepsis [A41.9, R65.20] 01/19/2024 Yes    Ventricular tachycardia [I47.20] 01/19/2024 Yes    MRSA bacteremia [R78.81, B95.62] 01/17/2024 Yes    Cardiac arrest [I46.9] 01/15/2024 Yes    Type 2 diabetes mellitus with kidney complication, with long-term current use of insulin [E11.29, Z79.4] 01/15/2024 Not Applicable    ESRD (end stage renal disease) [N18.6] 01/15/2024 Yes    HTN (hypertension) [I10] 01/15/2024 Yes    Chronic osteomyelitis [M86.60] 01/15/2024 Yes    Influenza B [J10.1] 01/15/2024 Yes      Problems Resolved During this Admission:     ESRD  - labs and oxygenation reviewed, no indications for dialysis     Nic Medina MD  Nephrology  [facility] (Hospital)  I spent a total of 25 minutes on the day of the visit.This includes face to face time and non-face to face time preparing to see the patient (eg, review of tests), obtaining and/or reviewing separately obtained history, documenting clinical information in the electronic or other health record, independently interpreting results and communicating results to the patient/family/caregiver, and/or care coordinator/hospital care team.

## 2024-01-21 LAB
ALBUMIN SERPL BCP-MCNC: 2.3 G/DL (ref 3.5–5.2)
ALP SERPL-CCNC: 150 U/L (ref 55–135)
ALT SERPL W/O P-5'-P-CCNC: 29 U/L (ref 10–44)
ANION GAP SERPL CALC-SCNC: 16 MMOL/L (ref 8–16)
AST SERPL-CCNC: 48 U/L (ref 10–40)
BASOPHILS # BLD AUTO: 0.04 K/UL (ref 0–0.2)
BASOPHILS NFR BLD: 0.2 % (ref 0–1.9)
BILIRUB SERPL-MCNC: 0.3 MG/DL (ref 0.1–1)
BUN SERPL-MCNC: 86 MG/DL (ref 8–23)
CALCIUM SERPL-MCNC: 8.4 MG/DL (ref 8.7–10.5)
CHLORIDE SERPL-SCNC: 99 MMOL/L (ref 95–110)
CO2 SERPL-SCNC: 20 MMOL/L (ref 23–29)
CREAT SERPL-MCNC: 6.7 MG/DL (ref 0.5–1.4)
DIFFERENTIAL METHOD BLD: ABNORMAL
EOSINOPHIL # BLD AUTO: 0.3 K/UL (ref 0–0.5)
EOSINOPHIL NFR BLD: 1.3 % (ref 0–8)
ERYTHROCYTE [DISTWIDTH] IN BLOOD BY AUTOMATED COUNT: 15.1 % (ref 11.5–14.5)
EST. GFR  (NO RACE VARIABLE): 6 ML/MIN/1.73 M^2
GLUCOSE SERPL-MCNC: 156 MG/DL (ref 70–110)
HCT VFR BLD AUTO: 31.1 % (ref 37–48.5)
HGB BLD-MCNC: 9.9 G/DL (ref 12–16)
IMM GRANULOCYTES # BLD AUTO: 0.15 K/UL (ref 0–0.04)
IMM GRANULOCYTES NFR BLD AUTO: 0.7 % (ref 0–0.5)
LYMPHOCYTES # BLD AUTO: 1.7 K/UL (ref 1–4.8)
LYMPHOCYTES NFR BLD: 7.8 % (ref 18–48)
MAGNESIUM SERPL-MCNC: 2.7 MG/DL (ref 1.6–2.6)
MCH RBC QN AUTO: 29.6 PG (ref 27–31)
MCHC RBC AUTO-ENTMCNC: 31.8 G/DL (ref 32–36)
MCV RBC AUTO: 93 FL (ref 82–98)
MONOCYTES # BLD AUTO: 1.8 K/UL (ref 0.3–1)
MONOCYTES NFR BLD: 8.5 % (ref 4–15)
NEUTROPHILS # BLD AUTO: 17.5 K/UL (ref 1.8–7.7)
NEUTROPHILS NFR BLD: 81.5 % (ref 38–73)
NRBC BLD-RTO: 0 /100 WBC
PHOSPHATE SERPL-MCNC: 5 MG/DL (ref 2.7–4.5)
PLATELET # BLD AUTO: 358 K/UL (ref 150–450)
PMV BLD AUTO: 9.8 FL (ref 9.2–12.9)
POCT GLUCOSE: 126 MG/DL (ref 70–110)
POCT GLUCOSE: 134 MG/DL (ref 70–110)
POCT GLUCOSE: 137 MG/DL (ref 70–110)
POCT GLUCOSE: 139 MG/DL (ref 70–110)
POCT GLUCOSE: 150 MG/DL (ref 70–110)
POCT GLUCOSE: 172 MG/DL (ref 70–110)
POTASSIUM SERPL-SCNC: 3.7 MMOL/L (ref 3.5–5.1)
PROT SERPL-MCNC: 7.1 G/DL (ref 6–8.4)
RBC # BLD AUTO: 3.35 M/UL (ref 4–5.4)
SODIUM SERPL-SCNC: 135 MMOL/L (ref 136–145)
WBC # BLD AUTO: 21.47 K/UL (ref 3.9–12.7)

## 2024-01-21 PROCEDURE — 36415 COLL VENOUS BLD VENIPUNCTURE: CPT | Performed by: INTERNAL MEDICINE

## 2024-01-21 PROCEDURE — 25000003 PHARM REV CODE 250: Performed by: INTERNAL MEDICINE

## 2024-01-21 PROCEDURE — 84100 ASSAY OF PHOSPHORUS: CPT | Performed by: INTERNAL MEDICINE

## 2024-01-21 PROCEDURE — 80053 COMPREHEN METABOLIC PANEL: CPT | Performed by: INTERNAL MEDICINE

## 2024-01-21 PROCEDURE — 25000003 PHARM REV CODE 250: Performed by: NURSE PRACTITIONER

## 2024-01-21 PROCEDURE — 99231 SBSQ HOSP IP/OBS SF/LOW 25: CPT | Mod: ,,, | Performed by: INTERNAL MEDICINE

## 2024-01-21 PROCEDURE — 27000207 HC ISOLATION

## 2024-01-21 PROCEDURE — 85025 COMPLETE CBC W/AUTO DIFF WBC: CPT | Performed by: INTERNAL MEDICINE

## 2024-01-21 PROCEDURE — 27100171 HC OXYGEN HIGH FLOW UP TO 24 HOURS

## 2024-01-21 PROCEDURE — 94761 N-INVAS EAR/PLS OXIMETRY MLT: CPT

## 2024-01-21 PROCEDURE — 63600175 PHARM REV CODE 636 W HCPCS: Performed by: INTERNAL MEDICINE

## 2024-01-21 PROCEDURE — 27200966 HC CLOSED SUCTION SYSTEM

## 2024-01-21 PROCEDURE — 93010 ELECTROCARDIOGRAM REPORT: CPT | Mod: ,,, | Performed by: INTERNAL MEDICINE

## 2024-01-21 PROCEDURE — 25000003 PHARM REV CODE 250: Performed by: PHYSICIAN ASSISTANT

## 2024-01-21 PROCEDURE — 94003 VENT MGMT INPAT SUBQ DAY: CPT

## 2024-01-21 PROCEDURE — 99900035 HC TECH TIME PER 15 MIN (STAT)

## 2024-01-21 PROCEDURE — 27201109 HC SYSTEM FECAL MANAGEMENT

## 2024-01-21 PROCEDURE — 99900026 HC AIRWAY MAINTENANCE (STAT)

## 2024-01-21 PROCEDURE — 63600175 PHARM REV CODE 636 W HCPCS: Performed by: PHYSICIAN ASSISTANT

## 2024-01-21 PROCEDURE — 93005 ELECTROCARDIOGRAM TRACING: CPT

## 2024-01-21 PROCEDURE — 83735 ASSAY OF MAGNESIUM: CPT | Performed by: INTERNAL MEDICINE

## 2024-01-21 PROCEDURE — 20000000 HC ICU ROOM

## 2024-01-21 RX ADMIN — HEPARIN SODIUM 5000 UNITS: 5000 INJECTION INTRAVENOUS; SUBCUTANEOUS at 02:01

## 2024-01-21 RX ADMIN — ASPIRIN 81 MG CHEWABLE TABLET 81 MG: 81 TABLET CHEWABLE at 08:01

## 2024-01-21 RX ADMIN — AMIODARONE HYDROCHLORIDE 0.5 MG/MIN: 1.8 INJECTION, SOLUTION INTRAVENOUS at 05:01

## 2024-01-21 RX ADMIN — AMIODARONE HYDROCHLORIDE 0.5 MG/MIN: 1.8 INJECTION, SOLUTION INTRAVENOUS at 04:01

## 2024-01-21 RX ADMIN — CHLORHEXIDINE GLUCONATE 0.12% ORAL RINSE 15 ML: 1.2 LIQUID ORAL at 09:01

## 2024-01-21 RX ADMIN — HEPARIN SODIUM 5000 UNITS: 5000 INJECTION INTRAVENOUS; SUBCUTANEOUS at 10:01

## 2024-01-21 RX ADMIN — MEROPENEM 500 MG: 500 INJECTION INTRAVENOUS at 08:01

## 2024-01-21 RX ADMIN — INSULIN ASPART 2 UNITS: 100 INJECTION, SOLUTION INTRAVENOUS; SUBCUTANEOUS at 01:01

## 2024-01-21 RX ADMIN — PRAVASTATIN SODIUM 40 MG: 20 TABLET ORAL at 08:01

## 2024-01-21 RX ADMIN — METOPROLOL TARTRATE 50 MG: 50 TABLET, FILM COATED ORAL at 08:01

## 2024-01-21 RX ADMIN — METOPROLOL TARTRATE 50 MG: 50 TABLET, FILM COATED ORAL at 09:01

## 2024-01-21 RX ADMIN — HEPARIN SODIUM 5000 UNITS: 5000 INJECTION INTRAVENOUS; SUBCUTANEOUS at 06:01

## 2024-01-21 RX ADMIN — FAMOTIDINE 20 MG: 40 POWDER, FOR SUSPENSION ORAL at 08:01

## 2024-01-21 RX ADMIN — CHLORHEXIDINE GLUCONATE 0.12% ORAL RINSE 15 ML: 1.2 LIQUID ORAL at 08:01

## 2024-01-21 NOTE — PROGRESS NOTES
[facility] ([facility])  Nephrology  Progress Note    Patient Name: Sydney Lejeune  MRN: [id number]  Admission Date: 1/15/2024  Hospital Length of Stay: 6 days  Attending Provider: David Barrios MD   Primary Care Physician: Maritza, Primary Doctor  Principal Problem:<principal problem not specified>  Reason for Consult: Management of ESRD      Subjective:       74 yo female with ESRD out of hospital cardiac arrest ICU day#5 last inpatient dialysis 1/18/24   - no acute events      Review of patient's allergies indicates:   Allergen Reactions    Tetanus vaccines and toxoid Nausea And Vomiting    Codeine Other (See Comments)     Upset stomach     Current Facility-Administered Medications   Medication Frequency    amiodarone 360 mg/200 mL (1.8 mg/mL) infusion Continuous    aspirin chewable tablet 81 mg Daily    chlorhexidine 0.12 % solution 15 mL BID    dextrose 10% bolus 125 mL 125 mL PRN    dextrose 10% bolus 250 mL 250 mL PRN    famotidine 40 mg/5 mL (8 mg/mL) suspension 20 mg Daily    glucagon (human recombinant) injection 1 mg PRN    heparin (porcine) injection 5,000 Units Q8H    hydrALAZINE injection 10 mg Q4H PRN    insulin aspart U-100 pen 0-15 Units Q4H PRN    insulin detemir U-100 (Levemir) pen 30 Units BID    meropenem (MERREM) 500 mg in sodium chloride 0.9 % 100 mL IVPB (MB+) Q24H    metoprolol tartrate (LOPRESSOR) tablet 50 mg BID    polyethylene glycol packet 17 g Daily    pravastatin tablet 40 mg Daily    senna-docusate 8.6-50 mg per tablet 2 tablet Daily    sodium chloride 0.9% flush 10 mL PRN    vancomycin - pharmacy to dose pharmacy to manage frequency       Objective:     Vital Signs (Most Recent):  Temp: 98.5 °F (36.9 °C) (01/21/24 0700)  Pulse: 71 (01/21/24 1630)  Resp: (!) 27 (01/21/24 1630)  BP: (!) 168/74 (01/21/24 1630)  SpO2: (!) 90 % (01/21/24 1630) Vital Signs (24h Range):  Temp:  [98.2 °F (36.8 °C)-99 °F (37.2 °C)] 98.5 °F (36.9 °C)  Pulse:  [64-71] 71  Resp:  [5-41] 27  SpO2:  [90  %-100 %] 90 %  BP: (139-176)/(64-80) 168/74     Weight: 103.6 kg (228 lb 6.3 oz) (01/21/24 0600)  Body mass index is 35.77 kg/m².  Body surface area is 2.21 meters squared.    I/O last 3 completed shifts:  In: 2218.8 [I.V.:850.6; NG/GT:1080; IV Piggyback:288.2]  Out: 300 [Urine:280; Drains:20]    Physical Exam  Vitals and nursing note reviewed.   Constitutional:       Interventions: She is intubated.   Pulmonary:      Effort: She is intubated.         Significant Labs:labs reviewed         Assessment/Plan:     Active Diagnoses:    Diagnosis Date Noted POA    Severe sepsis [A41.9, R65.20] 01/19/2024 Yes    Ventricular tachycardia [I47.20] 01/19/2024 Yes    MRSA bacteremia [R78.81, B95.62] 01/17/2024 Yes    Cardiac arrest [I46.9] 01/15/2024 Yes    Type 2 diabetes mellitus with kidney complication, with long-term current use of insulin [E11.29, Z79.4] 01/15/2024 Not Applicable    ESRD (end stage renal disease) [N18.6] 01/15/2024 Yes    HTN (hypertension) [I10] 01/15/2024 Yes    Chronic osteomyelitis [M86.60] 01/15/2024 Yes    Influenza B [J10.1] 01/15/2024 Yes      Problems Resolved During this Admission:     ESRD  - labs and oxygenation reviewed, no indications for dialysis      d/w nurse    Nic Medina MD  Nephrology  [facility] ([facility])

## 2024-01-21 NOTE — ASSESSMENT & PLAN NOTE
- Urine and Blood from 1/15 positive for MRSA  - repeat blood cultures 1/17 NGTD  - wound cultures 1/18 growing Staph  - continue Vanc, empiric Merrem  - consulted ID  - hemodynamics stable

## 2024-01-21 NOTE — SUBJECTIVE & OBJECTIVE
Interval History: No acute events.  Vent and hemodynamics are stable; favoring hypertension.  No withdrawal to pain.  Opens eyes to stimulus.  Remains on PSV.      Objective:     Vital Signs (Most Recent):  Temp: 98.5 °F (36.9 °C) (01/21/24 0700)  Pulse: 68 (01/21/24 1200)  Resp: 20 (01/21/24 1200)  BP: (!) 165/77 (01/21/24 1200)  SpO2: 98 % (01/21/24 1200) Vital Signs (24h Range):  Temp:  [98.2 °F (36.8 °C)-99 °F (37.2 °C)] 98.5 °F (36.9 °C)  Pulse:  [62-71] 68  Resp:  [5-41] 20  SpO2:  [91 %-100 %] 98 %  BP: (139-176)/(64-80) 165/77     Weight: 103.6 kg (228 lb 6.3 oz)  Body mass index is 35.77 kg/m².      Intake/Output Summary (Last 24 hours) at 1/21/2024 1258  Last data filed at 1/21/2024 1200  Gross per 24 hour   Intake 1878.99 ml   Output 260 ml   Net 1618.99 ml        Physical Exam  Vitals and nursing note reviewed.   Constitutional:       General: She is not in acute distress.     Comments: Intubated, no sedation   Cardiovascular:      Rate and Rhythm: Normal rate and regular rhythm.      Pulses: Normal pulses.      Heart sounds: No murmur heard.  Pulmonary:      Effort: Pulmonary effort is normal. No respiratory distress.      Breath sounds: No wheezing, rhonchi or rales.   Abdominal:      General: Abdomen is flat. There is no distension.      Palpations: Abdomen is soft.      Tenderness: There is no abdominal tenderness.   Musculoskeletal:      Right lower leg: Edema present.      Left lower leg: Edema present.   Neurological:      Comments: Opens eyes to stimulation, no withdrawal to pain in any extremities, no cough for me.  Pupillary and corneal reflexes intact.  Remains on PSV.           Review of Systems    Vents:  Vent Mode: Spont (01/21/24 1119)  Set Rate: 16 BPM (01/20/24 0736)  Vt Set: 450 mL (01/20/24 0736)  Pressure Support: 7 cmH20 (01/21/24 1119)  PEEP/CPAP: 5 cmH20 (01/21/24 1119)  Oxygen Concentration (%): 30 (01/21/24 1200)  Peak Airway Pressure: 13 cmH20 (01/21/24 1119)  Plateau Pressure:  18 cmH20 (01/21/24 1119)  Total Ve: 8.09 L/m (01/21/24 1119)  Negative Inspiratory Force (cm H2O): 0 (01/21/24 1119)  F/VT Ratio<105 (RSBI): (!) 39.13 (01/21/24 1119)    Lines/Drains/Airways       Central Venous Catheter Line  Duration                  Hemodialysis Catheter right subclavian -- days              Drain  Duration                  NG/OG Tube 01/15/24 1333 orogastric Center mouth 5 days         Urethral Catheter 01/15/24 1332 Latex;Double-lumen 16 Fr. 5 days              Airway  Duration                  Airway - Non-Surgical 01/15/24 Endotracheal Tube 6 days              Peripheral Intravenous Line  Duration                  Peripheral IV - Single Lumen 01/15/24 1302 18 G Left Antecubital 5 days         Peripheral IV - Single Lumen 01/15/24 1313 18 G Right Antecubital 5 days         Peripheral IV - Single Lumen 01/19/24 0115 20 G Anterior;Left Forearm 2 days                    Significant Labs:    CBC/Anemia Profile:  Recent Labs   Lab 01/20/24  0409 01/21/24  0332   WBC 21.08* 21.47*   HGB 10.2* 9.9*   HCT 32.2* 31.1*    358   MCV 93 93   RDW 15.2* 15.1*        Chemistries:  Recent Labs   Lab 01/20/24  0409 01/21/24  0332   * 135*   K 3.5 3.7    99   CO2 21* 20*   BUN 60* 86*   CREATININE 5.7* 6.7*   CALCIUM 8.9 8.4*   ALBUMIN 2.2* 2.3*   PROT 7.2 7.1   BILITOT 0.3 0.3   ALKPHOS 130 150*   ALT 24 29   AST 35 48*   MG 2.3 2.7*   PHOS 3.9 5.0*       All pertinent labs within the past 24 hours have been reviewed.    Significant Imaging:  I have reviewed all pertinent imaging results/findings within the past 24 hours.

## 2024-01-21 NOTE — PLAN OF CARE
Problem: Infection  Goal: Absence of Infection Signs and Symptoms  Outcome: Ongoing, Not Progressing     Problem: Adult Inpatient Plan of Care  Goal: Plan of Care Review  Outcome: Ongoing, Not Progressing  Goal: Patient-Specific Goal (Individualized)  Outcome: Ongoing, Not Progressing  Goal: Absence of Hospital-Acquired Illness or Injury  Outcome: Ongoing, Not Progressing  Goal: Optimal Comfort and Wellbeing  Outcome: Ongoing, Not Progressing  Goal: Readiness for Transition of Care  Outcome: Ongoing, Not Progressing     Problem: Diabetes Comorbidity  Goal: Blood Glucose Level Within Targeted Range  Outcome: Ongoing, Not Progressing     Problem: Impaired Wound Healing  Goal: Optimal Wound Healing  Outcome: Ongoing, Not Progressing     Problem: Skin Injury Risk Increased  Goal: Skin Health and Integrity  Outcome: Ongoing, Not Progressing     Problem: Fall Injury Risk  Goal: Absence of Fall and Fall-Related Injury  Outcome: Ongoing, Not Progressing     Problem: Device-Related Complication Risk (Hemodialysis)  Goal: Safe, Effective Therapy Delivery  Outcome: Ongoing, Not Progressing     Problem: Hemodynamic Instability (Hemodialysis)  Goal: Effective Tissue Perfusion  Outcome: Ongoing, Not Progressing     Problem: Infection (Hemodialysis)  Goal: Absence of Infection Signs and Symptoms  Outcome: Ongoing, Not Progressing       Patient remained free of falls/injury/trauma throughout shift. Patient remains on ventilator via endotracheal tube. Neuro status changes intermittently throughout shift. With patient withdrawing to pain with LUE. Patient GCS 7 with postive cough and corneal reflexes. Amio gtt continues to infuse at rate of 0.5 mg/min without any eventful arrhythmias noted. Patient continues to have decreased urine output per smiley catheter in place. Novasource Renal continues at goal rate of 40 mL/hr. Contact and Droplet precautions maintained. POC remains ongoing.

## 2024-01-21 NOTE — PROGRESS NOTES
[facility] - Intensive Care ([facility])  Critical Care Medicine  Progress Note    Patient Name: Sydney Lejeune  MRN: [id number]  Admission Date: 1/15/2024  Hospital Length of Stay: 6 days  Code Status: Full Code  Attending Provider: David Barrios MD  Primary Care Provider: No, Primary Doctor   Principal Problem: <principal problem not specified>    Subjective:     HPI:  Ms Lejeune is a 72 y/o WF with ESRD, DM, HTN, and chronic left foot wound with osteo presents via EMS to the ER today after being found slumped over her steering wheel at a gas station.  History is taken from the medical record as the patient is unable to provide history and no family available at the time of my exam.  On EMS arrival, she was pulseless and apneic.  ROSC achieved following 6 rounds of CPR (Epi x 1 and 300mg of Amio).  She was intubated in the ER upon arrival.  Hemodynamics stable and low/mod vent requirements but ongoign short runs of NSVT.  Cardiology consulted in the ER.  Currently on amio gtt and propofol.  Initial CT Head unremarkable.  Labs notable for Lactic of 8, WBC 23.89, Hgb 9.8, K 3.6, BNP 1466, Trop 0.043, positive Flu B.  ABG shows metabolic acidosis with good respiratory compensation and pO2 505.  She is admitted to the ICU for post-arrest care.    Hospital/ICU Course:  1/15 - admitted to ICU following out of hospital cardiac arrest.  Minimal vent and hemodynamics stable.  1/16 - No acute events.  Rhythm stable.  Vent and hemodynamics stable.  Blood Cx GS with GPC in clusters.  1/17 - No acute events.  Vent and hemodynamics stable.  Neuro exam largely unchanged.  1/18 - No acute events.  Vent and hemodynamics stable.  Neuro exam largely unchanged.  1/19 - No significant changes.  EEG showed severe diffuse encephalopathy.  1/20 - No significant changes.  Reports of some more L sided movement overnight, but nothing for me.  1/21 - Vent and hemodynamics stable.  Neuro exam unchanged.  Remains on PSV.    Interval History: No  acute events.  Vent and hemodynamics are stable; favoring hypertension.  No withdrawal to pain.  Opens eyes to stimulus.  Remains on PSV.      Objective:     Vital Signs (Most Recent):  Temp: 98.5 °F (36.9 °C) (01/21/24 0700)  Pulse: 68 (01/21/24 1200)  Resp: 20 (01/21/24 1200)  BP: (!) 165/77 (01/21/24 1200)  SpO2: 98 % (01/21/24 1200) Vital Signs (24h Range):  Temp:  [98.2 °F (36.8 °C)-99 °F (37.2 °C)] 98.5 °F (36.9 °C)  Pulse:  [62-71] 68  Resp:  [5-41] 20  SpO2:  [91 %-100 %] 98 %  BP: (139-176)/(64-80) 165/77     Weight: 103.6 kg (228 lb 6.3 oz)  Body mass index is 35.77 kg/m².      Intake/Output Summary (Last 24 hours) at 1/21/2024 1258  Last data filed at 1/21/2024 1200  Gross per 24 hour   Intake 1878.99 ml   Output 260 ml   Net 1618.99 ml        Physical Exam  Vitals and nursing note reviewed.   Constitutional:       General: She is not in acute distress.     Comments: Intubated, no sedation   Cardiovascular:      Rate and Rhythm: Normal rate and regular rhythm.      Pulses: Normal pulses.      Heart sounds: No murmur heard.  Pulmonary:      Effort: Pulmonary effort is normal. No respiratory distress.      Breath sounds: No wheezing, rhonchi or rales.   Abdominal:      General: Abdomen is flat. There is no distension.      Palpations: Abdomen is soft.      Tenderness: There is no abdominal tenderness.   Musculoskeletal:      Right lower leg: Edema present.      Left lower leg: Edema present.   Neurological:      Comments: Opens eyes to stimulation, no withdrawal to pain in any extremities, no cough for me.  Pupillary and corneal reflexes intact.  Remains on PSV.           Review of Systems    Vents:  Vent Mode: Spont (01/21/24 1119)  Set Rate: 16 BPM (01/20/24 0736)  Vt Set: 450 mL (01/20/24 0736)  Pressure Support: 7 cmH20 (01/21/24 1119)  PEEP/CPAP: 5 cmH20 (01/21/24 1119)  Oxygen Concentration (%): 30 (01/21/24 1200)  Peak Airway Pressure: 13 cmH20 (01/21/24 1119)  Plateau Pressure: 18 cmH20 (01/21/24  1119)  Total Ve: 8.09 L/m (01/21/24 1119)  Negative Inspiratory Force (cm H2O): 0 (01/21/24 1119)  F/VT Ratio<105 (RSBI): (!) 39.13 (01/21/24 1119)    Lines/Drains/Airways       Central Venous Catheter Line  Duration                  Hemodialysis Catheter right subclavian -- days              Drain  Duration                  NG/OG Tube 01/15/24 1333 orogastric Center mouth 5 days         Urethral Catheter 01/15/24 1332 Latex;Double-lumen 16 Fr. 5 days              Airway  Duration                  Airway - Non-Surgical 01/15/24 Endotracheal Tube 6 days              Peripheral Intravenous Line  Duration                  Peripheral IV - Single Lumen 01/15/24 1302 18 G Left Antecubital 5 days         Peripheral IV - Single Lumen 01/15/24 1313 18 G Right Antecubital 5 days         Peripheral IV - Single Lumen 01/19/24 0115 20 G Anterior;Left Forearm 2 days                    Significant Labs:    CBC/Anemia Profile:  Recent Labs   Lab 01/20/24  0409 01/21/24  0332   WBC 21.08* 21.47*   HGB 10.2* 9.9*   HCT 32.2* 31.1*    358   MCV 93 93   RDW 15.2* 15.1*        Chemistries:  Recent Labs   Lab 01/20/24  0409 01/21/24  0332   * 135*   K 3.5 3.7    99   CO2 21* 20*   BUN 60* 86*   CREATININE 5.7* 6.7*   CALCIUM 8.9 8.4*   ALBUMIN 2.2* 2.3*   PROT 7.2 7.1   BILITOT 0.3 0.3   ALKPHOS 130 150*   ALT 24 29   AST 35 48*   MG 2.3 2.7*   PHOS 3.9 5.0*       All pertinent labs within the past 24 hours have been reviewed.    Significant Imaging:  I have reviewed all pertinent imaging results/findings within the past 24 hours.    ABG  Recent Labs   Lab 01/19/24  0401   PH 7.480*   PO2 85   PCO2 27.4*   HCO3 20.4*   BE -3*     Assessment/Plan:     Cardiac/Vascular  HTN (hypertension)  - holding home meds acutely  - will restart, as necessary    Cardiac arrest  - initially thought Cardiac most likely given runs of NSVT and good oxgenation, but now blood cultures are showing Staph (though no evidence of true septic  shock given stable hemodynamics)  - empiric Abx  - Cardiology following  - back on the amio gtt 1/19  - replete electrolytes cautiously given ESRD  - Echo with mildly decreased LVEF  - Cardiac monitoring  - avoid fever  - initial CT Head unremarkable  - EEG with severe diffuse encephalopathy    Renal/  ESRD (end stage renal disease)  - MWF via left chest vascath  - no acute indication  - Nephrology consulted  - tolerated dialysis 1/18  - renally dose meds and avoid nephrotoxins    ID  MRSA bacteremia  - Urine and Blood from 1/15 positive for MRSA  - repeat blood cultures 1/17 NGTD  - wound cultures 1/18 growing Staph  - continue Vanc, empiric Merrem  - consulted ID  - hemodynamics stable    Influenza B  - likely an incidental finding given her excellent oxygenation  - will hold off on Tamiflu given ESRD  - Droplet precautions  - monitor    Chronic osteomyelitis  - WOCN consulted  - best I can tell, not on any chronic Abx for this.  It seems she gets spot treated here and there and follows with outpt Wound Care regularly  - blood Cx 1/15 now with MRSA  - getting broad spectrum Abx  - repeat Cx pending  - ID following    Endocrine  Type 2 diabetes mellitus with kidney complication, with long-term current use of insulin  - SSI/Accuchecks      Critical Care Time: 36 minutes  Critical secondary to respiratory failure, cardiac arrest      Critical care was time spent personally by me on the following activities: development of treatment plan with patient or surrogate and bedside caregivers, discussions with consultants, evaluation of patient's response to treatment, examination of patient, ordering and performing treatments and interventions, ordering and review of laboratory studies, ordering and review of radiographic studies, pulse oximetry, re-evaluation of patient's condition. This critical care time did not overlap with that of any other provider or involve time for any procedures.     David Barrios,  MD  Critical Care Medicine  [facility] - Intensive Care ([facility])

## 2024-01-22 LAB
ALBUMIN SERPL BCP-MCNC: 2.1 G/DL (ref 3.5–5.2)
ALLENS TEST: ABNORMAL
ALLENS TEST: ABNORMAL
ALP SERPL-CCNC: 145 U/L (ref 55–135)
ALT SERPL W/O P-5'-P-CCNC: 27 U/L (ref 10–44)
ANION GAP SERPL CALC-SCNC: 17 MMOL/L (ref 8–16)
AST SERPL-CCNC: 44 U/L (ref 10–40)
BACTERIA BLD CULT: NORMAL
BACTERIA BLD CULT: NORMAL
BACTERIA SPEC AEROBE CULT: ABNORMAL
BASOPHILS # BLD AUTO: 0.06 K/UL (ref 0–0.2)
BASOPHILS NFR BLD: 0.3 % (ref 0–1.9)
BILIRUB SERPL-MCNC: 0.3 MG/DL (ref 0.1–1)
BUN SERPL-MCNC: 112 MG/DL (ref 8–23)
CALCIUM SERPL-MCNC: 8.3 MG/DL (ref 8.7–10.5)
CHLORIDE SERPL-SCNC: 99 MMOL/L (ref 95–110)
CO2 SERPL-SCNC: 18 MMOL/L (ref 23–29)
CREAT SERPL-MCNC: 7.3 MG/DL (ref 0.5–1.4)
DELSYS: ABNORMAL
DELSYS: ABNORMAL
DIFFERENTIAL METHOD BLD: ABNORMAL
EOSINOPHIL # BLD AUTO: 0.4 K/UL (ref 0–0.5)
EOSINOPHIL NFR BLD: 2.3 % (ref 0–8)
ERYTHROCYTE [DISTWIDTH] IN BLOOD BY AUTOMATED COUNT: 14.8 % (ref 11.5–14.5)
ERYTHROCYTE [SEDIMENTATION RATE] IN BLOOD BY WESTERGREN METHOD: 16 MM/H
ERYTHROCYTE [SEDIMENTATION RATE] IN BLOOD BY WESTERGREN METHOD: 16 MM/H
EST. GFR  (NO RACE VARIABLE): 6 ML/MIN/1.73 M^2
FIO2: 30
FIO2: 30
GLUCOSE SERPL-MCNC: 164 MG/DL (ref 70–110)
HAV IGM SERPL QL IA: NORMAL
HBV CORE IGM SERPL QL IA: NORMAL
HBV SURFACE AG SERPL QL IA: NORMAL
HCO3 UR-SCNC: 20.2 MMOL/L (ref 24–28)
HCO3 UR-SCNC: 20.3 MMOL/L (ref 24–28)
HCT VFR BLD AUTO: 30 % (ref 37–48.5)
HCV AB SERPL QL IA: NORMAL
HGB BLD-MCNC: 9.7 G/DL (ref 12–16)
IMM GRANULOCYTES # BLD AUTO: 0.17 K/UL (ref 0–0.04)
IMM GRANULOCYTES NFR BLD AUTO: 0.9 % (ref 0–0.5)
LYMPHOCYTES # BLD AUTO: 1.7 K/UL (ref 1–4.8)
LYMPHOCYTES NFR BLD: 9.1 % (ref 18–48)
MAGNESIUM SERPL-MCNC: 2.9 MG/DL (ref 1.6–2.6)
MCH RBC QN AUTO: 30 PG (ref 27–31)
MCHC RBC AUTO-ENTMCNC: 32.3 G/DL (ref 32–36)
MCV RBC AUTO: 93 FL (ref 82–98)
MODE: ABNORMAL
MODE: ABNORMAL
MONOCYTES # BLD AUTO: 1.5 K/UL (ref 0.3–1)
MONOCYTES NFR BLD: 8.1 % (ref 4–15)
NEUTROPHILS # BLD AUTO: 15 K/UL (ref 1.8–7.7)
NEUTROPHILS NFR BLD: 79.3 % (ref 38–73)
NRBC BLD-RTO: 0 /100 WBC
PCO2 BLDA: 30.2 MMHG (ref 35–45)
PCO2 BLDA: 31.3 MMHG (ref 35–45)
PEEP: 5
PEEP: 5
PH SMN: 7.42 [PH] (ref 7.35–7.45)
PH SMN: 7.43 [PH] (ref 7.35–7.45)
PHOSPHATE SERPL-MCNC: 6.5 MG/DL (ref 2.7–4.5)
PLATELET # BLD AUTO: 331 K/UL (ref 150–450)
PLATELET BLD QL SMEAR: ABNORMAL
PMV BLD AUTO: 9.5 FL (ref 9.2–12.9)
PO2 BLDA: 114 MMHG (ref 80–100)
PO2 BLDA: 95 MMHG (ref 80–100)
POC BE: -4 MMOL/L
POC BE: -4 MMOL/L
POC SATURATED O2: 98 % (ref 95–100)
POC SATURATED O2: 99 % (ref 95–100)
POCT GLUCOSE: 140 MG/DL (ref 70–110)
POCT GLUCOSE: 141 MG/DL (ref 70–110)
POCT GLUCOSE: 158 MG/DL (ref 70–110)
POCT GLUCOSE: 171 MG/DL (ref 70–110)
POCT GLUCOSE: 212 MG/DL (ref 70–110)
POTASSIUM SERPL-SCNC: 3.8 MMOL/L (ref 3.5–5.1)
PROT SERPL-MCNC: 6.7 G/DL (ref 6–8.4)
RBC # BLD AUTO: 3.23 M/UL (ref 4–5.4)
SAMPLE: ABNORMAL
SAMPLE: ABNORMAL
SITE: ABNORMAL
SITE: ABNORMAL
SODIUM SERPL-SCNC: 134 MMOL/L (ref 136–145)
VANCOMYCIN SERPL-MCNC: 19 UG/ML
VT: 450
VT: 450
WBC # BLD AUTO: 18.96 K/UL (ref 3.9–12.7)

## 2024-01-22 PROCEDURE — 83735 ASSAY OF MAGNESIUM: CPT | Performed by: INTERNAL MEDICINE

## 2024-01-22 PROCEDURE — 82803 BLOOD GASES ANY COMBINATION: CPT

## 2024-01-22 PROCEDURE — 94761 N-INVAS EAR/PLS OXIMETRY MLT: CPT | Mod: XB

## 2024-01-22 PROCEDURE — 80074 ACUTE HEPATITIS PANEL: CPT | Performed by: INTERNAL MEDICINE

## 2024-01-22 PROCEDURE — 25000003 PHARM REV CODE 250: Performed by: INTERNAL MEDICINE

## 2024-01-22 PROCEDURE — 27000207 HC ISOLATION

## 2024-01-22 PROCEDURE — 20000000 HC ICU ROOM

## 2024-01-22 PROCEDURE — 25000003 PHARM REV CODE 250: Performed by: NURSE PRACTITIONER

## 2024-01-22 PROCEDURE — 86706 HEP B SURFACE ANTIBODY: CPT | Performed by: INTERNAL MEDICINE

## 2024-01-22 PROCEDURE — 99900026 HC AIRWAY MAINTENANCE (STAT)

## 2024-01-22 PROCEDURE — 99900035 HC TECH TIME PER 15 MIN (STAT)

## 2024-01-22 PROCEDURE — 80202 ASSAY OF VANCOMYCIN: CPT | Performed by: INTERNAL MEDICINE

## 2024-01-22 PROCEDURE — 63600175 PHARM REV CODE 636 W HCPCS

## 2024-01-22 PROCEDURE — 36415 COLL VENOUS BLD VENIPUNCTURE: CPT | Performed by: INTERNAL MEDICINE

## 2024-01-22 PROCEDURE — 63600175 PHARM REV CODE 636 W HCPCS: Performed by: PHYSICIAN ASSISTANT

## 2024-01-22 PROCEDURE — 63600175 PHARM REV CODE 636 W HCPCS: Performed by: INTERNAL MEDICINE

## 2024-01-22 PROCEDURE — 80053 COMPREHEN METABOLIC PANEL: CPT | Performed by: INTERNAL MEDICINE

## 2024-01-22 PROCEDURE — 27100171 HC OXYGEN HIGH FLOW UP TO 24 HOURS

## 2024-01-22 PROCEDURE — 94003 VENT MGMT INPAT SUBQ DAY: CPT

## 2024-01-22 PROCEDURE — 25000003 PHARM REV CODE 250: Performed by: PHYSICIAN ASSISTANT

## 2024-01-22 PROCEDURE — 84100 ASSAY OF PHOSPHORUS: CPT | Performed by: INTERNAL MEDICINE

## 2024-01-22 PROCEDURE — 36600 WITHDRAWAL OF ARTERIAL BLOOD: CPT

## 2024-01-22 PROCEDURE — 85025 COMPLETE CBC W/AUTO DIFF WBC: CPT | Performed by: INTERNAL MEDICINE

## 2024-01-22 RX ADMIN — HEPARIN SODIUM 5000 UNITS: 5000 INJECTION INTRAVENOUS; SUBCUTANEOUS at 01:01

## 2024-01-22 RX ADMIN — FAMOTIDINE 20 MG: 40 POWDER, FOR SUSPENSION ORAL at 09:01

## 2024-01-22 RX ADMIN — ASPIRIN 81 MG CHEWABLE TABLET 81 MG: 81 TABLET CHEWABLE at 09:01

## 2024-01-22 RX ADMIN — INSULIN ASPART 3 UNITS: 100 INJECTION, SOLUTION INTRAVENOUS; SUBCUTANEOUS at 04:01

## 2024-01-22 RX ADMIN — HEPARIN SODIUM 5000 UNITS: 5000 INJECTION INTRAVENOUS; SUBCUTANEOUS at 06:01

## 2024-01-22 RX ADMIN — METOPROLOL TARTRATE 50 MG: 50 TABLET, FILM COATED ORAL at 09:01

## 2024-01-22 RX ADMIN — INSULIN ASPART 3 UNITS: 100 INJECTION, SOLUTION INTRAVENOUS; SUBCUTANEOUS at 10:01

## 2024-01-22 RX ADMIN — CHLORHEXIDINE GLUCONATE 0.12% ORAL RINSE 15 ML: 1.2 LIQUID ORAL at 10:01

## 2024-01-22 RX ADMIN — PRAVASTATIN SODIUM 40 MG: 20 TABLET ORAL at 09:01

## 2024-01-22 RX ADMIN — AMIODARONE HYDROCHLORIDE 0.5 MG/MIN: 1.8 INJECTION, SOLUTION INTRAVENOUS at 03:01

## 2024-01-22 RX ADMIN — HEPARIN SODIUM 5000 UNITS: 5000 INJECTION INTRAVENOUS; SUBCUTANEOUS at 09:01

## 2024-01-22 RX ADMIN — INSULIN ASPART 6 UNITS: 100 INJECTION, SOLUTION INTRAVENOUS; SUBCUTANEOUS at 01:01

## 2024-01-22 RX ADMIN — AMIODARONE HYDROCHLORIDE 0.5 MG/MIN: 1.8 INJECTION, SOLUTION INTRAVENOUS at 01:01

## 2024-01-22 RX ADMIN — MEROPENEM 500 MG: 500 INJECTION INTRAVENOUS at 08:01

## 2024-01-22 RX ADMIN — CHLORHEXIDINE GLUCONATE 0.12% ORAL RINSE 15 ML: 1.2 LIQUID ORAL at 09:01

## 2024-01-22 RX ADMIN — VANCOMYCIN HYDROCHLORIDE 500 MG: 500 INJECTION, POWDER, LYOPHILIZED, FOR SOLUTION INTRAVENOUS at 02:01

## 2024-01-22 NOTE — PROGRESS NOTES
Pharmacokinetic Assessment Follow Up: IV Vancomycin    Vancomycin serum concentration assessment(s):  Vancomycin random level @ 0406 = 19.0 mcg/ml (therapeutic) -- collected ~45.5 hours after 500 mg dose given 1/20 AM    Vancomycin Regimen Plan:  Since level < 20 mcg/ml, will re-dose with 500 mg x 1 now  48-hour random level due 1/24 @ 1400  We will continue to re-dose for levels < 20 mcg/ml    Drug levels (last 3 results):  Recent Labs   Lab Result Units 01/20/24  0409 01/22/24  0406   Vancomycin, Random ug/mL 17.2 19.0       Pharmacy will continue to follow and monitor vancomycin.    Please contact pharmacy at extension [phone] for questions regarding this assessment.    Thank you for the consult,   Katherine McArdle, Pharm.D. 1/22/2024 12:28 PM         Patient brief summary:  Sydney Lejeune is a 73 y.o. female initiated on antimicrobial therapy with IV Vancomycin for treatment of  MRSA UTI, bacteremia, & chronic osteomyelitis     The patient's current regimen is pulse dosing PRN based on random levels     Drug Allergies:   Review of patient's allergies indicates:   Allergen Reactions    Tetanus vaccines and toxoid Nausea And Vomiting    Codeine Other (See Comments)     Upset stomach       Actual Body Weight:   103.6 kg    Renal Function:   Estimated Creatinine Clearance: 8.7 mL/min (A) (based on SCr of 7.3 mg/dL (H)). -- trending up since HD on hold    Dialysis Method (if applicable):  intermittent HD -- currently on hold for the foreseeable future     CBC (last 72 hours):  Recent Labs   Lab Result Units 01/20/24  0409 01/21/24  0332 01/22/24  0406   WBC K/uL 21.08* 21.47* 18.96*   Hemoglobin g/dL 10.2* 9.9* 9.7*   Hematocrit % 32.2* 31.1* 30.0*   Platelets K/uL 348 358 331   Gran % % 83.7* 81.5* 79.3*   Lymph % % 6.3* 7.8* 9.1*   Mono % % 8.1 8.5 8.1   Eosinophil % % 0.8 1.3 2.3   Basophil % % 0.2 0.2 0.3   Differential Method  Automated Automated Automated       Metabolic Panel (last 72 hours):  Recent Labs    Lab Result Units 01/20/24  0409 01/21/24  0332 01/22/24  0406   Sodium mmol/L 135* 135* 134*   Potassium mmol/L 3.5 3.7 3.8   Chloride mmol/L 102 99 99   CO2 mmol/L 21* 20* 18*   Glucose mg/dL 173* 156* 164*   BUN mg/dL 60* 86* 112*   Creatinine mg/dL 5.7* 6.7* 7.3*   Albumin g/dL 2.2* 2.3* 2.1*   Total Bilirubin mg/dL 0.3 0.3 0.3   Alkaline Phosphatase U/L 130 150* 145*   AST U/L 35 48* 44*   ALT U/L 24 29 27   Magnesium mg/dL 2.3 2.7* 2.9*   Phosphorus mg/dL 3.9 5.0* 6.5*       Vancomycin Administrations:  vancomycin given in the last 96 hours                     vancomycin (VANCOCIN) 500 mg in dextrose 5 % in water (D5W) 100 mL IVPB (MB+) (mg) 500 mg New Bag 01/20/24 0626    vancomycin (VANCOCIN) 500 mg in dextrose 5 % in water (D5W) 100 mL IVPB (MB+) (mg) 500 mg New Bag 01/18/24 1812                    Microbiologic Results:  Microbiology Results (last 7 days)       Procedure Component Value Units Date/Time    Aerobic culture [[id number]]  (Abnormal)  (Susceptibility) Collected: 01/18/24 1346    Order Status: Completed Specimen: Wound from Foot, Left Updated: 01/22/24 0733     Aerobic Bacterial Culture METHICILLIN RESISTANT STAPHYLOCOCCUS AUREUS  Moderate      Blood culture [[id number]] Collected: 01/17/24 0909    Order Status: Completed Specimen: Blood from Antecubital, Right Hand Updated: 01/21/24 2012     Blood Culture, Routine No Growth to date      No Growth to date      No Growth to date      No Growth to date      No Growth to date    Blood culture [[id number]] Collected: 01/17/24 0909    Order Status: Completed Specimen: Blood Updated: 01/21/24 2012     Blood Culture, Routine No Growth to date      No Growth to date      No Growth to date      No Growth to date      No Growth to date    Culture, Anaerobe [[id number]] Collected: 01/18/24 1346    Order Status: Completed Specimen: Wound from Foot, Left Updated: 01/20/24 1451     Anaerobic Culture Culture in progress    Urine culture [[id number]]   (Abnormal)  (Susceptibility) Collected: 01/15/24 1311    Order Status: Completed Specimen: Urine Updated: 01/18/24 1224     Urine Culture, Routine METHICILLIN RESISTANT STAPHYLOCOCCUS AUREUS  > 100,000 cfu/ml      Narrative:      Specimen Source->Urine    Blood culture x two cultures. Draw prior to antibiotics. [[id number]]  (Abnormal) Collected: 01/15/24 1308    Order Status: Completed Specimen: Blood from Peripheral, Antecubital, Left Updated: 01/18/24 1014     Blood Culture, Routine Gram stain aer bottle: Gram positive cocci in clusters resembling Staph      Results called to and read back by: Gladis Ricci RN 01/16/2024  08:16      Gram stain olegario bottle: Gram positive cocci in clusters resembling Staph      Positive results previously called 01/16/2024  10:29      METHICILLIN RESISTANT STAPHYLOCOCCUS AUREUS  ID consult required at [facility].  For susceptibility see order #[id number]      Narrative:      Aerobic and anaerobic    Blood culture x two cultures. Draw prior to antibiotics. [[id number]]  (Abnormal)  (Susceptibility) Collected: 01/15/24 1309    Order Status: Completed Specimen: Blood from Peripheral, Antecubital, Right Updated: 01/18/24 1012     Blood Culture, Routine Gram stain aer bottle: Gram positive cocci in clusters resembling Staph      Results called to and read back by: Gladis Ricci RN 01/16/2024  08:16      Gram stain olegario bottle: Gram positive cocci in clusters resembling Staph      Positive results previously called 01/16/2024  10:29      METHICILLIN RESISTANT STAPHYLOCOCCUS AUREUS  ID consult required at [facility].      Narrative:      Aerobic and anaerobic    Rapid Organism ID by PCR (from Blood culture) [[id number]]  (Abnormal) Collected: 01/15/24 1309    Order Status: Completed Updated: 01/16/24 0931     Enterococcus faecalis Not Detected     Enterococcus faecium Not Detected     Listeria monocytogenes Not Detected      Staphylococcus spp. See species for ID     Staphylococcus aureus Detected     Staphylococcus epidermidis Not Detected     Staphylococcus lugdunensis Not Detected     Streptococcus species Not Detected     Streptococcus agalactiae Not Detected     Streptococcus pneumoniae Not Detected     Streptococcus pyogenes Not Detected     Acinetobacter calcoaceticus/baumannii complex Not Detected     Bacteroides fragilis Not Detected     Enterobacterales Not Detected     Enterobacter cloacae complex Not Detected     Escherichia coli Not Detected     Klebsiella aerogenes Not Detected     Klebsiella oxytoca Not Detected     Klebsiella pneumoniae group Not Detected     Proteus Not Detected     Salmonella sp Not Detected     Serratia marcescens Not Detected     Haemophilus influenzae Not Detected     Neisseria meningtidis Not Detected     Pseudomonas aeruginosa Not Detected     Stenotrophomonas maltophilia Not Detected     Candida albicans Not Detected     Candida auris Not Detected     Candida glabrata Not Detected     Candida krusei Not Detected     Candida parapsilosis Not Detected     Candida tropicalis Not Detected     Cryptococcus neoformans/gattii Not Detected     CTX-M (ESBL ) Test Not Applicable     IMP (Carbapenem resistant) Test Not Applicable     KPC resistance gene (Carbapenem resistant) Test Not Applicable     mcr-1  Test Not Applicable     mec A/C  Test Not Applicable     mec A/C and MREJ (MRSA) gene Detected     NDM (Carbapenem resistant) Test Not Applicable     OXA-48-like (Carbapenem resistant) Test Not Applicable     van A/B (VRE gene) Test Not Applicable     VIM (Carbapenem resistant) Test Not Applicable    Narrative:      Aerobic and anaerobic    Influenza A & B by Molecular [[id number]]  (Abnormal) Collected: 01/15/24 1311    Order Status: Completed Specimen: Nasopharyngeal Swab Updated: 01/15/24 1341     Influenza A, Molecular Negative     Influenza B, Molecular Positive     Flu A & B Source Nasal  swab

## 2024-01-22 NOTE — ASSESSMENT & PLAN NOTE
- WOCN consulted  - best I can tell, not on any chronic Abx for this.  It seems she gets spot treated here and there and follows with outpt Wound Care regularly  - blood Cx 1/15 now with MRSA  - getting broad spectrum Abx  - repeat Cx NGTD  - ID following

## 2024-01-22 NOTE — ASSESSMENT & PLAN NOTE
- likely an incidental finding given her excellent oxygenation  - held off on Tamiflu given ESRD  - Droplet precautions  - monitor   27-Nov-2019

## 2024-01-22 NOTE — SUBJECTIVE & OBJECTIVE
Interval History: No acute events overnight.  Back on AC/VC overnight.  Neuro exam unchanged.      Objective:     Vital Signs (Most Recent):  Temp: 98 °F (36.7 °C) (01/22/24 0305)  Pulse: 64 (01/22/24 0506)  Resp: 17 (01/22/24 0506)  BP: (!) 160/70 (01/22/24 0505)  SpO2: 99 % (01/22/24 0506) Vital Signs (24h Range):  Temp:  [97.6 °F (36.4 °C)-99.1 °F (37.3 °C)] 98 °F (36.7 °C)  Pulse:  [62-72] 64  Resp:  [10-27] 17  SpO2:  [87 %-100 %] 99 %  BP: (124-168)/(58-77) 160/70     Weight: 103.6 kg (228 lb 6.3 oz)  Body mass index is 35.77 kg/m².      Intake/Output Summary (Last 24 hours) at 1/22/2024 0849  Last data filed at 1/22/2024 0300  Gross per 24 hour   Intake 556.67 ml   Output 215 ml   Net 341.67 ml        Physical Exam  Vitals and nursing note reviewed.   Constitutional:       General: She is not in acute distress.     Comments: Intubated, no sedation   Cardiovascular:      Rate and Rhythm: Normal rate and regular rhythm.      Pulses: Normal pulses.      Heart sounds: No murmur heard.  Pulmonary:      Effort: Pulmonary effort is normal. No respiratory distress.      Breath sounds: No wheezing, rhonchi or rales.   Abdominal:      General: Abdomen is flat. There is no distension.      Palpations: Abdomen is soft.      Tenderness: There is no abdominal tenderness.   Musculoskeletal:      Right lower leg: Edema present.      Left lower leg: Edema present.   Neurological:      Comments: Opens eyes spontaneously, some grimmace with pain but no withdrawal, no cough, pupils reactive           Review of Systems    Vents:  Vent Mode: A/C (01/22/24 0506)  Set Rate: 16 BPM (01/22/24 0506)  Vt Set: 450 mL (01/22/24 0506)  Pressure Support: 7 cmH20 (01/21/24 1526)  PEEP/CPAP: 5 cmH20 (01/22/24 0506)  Oxygen Concentration (%): 30 (01/22/24 0506)  Peak Airway Pressure: 21 cmH20 (01/22/24 0506)  Plateau Pressure: 18 cmH20 (01/22/24 0506)  Total Ve: 7.93 L/m (01/22/24 0506)  Negative Inspiratory Force (cm H2O): 0 (01/22/24  0506)  F/VT Ratio<105 (RSBI): (!) 36.56 (01/22/24 0506)    Lines/Drains/Airways       Central Venous Catheter Line  Duration                  Hemodialysis Catheter right subclavian -- days              Drain  Duration                  NG/OG Tube 01/15/24 1333 orogastric Center mouth 6 days         Urethral Catheter 01/15/24 1332 Latex;Double-lumen 16 Fr. 6 days         Rectal Tube 01/21/24 1830 <1 day              Airway  Duration                  Airway - Non-Surgical 01/15/24 Endotracheal Tube 7 days              Peripheral Intravenous Line  Duration                  Peripheral IV - Single Lumen 01/15/24 1302 18 G Left Antecubital 6 days         Peripheral IV - Single Lumen 01/15/24 1313 18 G Right Antecubital 6 days         Peripheral IV - Single Lumen 01/19/24 0115 20 G Anterior;Left Forearm 3 days                    Significant Labs:    CBC/Anemia Profile:  Recent Labs   Lab 01/21/24  0332 01/22/24  0406   WBC 21.47* 18.96*   HGB 9.9* 9.7*   HCT 31.1* 30.0*    331   MCV 93 93   RDW 15.1* 14.8*        Chemistries:  Recent Labs   Lab 01/21/24  0332 01/22/24  0406   * 134*   K 3.7 3.8   CL 99 99   CO2 20* 18*   BUN 86* 112*   CREATININE 6.7* 7.3*   CALCIUM 8.4* 8.3*   ALBUMIN 2.3* 2.1*   PROT 7.1 6.7   BILITOT 0.3 0.3   ALKPHOS 150* 145*   ALT 29 27   AST 48* 44*   MG 2.7* 2.9*   PHOS 5.0* 6.5*       All pertinent labs within the past 24 hours have been reviewed.    Significant Imaging:  I have reviewed all pertinent imaging results/findings within the past 24 hours.

## 2024-01-22 NOTE — NURSING
Mercedes with [facility] communicated pt is not a DCD candidate. [facility] to be updated with any change of status for further screening

## 2024-01-22 NOTE — CONSULTS
[facility] - Intensive Care ([facility])  Wound Care    Patient Name:  Sydney Lejeune   MRN:  [id number]  Date: 1/22/2024  Diagnosis: <principal problem not specified>    History:     Past Medical History:   Diagnosis Date    Diabetes mellitus     Diabetes mellitus with stage 4 chronic kidney disease     Hypertension     Kidney stones     MRSA (methicillin resistant staph aureus) culture positive     Renal disorder        Social History     Socioeconomic History    Marital status:    Tobacco Use    Smoking status: Former   Substance and Sexual Activity    Alcohol use: No    Drug use: No    Sexual activity: Yes     Social Determinants of Health     Financial Resource Strain: Patient Unable To Answer (1/17/2024)    Overall Financial Resource Strain (CARDIA)     Difficulty of Paying Living Expenses: Patient unable to answer   Food Insecurity: Patient Unable To Answer (1/17/2024)    Hunger Vital Sign     Worried About Running Out of Food in the Last Year: Patient unable to answer     Ran Out of Food in the Last Year: Patient unable to answer   Transportation Needs: Patient Unable To Answer (1/17/2024)    PRAPARE - Transportation     Lack of Transportation (Medical): Patient unable to answer     Lack of Transportation (Non-Medical): Patient unable to answer   Stress: Patient Unable To Answer (1/17/2024)    [employer] - Occupational Stress Questionnaire     Feeling of Stress : Patient unable to answer   Housing Stability: Patient Unable To Answer (1/17/2024)    Housing Stability Vital Sign     Unable to Pay for Housing in the Last Year: Patient unable to answer     Unstable Housing in the Last Year: Patient unable to answer       Precautions:     Allergies as of 01/15/2024 - Unable to Assess 01/15/2024   Allergen Reaction Noted    Tetanus vaccines and toxoid Nausea And Vomiting 06/08/2014    Codeine Other (See Comments) 09/12/2015       WO Assessment Details/Treatment     New consult noted on  Ms. Lejeune due to wounds to lips and tongue. Patient remains in ICU, intubated. Skin tears to bilateral forearms with foam dressing in place, not changed at this visit as not due to be changed.  Lips assessed with no wounds or breakdown to lips noted. Ulceration is noted to underside of distal tongue appears related to biting - recommend good oral care, attempted to move tongue away from teeth at this visit.  Sacrum remains intact with no redness, prevnetative foam dressing in place.  Left plantar foot neuropathic ulceration again noted, remains unchanged from prior visit, dressing changed per orders.       01/22/24 0945   WOCN Assessment   WOCN Total Time (mins) 30   Visit Date 01/22/24   Visit Time 0945   Consult Type Follow Up;New   WOCN Speciality Wound   Wound neuropathic;skin tear;At risk for pressure Injury   Intervention assessed;changed;chart review;coordination of care;team conference;orders        Altered Skin Integrity 01/15/24 1900 Left plantar Foot Diabetic Ulcer   Date First Assessed/Time First Assessed: 01/15/24 1900   Altered Skin Integrity Present on Admission - Did Patient arrive to the hospital with altered skin?: yes  Side: Left  Orientation: plantar  Location: Foot  Primary Wound Type: Diabetic Ulcer   Wound Image    Dressing Appearance Intact;Moist drainage   Drainage Amount Scant   Drainage Characteristics/Odor Serous   Appearance Red;Moist   Tissue loss description Full thickness   Periwound Area Intact;Dry   Wound Edges Open   Wound Length (cm) 0.5 cm   Wound Width (cm) 0.5 cm   Wound Depth (cm) 2 cm   Wound Volume (cm^3) 0.5 cm^3   Wound Surface Area (cm^2) 0.25 cm^2   Care Cleansed with:;Wound cleanser;Applied:;Skin Barrier   Dressing Hydrofiber;Silver;Foam;Changed   Packing packing removed;packed with;hydrofiber/alginate   Packing Inserted  1   Dressing Change Due 01/25/24       Recommendations made to primary team for ongoing wound care per orders, pressure injury prevnetion  interventions, good oral care. Orders placed.     01/22/2024

## 2024-01-22 NOTE — PLAN OF CARE
Pt remains intubated. VSS, PEEP 5, FiO2 40%. Sinus rhythm on heart monitor. Tube feedings at goal rate, pt is tolerating well. Pt turned q2h for skin breakdown prevention. Bed is locked in lowest position, side rails up x2, bed alarm set.  Temp:  [98.3 °F (36.8 °C)-98.5 °F (36.9 °C)]   Pulse:  [64-71]   Resp:  [5-27]   BP: (133-168)/(60-77)   SpO2:  [90 %-100 %]      Problem: Infection  Goal: Absence of Infection Signs and Symptoms  Outcome: Ongoing, Progressing     Problem: Adult Inpatient Plan of Care  Goal: Plan of Care Review  Outcome: Ongoing, Progressing  Goal: Patient-Specific Goal (Individualized)  Outcome: Ongoing, Progressing  Goal: Absence of Hospital-Acquired Illness or Injury  Outcome: Ongoing, Progressing  Goal: Optimal Comfort and Wellbeing  Outcome: Ongoing, Progressing  Goal: Readiness for Transition of Care  Outcome: Ongoing, Progressing     Problem: Diabetes Comorbidity  Goal: Blood Glucose Level Within Targeted Range  Outcome: Ongoing, Progressing     Problem: Impaired Wound Healing  Goal: Optimal Wound Healing  Outcome: Ongoing, Progressing     Problem: Skin Injury Risk Increased  Goal: Skin Health and Integrity  Outcome: Ongoing, Progressing     Problem: Fall Injury Risk  Goal: Absence of Fall and Fall-Related Injury  Outcome: Ongoing, Progressing     Problem: Device-Related Complication Risk (Hemodialysis)  Goal: Safe, Effective Therapy Delivery  Outcome: Ongoing, Progressing     Problem: Hemodynamic Instability (Hemodialysis)  Goal: Effective Tissue Perfusion  Outcome: Ongoing, Progressing     Problem: Infection (Hemodialysis)  Goal: Absence of Infection Signs and Symptoms  Outcome: Ongoing, Progressing     Problem: Adjustment to Illness (Sepsis/Septic Shock)  Goal: Optimal Coping  Outcome: Ongoing, Progressing     Problem: Bleeding (Sepsis/Septic Shock)  Goal: Absence of Bleeding  Outcome: Ongoing, Progressing     Problem: Glycemic Control Impaired (Sepsis/Septic Shock)  Goal: Blood Glucose  Level Within Desired Range  Outcome: Ongoing, Progressing     Problem: Infection Progression (Sepsis/Septic Shock)  Goal: Absence of Infection Signs and Symptoms  Outcome: Ongoing, Progressing     Problem: Nutrition Impaired (Sepsis/Septic Shock)  Goal: Optimal Nutrition Intake  Outcome: Ongoing, Progressing

## 2024-01-22 NOTE — PROGRESS NOTES
[facility] - Intensive Care ([facility])  Critical Care Medicine  Progress Note    Patient Name: Sydney Lejeune  MRN: [id number]  Admission Date: 1/15/2024  Hospital Length of Stay: 7 days  Code Status: Full Code  Attending Provider: David Barrios MD  Primary Care Provider: No, Primary Doctor   Principal Problem: <principal problem not specified>    Subjective:     HPI:  Ms Lejeune is a 74 y/o WF with ESRD, DM, HTN, and chronic left foot wound with osteo presents via EMS to the ER today after being found slumped over her steering wheel at a gas station.  History is taken from the medical record as the patient is unable to provide history and no family available at the time of my exam.  On EMS arrival, she was pulseless and apneic.  ROSC achieved following 6 rounds of CPR (Epi x 1 and 300mg of Amio).  She was intubated in the ER upon arrival.  Hemodynamics stable and low/mod vent requirements but ongoign short runs of NSVT.  Cardiology consulted in the ER.  Currently on amio gtt and propofol.  Initial CT Head unremarkable.  Labs notable for Lactic of 8, WBC 23.89, Hgb 9.8, K 3.6, BNP 1466, Trop 0.043, positive Flu B.  ABG shows metabolic acidosis with good respiratory compensation and pO2 505.  She is admitted to the ICU for post-arrest care.    Hospital/ICU Course:  1/15 - admitted to ICU following out of hospital cardiac arrest.  Minimal vent and hemodynamics stable.  1/16 - No acute events.  Rhythm stable.  Vent and hemodynamics stable.  Blood Cx GS with GPC in clusters.  1/17 - No acute events.  Vent and hemodynamics stable.  Neuro exam largely unchanged.  1/18 - No acute events.  Vent and hemodynamics stable.  Neuro exam largely unchanged.  1/19 - No significant changes.  EEG showed severe diffuse encephalopathy.  1/20 - No significant changes.  Reports of some more L sided movement overnight, but nothing for me.  1/21 - Vent and hemodynamics stable.  Neuro exam unchanged.  Remains on PSV.  1/22 - vent and  hemodynamics stable.  Neuro exam unchanged.  Remains on PSV.    Interval History: No acute events overnight.  Back on AC/VC overnight.  Neuro exam unchanged.      Objective:     Vital Signs (Most Recent):  Temp: 98 °F (36.7 °C) (01/22/24 0305)  Pulse: 64 (01/22/24 0506)  Resp: 17 (01/22/24 0506)  BP: (!) 160/70 (01/22/24 0505)  SpO2: 99 % (01/22/24 0506) Vital Signs (24h Range):  Temp:  [97.6 °F (36.4 °C)-99.1 °F (37.3 °C)] 98 °F (36.7 °C)  Pulse:  [62-72] 64  Resp:  [10-27] 17  SpO2:  [87 %-100 %] 99 %  BP: (124-168)/(58-77) 160/70     Weight: 103.6 kg (228 lb 6.3 oz)  Body mass index is 35.77 kg/m².      Intake/Output Summary (Last 24 hours) at 1/22/2024 0849  Last data filed at 1/22/2024 0300  Gross per 24 hour   Intake 556.67 ml   Output 215 ml   Net 341.67 ml        Physical Exam  Vitals and nursing note reviewed.   Constitutional:       General: She is not in acute distress.     Comments: Intubated, no sedation   Cardiovascular:      Rate and Rhythm: Normal rate and regular rhythm.      Pulses: Normal pulses.      Heart sounds: No murmur heard.  Pulmonary:      Effort: Pulmonary effort is normal. No respiratory distress.      Breath sounds: No wheezing, rhonchi or rales.   Abdominal:      General: Abdomen is flat. There is no distension.      Palpations: Abdomen is soft.      Tenderness: There is no abdominal tenderness.   Musculoskeletal:      Right lower leg: Edema present.      Left lower leg: Edema present.   Neurological:      Comments: Opens eyes spontaneously, some grimmace with pain but no withdrawal, no cough, pupils reactive           Review of Systems    Vents:  Vent Mode: A/C (01/22/24 0506)  Set Rate: 16 BPM (01/22/24 0506)  Vt Set: 450 mL (01/22/24 0506)  Pressure Support: 7 cmH20 (01/21/24 1526)  PEEP/CPAP: 5 cmH20 (01/22/24 0506)  Oxygen Concentration (%): 30 (01/22/24 0506)  Peak Airway Pressure: 21 cmH20 (01/22/24 0506)  Plateau Pressure: 18 cmH20 (01/22/24 0506)  Total Ve: 7.93 L/m (01/22/24  0506)  Negative Inspiratory Force (cm H2O): 0 (01/22/24 0506)  F/VT Ratio<105 (RSBI): (!) 36.56 (01/22/24 0506)    Lines/Drains/Airways       Central Venous Catheter Line  Duration                  Hemodialysis Catheter right subclavian -- days              Drain  Duration                  NG/OG Tube 01/15/24 1333 orogastric Center mouth 6 days         Urethral Catheter 01/15/24 1332 Latex;Double-lumen 16 Fr. 6 days         Rectal Tube 01/21/24 1830 <1 day              Airway  Duration                  Airway - Non-Surgical 01/15/24 Endotracheal Tube 7 days              Peripheral Intravenous Line  Duration                  Peripheral IV - Single Lumen 01/15/24 1302 18 G Left Antecubital 6 days         Peripheral IV - Single Lumen 01/15/24 1313 18 G Right Antecubital 6 days         Peripheral IV - Single Lumen 01/19/24 0115 20 G Anterior;Left Forearm 3 days                    Significant Labs:    CBC/Anemia Profile:  Recent Labs   Lab 01/21/24  0332 01/22/24  0406   WBC 21.47* 18.96*   HGB 9.9* 9.7*   HCT 31.1* 30.0*    331   MCV 93 93   RDW 15.1* 14.8*        Chemistries:  Recent Labs   Lab 01/21/24  0332 01/22/24  0406   * 134*   K 3.7 3.8   CL 99 99   CO2 20* 18*   BUN 86* 112*   CREATININE 6.7* 7.3*   CALCIUM 8.4* 8.3*   ALBUMIN 2.3* 2.1*   PROT 7.1 6.7   BILITOT 0.3 0.3   ALKPHOS 150* 145*   ALT 29 27   AST 48* 44*   MG 2.7* 2.9*   PHOS 5.0* 6.5*       All pertinent labs within the past 24 hours have been reviewed.    Significant Imaging:  I have reviewed all pertinent imaging results/findings within the past 24 hours.    ABG  Recent Labs   Lab 01/22/24  0341   PH 7.435   PO2 114*   PCO2 30.2*   HCO3 20.3*   BE -4*     Assessment/Plan:     Cardiac/Vascular  Ventricular tachycardia  - runs of NSVT  - has been on/off amio gtt  - Cardiology following  - remains on amio @ 0.5 mg/min    HTN (hypertension)  - holding home meds acutely  - will restart, as necessary    Cardiac arrest  - initially thought  Cardiac most likely given runs of NSVT and good oxgenation, but now blood cultures are showing Staph (though no evidence of true septic shock given stable hemodynamics)  - empiric Abx  - Cardiology following  - back on the amio gtt 1/19  - replete electrolytes cautiously given ESRD  - Echo with mildly decreased LVEF  - Cardiac monitoring  - avoid fever  - initial CT Head unremarkable  - EEG with severe diffuse encephalopathy    Renal/  ESRD (end stage renal disease)  - MWF via left chest vascath  - no acute indication  - Nephrology consulted  - tolerated dialysis 1/18  - renally dose meds and avoid nephrotoxins    ID  MRSA bacteremia  - Urine and Blood from 1/15 positive for MRSA  - repeat blood cultures 1/17 NGTD  - wound cultures 1/18 growing Staph  - continue Vanc, empiric Merrem  - consulted ID  - hemodynamics stable    Influenza B  - likely an incidental finding given her excellent oxygenation  - held off on Tamiflu given ESRD  - Droplet precautions  - monitor    Chronic osteomyelitis  - WOCN consulted  - best I can tell, not on any chronic Abx for this.  It seems she gets spot treated here and there and follows with outpt Wound Care regularly  - blood Cx 1/15 now with MRSA  - getting broad spectrum Abx  - repeat Cx NGTD  - ID following    Endocrine  Type 2 diabetes mellitus with kidney complication, with long-term current use of insulin  - SSI/Accuchecks      Critical Care Time: 34 minutes  Critical secondary to respiratory failure, cardiac arrest      Critical care was time spent personally by me on the following activities: development of treatment plan with patient or surrogate and bedside caregivers, discussions with consultants, evaluation of patient's response to treatment, examination of patient, ordering and performing treatments and interventions, ordering and review of laboratory studies, ordering and review of radiographic studies, pulse oximetry, re-evaluation of patient's condition. This  critical care time did not overlap with that of any other provider or involve time for any procedures.     David Barrios MD  Critical Care Medicine  [facility] - Intensive Care [facility])

## 2024-01-22 NOTE — PLAN OF CARE
[facility] - Intensive Care (Hospital)  Discharge Reassessment    Primary Care Provider: No, Primary Doctor    Expected Discharge Date:     Reassessment (most recent)       Discharge Reassessment - 01/22/24 2088          Discharge Reassessment    Assessment Type Discharge Planning Reassessment     Did the patient's condition or plan change since previous assessment? No     Discharge Plan discussed with: Adult children     Communicated HELEN with patient/caregiver Date not available/Unable to determine     Discharge Plan A Other   TBD    Discharge Plan B Comfort care/withdrawal     Why the patient remains in the hospital Requires continued medical care                   Intubated, not on any sedation. Son should be arriving from the [place] today.

## 2024-01-22 NOTE — PROGRESS NOTES
Pharmacokinetic Assessment Follow Up: IV Vancomycin    Vancomycin serum concentration assessment(s):    The random level was drawn correctly and can be used to guide therapy at this time. The measurement is within the desired definitive target range of 15 to 20 mcg/mL.    Vancomycin Regimen Plan:    In the setting of rising serum creatinine, will hold off on dosing for now and monitor clearance. Next level to be drawn at 0430 on 01/23 with AM labs.     Drug levels (last 3 results):  Recent Labs   Lab Result Units 01/20/24  0409 01/22/24  0406   Vancomycin, Random ug/mL 17.2 19.0       Pharmacy will continue to follow and monitor vancomycin.    Please contact pharmacy at extension [phone] for questions regarding this assessment.    Thank you for the consult,   Sonya Dickson       Patient brief summary:  Sydney Lejeune is a 73 y.o. female initiated on antimicrobial therapy with IV Vancomycin for treatment of  MRSA bacteremia, UTI, & chronic osteomyelitis    The patient's current regimen is pulse dosing.    Drug Allergies:   Review of patient's allergies indicates:   Allergen Reactions    Tetanus vaccines and toxoid Nausea And Vomiting    Codeine Other (See Comments)     Upset stomach       Actual Body Weight:   103.6 kg    Renal Function:   Estimated Creatinine Clearance: 8.7 mL/min (A) (based on SCr of 7.3 mg/dL (H)).,     Dialysis Method (if applicable):  N/A - last HD session on 01/18/2024    CBC (last 72 hours):  Recent Labs   Lab Result Units 01/20/24  0409 01/21/24  0332 01/22/24  0406   WBC K/uL 21.08* 21.47* 18.96*   Hemoglobin g/dL 10.2* 9.9* 9.7*   Hematocrit % 32.2* 31.1* 30.0*   Platelets K/uL 348 358 331   Gran % % 83.7* 81.5* 79.3*   Lymph % % 6.3* 7.8* 9.1*   Mono % % 8.1 8.5 8.1   Eosinophil % % 0.8 1.3 2.3   Basophil % % 0.2 0.2 0.3   Differential Method  Automated Automated Automated       Metabolic Panel (last 72 hours):  Recent Labs   Lab Result Units 01/20/24  0409 01/21/24  0332 01/22/24  0406    Sodium mmol/L 135* 135* 134*   Potassium mmol/L 3.5 3.7 3.8   Chloride mmol/L 102 99 99   CO2 mmol/L 21* 20* 18*   Glucose mg/dL 173* 156* 164*   BUN mg/dL 60* 86* 112*   Creatinine mg/dL 5.7* 6.7* 7.3*   Albumin g/dL 2.2* 2.3* 2.1*   Total Bilirubin mg/dL 0.3 0.3 0.3   Alkaline Phosphatase U/L 130 150* 145*   AST U/L 35 48* 44*   ALT U/L 24 29 27   Magnesium mg/dL 2.3 2.7* 2.9*   Phosphorus mg/dL 3.9 5.0* 6.5*       Vancomycin Administrations:  vancomycin given in the last 96 hours                     vancomycin (VANCOCIN) 500 mg in dextrose 5 % in water (D5W) 100 mL IVPB (MB+) (mg) 500 mg New Bag 01/20/24 0626    vancomycin (VANCOCIN) 500 mg in dextrose 5 % in water (D5W) 100 mL IVPB (MB+) (mg) 500 mg New Bag 01/18/24 1812                    Microbiologic Results:  Microbiology Results (last 7 days)       Procedure Component Value Units Date/Time    Blood culture [[id number]] Collected: 01/17/24 0909    Order Status: Completed Specimen: Blood from Antecubital, Right Hand Updated: 01/21/24 2012     Blood Culture, Routine No Growth to date      No Growth to date      No Growth to date      No Growth to date      No Growth to date    Blood culture [[id number]] Collected: 01/17/24 0909    Order Status: Completed Specimen: Blood Updated: 01/21/24 2012     Blood Culture, Routine No Growth to date      No Growth to date      No Growth to date      No Growth to date      No Growth to date    Aerobic culture [[id number]]  (Abnormal)  (Susceptibility) Collected: 01/18/24 1346    Order Status: Completed Specimen: Wound from Foot, Left Updated: 01/21/24 1313     Aerobic Bacterial Culture METHICILLIN RESISTANT STAPHYLOCOCCUS AUREUS  Moderate      Culture, Anaerobe [[id number]] Collected: 01/18/24 1346    Order Status: Completed Specimen: Wound from Foot, Left Updated: 01/20/24 1451     Anaerobic Culture Culture in progress    Urine culture [[id number]]  (Abnormal)  (Susceptibility) Collected: 01/15/24 1311    Order Status:  Completed Specimen: Urine Updated: 01/18/24 1224     Urine Culture, Routine METHICILLIN RESISTANT STAPHYLOCOCCUS AUREUS  > 100,000 cfu/ml      Narrative:      Specimen Source->Urine    Blood culture x two cultures. Draw prior to antibiotics. [[id number]]  (Abnormal) Collected: 01/15/24 1308    Order Status: Completed Specimen: Blood from Peripheral, Antecubital, Left Updated: 01/18/24 1014     Blood Culture, Routine Gram stain aer bottle: Gram positive cocci in clusters resembling Staph      Results called to and read back by: Gladis Ricci RN 01/16/2024  08:16      Gram stain olegario bottle: Gram positive cocci in clusters resembling Staph      Positive results previously called 01/16/2024  10:29      METHICILLIN RESISTANT STAPHYLOCOCCUS AUREUS  ID consult required at [facility].  For susceptibility see order #[id number]      Narrative:      Aerobic and anaerobic    Blood culture x two cultures. Draw prior to antibiotics. [[id number]]  (Abnormal)  (Susceptibility) Collected: 01/15/24 1309    Order Status: Completed Specimen: Blood from Peripheral, Antecubital, Right Updated: 01/18/24 1012     Blood Culture, Routine Gram stain aer bottle: Gram positive cocci in clusters resembling Staph      Results called to and read back by: Gladis Ricci RN 01/16/2024  08:16      Gram stain olegario bottle: Gram positive cocci in clusters resembling Staph      Positive results previously called 01/16/2024  10:29      METHICILLIN RESISTANT STAPHYLOCOCCUS AUREUS  ID consult required at [facility].      Narrative:      Aerobic and anaerobic    Rapid Organism ID by PCR (from Blood culture) [[id number]]  (Abnormal) Collected: 01/15/24 1309    Order Status: Completed Updated: 01/16/24 0931     Enterococcus faecalis Not Detected     Enterococcus faecium Not Detected     Listeria monocytogenes Not Detected     Staphylococcus spp. See species for ID     Staphylococcus aureus Detected      Staphylococcus epidermidis Not Detected     Staphylococcus lugdunensis Not Detected     Streptococcus species Not Detected     Streptococcus agalactiae Not Detected     Streptococcus pneumoniae Not Detected     Streptococcus pyogenes Not Detected     Acinetobacter calcoaceticus/baumannii complex Not Detected     Bacteroides fragilis Not Detected     Enterobacterales Not Detected     Enterobacter cloacae complex Not Detected     Escherichia coli Not Detected     Klebsiella aerogenes Not Detected     Klebsiella oxytoca Not Detected     Klebsiella pneumoniae group Not Detected     Proteus Not Detected     Salmonella sp Not Detected     Serratia marcescens Not Detected     Haemophilus influenzae Not Detected     Neisseria meningtidis Not Detected     Pseudomonas aeruginosa Not Detected     Stenotrophomonas maltophilia Not Detected     Candida albicans Not Detected     Candida auris Not Detected     Candida glabrata Not Detected     Candida krusei Not Detected     Candida parapsilosis Not Detected     Candida tropicalis Not Detected     Cryptococcus neoformans/gattii Not Detected     CTX-M (ESBL ) Test Not Applicable     IMP (Carbapenem resistant) Test Not Applicable     KPC resistance gene (Carbapenem resistant) Test Not Applicable     mcr-1  Test Not Applicable     mec A/C  Test Not Applicable     mec A/C and MREJ (MRSA) gene Detected     NDM (Carbapenem resistant) Test Not Applicable     OXA-48-like (Carbapenem resistant) Test Not Applicable     van A/B (VRE gene) Test Not Applicable     VIM (Carbapenem resistant) Test Not Applicable    Narrative:      Aerobic and anaerobic    Influenza A & B by Molecular [[id number]]  (Abnormal) Collected: 01/15/24 1311    Order Status: Completed Specimen: Nasopharyngeal Swab Updated: 01/15/24 1341     Influenza A, Molecular Negative     Influenza B, Molecular Positive     Flu A & B Source Nasal swab

## 2024-01-22 NOTE — ASSESSMENT & PLAN NOTE
- runs of NSVT  - has been on/off amio gtt  - Cardiology following  - remains on amio @ 0.5 mg/min

## 2024-01-23 VITALS
WEIGHT: 219.13 LBS | BODY MASS INDEX: 34.39 KG/M2 | OXYGEN SATURATION: 94 % | HEIGHT: 67 IN | RESPIRATION RATE: 24 BRPM | DIASTOLIC BLOOD PRESSURE: 66 MMHG | TEMPERATURE: 99 F | HEART RATE: 79 BPM | SYSTOLIC BLOOD PRESSURE: 153 MMHG

## 2024-01-23 PROBLEM — Z51.5 COMFORT MEASURES ONLY STATUS: Status: ACTIVE | Noted: 2024-01-23

## 2024-01-23 LAB
ALBUMIN SERPL BCP-MCNC: 2 G/DL (ref 3.5–5.2)
ALP SERPL-CCNC: 157 U/L (ref 55–135)
ALT SERPL W/O P-5'-P-CCNC: 23 U/L (ref 10–44)
ANION GAP SERPL CALC-SCNC: 18 MMOL/L (ref 8–16)
AST SERPL-CCNC: 38 U/L (ref 10–40)
BACTERIA SPEC ANAEROBE CULT: NORMAL
BASOPHILS # BLD AUTO: 0.04 K/UL (ref 0–0.2)
BASOPHILS NFR BLD: 0.2 % (ref 0–1.9)
BILIRUB SERPL-MCNC: 0.3 MG/DL (ref 0.1–1)
BUN SERPL-MCNC: 145 MG/DL (ref 8–23)
CALCIUM SERPL-MCNC: 8.3 MG/DL (ref 8.7–10.5)
CHLORIDE SERPL-SCNC: 98 MMOL/L (ref 95–110)
CO2 SERPL-SCNC: 18 MMOL/L (ref 23–29)
CREAT SERPL-MCNC: 8.4 MG/DL (ref 0.5–1.4)
DIFFERENTIAL METHOD BLD: ABNORMAL
EOSINOPHIL # BLD AUTO: 0.5 K/UL (ref 0–0.5)
EOSINOPHIL NFR BLD: 2.8 % (ref 0–8)
ERYTHROCYTE [DISTWIDTH] IN BLOOD BY AUTOMATED COUNT: 14.6 % (ref 11.5–14.5)
EST. GFR  (NO RACE VARIABLE): 5 ML/MIN/1.73 M^2
GLUCOSE SERPL-MCNC: 139 MG/DL (ref 70–110)
HCT VFR BLD AUTO: 28.3 % (ref 37–48.5)
HGB BLD-MCNC: 9.3 G/DL (ref 12–16)
IMM GRANULOCYTES # BLD AUTO: 0.15 K/UL (ref 0–0.04)
IMM GRANULOCYTES NFR BLD AUTO: 0.9 % (ref 0–0.5)
LYMPHOCYTES # BLD AUTO: 1.4 K/UL (ref 1–4.8)
LYMPHOCYTES NFR BLD: 8.1 % (ref 18–48)
MAGNESIUM SERPL-MCNC: 3 MG/DL (ref 1.6–2.6)
MCH RBC QN AUTO: 29.8 PG (ref 27–31)
MCHC RBC AUTO-ENTMCNC: 32.9 G/DL (ref 32–36)
MCV RBC AUTO: 91 FL (ref 82–98)
MONOCYTES # BLD AUTO: 1.7 K/UL (ref 0.3–1)
MONOCYTES NFR BLD: 9.8 % (ref 4–15)
NEUTROPHILS # BLD AUTO: 13.3 K/UL (ref 1.8–7.7)
NEUTROPHILS NFR BLD: 78.2 % (ref 38–73)
NRBC BLD-RTO: 0 /100 WBC
PHOSPHATE SERPL-MCNC: 6.9 MG/DL (ref 2.7–4.5)
PLATELET # BLD AUTO: 300 K/UL (ref 150–450)
PMV BLD AUTO: 9.7 FL (ref 9.2–12.9)
POCT GLUCOSE: 139 MG/DL (ref 70–110)
POCT GLUCOSE: 145 MG/DL (ref 70–110)
POTASSIUM SERPL-SCNC: 3.8 MMOL/L (ref 3.5–5.1)
PROT SERPL-MCNC: 6.7 G/DL (ref 6–8.4)
RBC # BLD AUTO: 3.12 M/UL (ref 4–5.4)
SODIUM SERPL-SCNC: 134 MMOL/L (ref 136–145)
WBC # BLD AUTO: 17.01 K/UL (ref 3.9–12.7)

## 2024-01-23 PROCEDURE — 63600175 PHARM REV CODE 636 W HCPCS

## 2024-01-23 PROCEDURE — 25000003 PHARM REV CODE 250: Performed by: NURSE PRACTITIONER

## 2024-01-23 PROCEDURE — 25000003 PHARM REV CODE 250: Performed by: PHYSICIAN ASSISTANT

## 2024-01-23 PROCEDURE — 84100 ASSAY OF PHOSPHORUS: CPT | Performed by: INTERNAL MEDICINE

## 2024-01-23 PROCEDURE — 94761 N-INVAS EAR/PLS OXIMETRY MLT: CPT

## 2024-01-23 PROCEDURE — 94003 VENT MGMT INPAT SUBQ DAY: CPT

## 2024-01-23 PROCEDURE — 85025 COMPLETE CBC W/AUTO DIFF WBC: CPT | Performed by: INTERNAL MEDICINE

## 2024-01-23 PROCEDURE — 83735 ASSAY OF MAGNESIUM: CPT | Performed by: INTERNAL MEDICINE

## 2024-01-23 PROCEDURE — 80053 COMPREHEN METABOLIC PANEL: CPT | Performed by: INTERNAL MEDICINE

## 2024-01-23 PROCEDURE — 27100171 HC OXYGEN HIGH FLOW UP TO 24 HOURS

## 2024-01-23 PROCEDURE — 99900035 HC TECH TIME PER 15 MIN (STAT)

## 2024-01-23 PROCEDURE — 63600175 PHARM REV CODE 636 W HCPCS: Performed by: INTERNAL MEDICINE

## 2024-01-23 PROCEDURE — 25000003 PHARM REV CODE 250: Performed by: INTERNAL MEDICINE

## 2024-01-23 PROCEDURE — 63600175 PHARM REV CODE 636 W HCPCS: Mod: JZ,JG | Performed by: INTERNAL MEDICINE

## 2024-01-23 PROCEDURE — 36415 COLL VENOUS BLD VENIPUNCTURE: CPT | Performed by: INTERNAL MEDICINE

## 2024-01-23 PROCEDURE — 99900026 HC AIRWAY MAINTENANCE (STAT)

## 2024-01-23 RX ORDER — MORPHINE SULFATE 2 MG/ML
2 INJECTION, SOLUTION INTRAMUSCULAR; INTRAVENOUS EVERY 4 HOURS PRN
Status: DISCONTINUED | OUTPATIENT
Start: 2024-01-23 | End: 2024-01-23 | Stop reason: HOSPADM

## 2024-01-23 RX ADMIN — MORPHINE SULFATE 2 MG: 2 INJECTION, SOLUTION INTRAMUSCULAR; INTRAVENOUS at 04:01

## 2024-01-23 RX ADMIN — HEPARIN SODIUM 5000 UNITS: 5000 INJECTION INTRAVENOUS; SUBCUTANEOUS at 06:01

## 2024-01-23 RX ADMIN — CHLORHEXIDINE GLUCONATE 0.12% ORAL RINSE 15 ML: 1.2 LIQUID ORAL at 09:01

## 2024-01-23 RX ADMIN — MORPHINE SULFATE 2 MG: 2 INJECTION, SOLUTION INTRAMUSCULAR; INTRAVENOUS at 12:01

## 2024-01-23 RX ADMIN — ASPIRIN 81 MG CHEWABLE TABLET 81 MG: 81 TABLET CHEWABLE at 09:01

## 2024-01-23 RX ADMIN — METOPROLOL TARTRATE 50 MG: 50 TABLET, FILM COATED ORAL at 09:01

## 2024-01-23 RX ADMIN — GLYCOPYRROLATE 0.1 MG: 0.2 INJECTION, SOLUTION INTRAMUSCULAR; INTRAVITREAL at 04:01

## 2024-01-23 RX ADMIN — PRAVASTATIN SODIUM 40 MG: 20 TABLET ORAL at 09:01

## 2024-01-23 RX ADMIN — AMIODARONE HYDROCHLORIDE 0.5 MG/MIN: 1.8 INJECTION, SOLUTION INTRAVENOUS at 01:01

## 2024-01-23 RX ADMIN — FAMOTIDINE 20 MG: 40 POWDER, FOR SUSPENSION ORAL at 09:01
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
## 2024-01-23 NOTE — ASSESSMENT & PLAN NOTE
- initially thought Cardiac most likely given runs of NSVT and good oxgenation, but now blood cultures are showing Staph (though no evidence of true septic shock given stable hemodynamics)  - empiric Abx  - Cardiology following  - back on the amio gtt 1/19  - replete electrolytes cautiously given ESRD  - Echo with mildly decreased LVEF  - Cardiac monitoring  - avoid fever  - initial CT Head unremarkable  - EEG with severe diffuse encephalopathy    1/23 - transitioned to comfort , support

## 2024-01-23 NOTE — ASSESSMENT & PLAN NOTE
- MWF via left chest vascath  - no acute indication  - Nephrology consulted  - tolerated dialysis 1/18  - renally dose meds and avoid nephrotoxins    1/23 -cont support

## 2024-01-23 NOTE — PROGRESS NOTES
Therapy with vancomycin complete and/or consult discontinued by provider.  Pharmacy will sign off, please re-consult as needed.    Thank you for allowing us to participate in this patient's care.   Katherine McArdle, Pharm.D. 1/23/2024 11:57 AM

## 2024-01-23 NOTE — PLAN OF CARE
[facility] (Hospital)  Discharge Final Note    Primary Care Provider: No, Primary Doctor    Expected Discharge Date: 1/23/2024    Final Discharge Note (most recent)       Final Note - 01/23/24 1605          Final Note    Assessment Type Final Discharge Note     Anticipated Discharge Disposition Hospice/Medical Facility     Hospital Resources/Appts/Education Provided Post-Acute resouces added to AVS        Post-Acute Status    Post-Acute Authorization Hospice     Hospice Status Set-up Complete/Auth obtained     Discharge Delays None known at this time                     Important Message from Medicare             Contact Info       [facility]   Specialty: Home Hospice, Respite Care    [address] [facility] [address]   Phone: [phone]       Next Steps: Follow up          DC disposition: [facility], inpatient   Family notified: via phone   Transportation: [place]   Nurse provided with phone number for report.  All DC info uploaded into [employer].     GINETTE Romero

## 2024-01-23 NOTE — ASSESSMENT & PLAN NOTE
- Urine and Blood from 1/15 positive for MRSA  - repeat blood cultures 1/17 NGTD  - wound cultures 1/18 growing Staph  - continue Vanc, empiric Merrem  - consulted ID  - hemodynamics stable    Now comfort measures
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
## 2024-01-23 NOTE — PLAN OF CARE
Nutrition Recommendations 1/23/24:  1. As medically appropriate, recommend pt continues on Enteral nutrition, Novasource renal via NG/OG tube, goal rate 40 mL/hr  -Formula at goal rate provides: 1920 kcals/day (105% EEN), 87 g protein/day (110% EPN), 176 g CHO/day (77% CHO needs), 688 mL free formula water/day   -135 mL q4h free water flushes (810 mL/day) = total from formula + FWF = 1498 mL water/day, per MD/NP   -Monitor Mg, K+, Na, Phos and Glu, correct as indicated   2. Recommend pt continues to receive John BID for wound healing as warranted   3. Weigh twice weekly  4.Collaboration with other providers     Goals:   1. Pt will be initiated onto EN within 24 hrs (Resolved)   2. Pt will continue to tolerate and intake > 75% EEN and EPN prior to RD follow up as medically appropriate   3. Pt will continue to intake John BID as warranted     Helen Rangel, Registration Eligible, Provisional LDN

## 2024-01-23 NOTE — PLAN OF CARE
The patient remained comfortable and rested. Vital signs were stable and blood glucose levels were within the target range. The patient is oliguric, please see flow sheet. The patients status was changed to DNR and the plan is to transfer to hospice today.   Continuously  monitor the patients comfort level and provide support as needed.

## 2024-01-23 NOTE — PROGRESS NOTES
[facility] - Intensive Care ([facility])  Critical Care Medicine  Progress Note    Patient Name: Sydney Lejeune  MRN: [id number]  Admission Date: 1/15/2024  Hospital Length of Stay: 8 days  Code Status: DNR  Attending Provider: David Barrios MD  Primary Care Provider: No, Primary Doctor   Principal Problem: <principal problem not specified>    Subjective:     HPI:  Ms Lejeune is a 72 y/o WF with ESRD, DM, HTN, and chronic left foot wound with osteo presents via EMS to the ER today after being found slumped over her steering wheel at a gas station.  History is taken from the medical record as the patient is unable to provide history and no family available at the time of my exam.  On EMS arrival, she was pulseless and apneic.  ROSC achieved following 6 rounds of CPR (Epi x 1 and 300mg of Amio).  She was intubated in the ER upon arrival.  Hemodynamics stable and low/mod vent requirements but ongoign short runs of NSVT.  Cardiology consulted in the ER.  Currently on amio gtt and propofol.  Initial CT Head unremarkable.  Labs notable for Lactic of 8, WBC 23.89, Hgb 9.8, K 3.6, BNP 1466, Trop 0.043, positive Flu B.  ABG shows metabolic acidosis with good respiratory compensation and pO2 505.  She is admitted to the ICU for post-arrest care.    Hospital/ICU Course:  1/15 - admitted to ICU following out of hospital cardiac arrest.  Minimal vent and hemodynamics stable.  1/16 - No acute events.  Rhythm stable.  Vent and hemodynamics stable.  Blood Cx GS with GPC in clusters.  1/17 - No acute events.  Vent and hemodynamics stable.  Neuro exam largely unchanged.  1/18 - No acute events.  Vent and hemodynamics stable.  Neuro exam largely unchanged.  1/19 - No significant changes.  EEG showed severe diffuse encephalopathy.  1/20 - No significant changes.  Reports of some more L sided movement overnight, but nothing for me.  1/21 - Vent and hemodynamics stable.  Neuro exam unchanged.  Remains on PSV.  1/22 - vent and hemodynamics  stable.  Neuro exam unchanged.  Remains on PSV.  1/23- transitioned to comfort     Interval History: no acute events  Per family request   Comfort measures      Objective:     Vital Signs (Most Recent):  Temp: 98.7 °F (37.1 °C) (01/23/24 1105)  Pulse: 72 (01/23/24 1207)  Resp: (!) 26 (01/23/24 1207)  BP: (!) 166/72 (01/23/24 1207)  SpO2: (!) 89 % (RN at bedside) (01/23/24 1207) Vital Signs (24h Range):  Temp:  [98.7 °F (37.1 °C)-99.7 °F (37.6 °C)] 98.7 °F (37.1 °C)  Pulse:  [64-72] 72  Resp:  [10-40] 26  SpO2:  [89 %-100 %] 89 %  BP: (121-166)/(57-74) 166/72     Weight: 99.4 kg (219 lb 2.2 oz)  Body mass index is 34.32 kg/m².      Intake/Output Summary (Last 24 hours) at 1/23/2024 1342  Last data filed at 1/23/2024 0600  Gross per 24 hour   Intake 1900.66 ml   Output 593 ml   Net 1307.66 ml        Physical Exam  Vitals and nursing note reviewed.   Constitutional:       General: She is not in acute distress.     Appearance: She is obese. She is ill-appearing.   HENT:      Head: Normocephalic and atraumatic.   Eyes:      General:         Right eye: No discharge.         Left eye: No discharge.   Cardiovascular:      Rate and Rhythm: Normal rate and regular rhythm.   Pulmonary:      Effort: No respiratory distress.      Breath sounds: No wheezing or rales.   Abdominal:      General: There is no distension.      Palpations: Abdomen is soft.   Musculoskeletal:         General: Swelling present. No tenderness.   Neurological:      Comments: Not follow commands           Review of Systems   Reason unable to perform ROS: unable to give.       Vents:  Vent Mode: A/C (01/23/24 1155)  Set Rate: 16 BPM (01/23/24 1155)  Vt Set: 450 mL (01/23/24 1155)  Pressure Support: 7 cmH20 (01/21/24 1526)  PEEP/CPAP: 5 cmH20 (01/23/24 1155)  Oxygen Concentration (%): 30 (01/23/24 1155)  Peak Airway Pressure: 28 cmH20 (01/23/24 1155)  Plateau Pressure: 17 cmH20 (01/23/24 1155)  Total Ve: 6.92 L/m (01/23/24 1155)  Negative Inspiratory Force  (cm H2O): 0 (01/23/24 1155)  F/VT Ratio<105 (RSBI): (!) 54.5 (01/23/24 1155)    Lines/Drains/Airways       Central Venous Catheter Line  Duration                  Hemodialysis Catheter right subclavian -- days              Drain  Duration                  NG/OG Tube 01/15/24 1333 orogastric Center mouth 8 days         Urethral Catheter 01/15/24 1332 Latex;Double-lumen 16 Fr. 8 days         Rectal Tube 01/21/24 1830 1 day              Peripheral Intravenous Line  Duration                  Peripheral IV - Single Lumen 01/15/24 1302 18 G Left Antecubital 8 days         Peripheral IV - Single Lumen 01/15/24 1313 18 G Right Antecubital 8 days         Peripheral IV - Single Lumen 01/19/24 0115 20 G Anterior;Left Forearm 4 days                    Significant Labs:    CBC/Anemia Profile:  Recent Labs   Lab 01/22/24  0406 01/23/24  0329   WBC 18.96* 17.01*   HGB 9.7* 9.3*   HCT 30.0* 28.3*    300   MCV 93 91   RDW 14.8* 14.6*        Chemistries:  Recent Labs   Lab 01/22/24  0406 01/23/24  0329   * 134*   K 3.8 3.8   CL 99 98   CO2 18* 18*   * 145*   CREATININE 7.3* 8.4*   CALCIUM 8.3* 8.3*   ALBUMIN 2.1* 2.0*   PROT 6.7 6.7   BILITOT 0.3 0.3   ALKPHOS 145* 157*   ALT 27 23   AST 44* 38   MG 2.9* 3.0*   PHOS 6.5* 6.9*       All pertinent labs within the past 24 hours have been reviewed.    Significant Imaging:  I have reviewed all pertinent imaging results/findings within the past 24 hours.    ABG  Recent Labs   Lab 01/22/24  1227   PH 7.417   PO2 95   PCO2 31.3*   HCO3 20.2*   BE -4*     Assessment/Plan:     Cardiac/Vascular  Ventricular tachycardia  - runs of NSVT  - has been on/off amio gtt  - Cardiology following  - remains on amio @ 0.5 mg/min    HTN (hypertension)  - holding home meds acutely  - will restart, as necessary    Cardiac arrest  - initially thought Cardiac most likely given runs of NSVT and good oxgenation, but now blood cultures are showing Staph (though no evidence of true septic shock  given stable hemodynamics)  - empiric Abx  - Cardiology following  - back on the amio gtt 1/19  - replete electrolytes cautiously given ESRD  - Echo with mildly decreased LVEF  - Cardiac monitoring  - avoid fever  - initial CT Head unremarkable  - EEG with severe diffuse encephalopathy    1/23 - transitioned to comfort , support     Renal/  ESRD (end stage renal disease)  - MWF via left chest vascath  - no acute indication  - Nephrology consulted  - tolerated dialysis 1/18  - renally dose meds and avoid nephrotoxins    1/23 -cont support     ID  MRSA bacteremia  - Urine and Blood from 1/15 positive for MRSA  - repeat blood cultures 1/17 NGTD  - wound cultures 1/18 growing Staph  - continue Vanc, empiric Merrem  - consulted ID  - hemodynamics stable    Now comfort measures    Influenza B  - likely an incidental finding given her excellent oxygenation  - held off on Tamiflu given ESRD  - Droplet precautions  - monitor    Chronic osteomyelitis  - WOCN consulted  - best I can tell, not on any chronic Abx for this.  It seems she gets spot treated here and there and follows with outpt Wound Care regularly  - blood Cx 1/15 now with MRSA  - getting broad spectrum Abx  - repeat Cx NGTD  - ID following    Endocrine  Type 2 diabetes mellitus with kidney complication, with long-term current use of insulin  - SSI/Accuchecks  Now comfort     Palliative Care  Comfort measures only status  Transitioned to comfort   Possible hospice            Basia Darnell MD  Critical Care Medicine  [facility] - [facility]ensive Care ([facility])

## 2024-01-23 NOTE — NURSING
Epic Downtime at 530, late nursing note    The patient is currently hemodynamically stable and on an Amiodarone drip with controlled ventricular response. The patient has a [place] Coma Scale (GCS) score of 4, and both corneal and cough reflexes are still present, with spontaneous eye opening. The patients family will possibly discussing comfort measures with the medical team this morning.

## 2024-01-23 NOTE — SUBJECTIVE & OBJECTIVE
Interval History: no acute events  Per family request   Comfort measures      Objective:     Vital Signs (Most Recent):  Temp: 98.7 °F (37.1 °C) (01/23/24 1105)  Pulse: 72 (01/23/24 1207)  Resp: (!) 26 (01/23/24 1207)  BP: (!) 166/72 (01/23/24 1207)  SpO2: (!) 89 % (RN at bedside) (01/23/24 1207) Vital Signs (24h Range):  Temp:  [98.7 °F (37.1 °C)-99.7 °F (37.6 °C)] 98.7 °F (37.1 °C)  Pulse:  [64-72] 72  Resp:  [10-40] 26  SpO2:  [89 %-100 %] 89 %  BP: (121-166)/(57-74) 166/72     Weight: 99.4 kg (219 lb 2.2 oz)  Body mass index is 34.32 kg/m².      Intake/Output Summary (Last 24 hours) at 1/23/2024 1342  Last data filed at 1/23/2024 0600  Gross per 24 hour   Intake 1900.66 ml   Output 593 ml   Net 1307.66 ml        Physical Exam  Vitals and nursing note reviewed.   Constitutional:       General: She is not in acute distress.     Appearance: She is obese. She is ill-appearing.   HENT:      Head: Normocephalic and atraumatic.   Eyes:      General:         Right eye: No discharge.         Left eye: No discharge.   Cardiovascular:      Rate and Rhythm: Normal rate and regular rhythm.   Pulmonary:      Effort: No respiratory distress.      Breath sounds: No wheezing or rales.   Abdominal:      General: There is no distension.      Palpations: Abdomen is soft.   Musculoskeletal:         General: Swelling present. No tenderness.   Neurological:      Comments: Not follow commands           Review of Systems   Reason unable to perform ROS: unable to give.       Vents:  Vent Mode: A/C (01/23/24 1155)  Set Rate: 16 BPM (01/23/24 1155)  Vt Set: 450 mL (01/23/24 1155)  Pressure Support: 7 cmH20 (01/21/24 1526)  PEEP/CPAP: 5 cmH20 (01/23/24 1155)  Oxygen Concentration (%): 30 (01/23/24 1155)  Peak Airway Pressure: 28 cmH20 (01/23/24 1155)  Plateau Pressure: 17 cmH20 (01/23/24 1155)  Total Ve: 6.92 L/m (01/23/24 1155)  Negative Inspiratory Force (cm H2O): 0 (01/23/24 1155)  F/VT Ratio<105 (RSBI): (!) 54.5 (01/23/24  1155)    Lines/Drains/Airways       Central Venous Catheter Line  Duration                  Hemodialysis Catheter right subclavian -- days              Drain  Duration                  NG/OG Tube 01/15/24 1333 orogastric Center mouth 8 days         Urethral Catheter 01/15/24 1332 Latex;Double-lumen 16 Fr. 8 days         Rectal Tube 01/21/24 1830 1 day              Peripheral Intravenous Line  Duration                  Peripheral IV - Single Lumen 01/15/24 1302 18 G Left Antecubital 8 days         Peripheral IV - Single Lumen 01/15/24 1313 18 G Right Antecubital 8 days         Peripheral IV - Single Lumen 01/19/24 0115 20 G Anterior;Left Forearm 4 days                    Significant Labs:    CBC/Anemia Profile:  Recent Labs   Lab 01/22/24  0406 01/23/24  0329   WBC 18.96* 17.01*   HGB 9.7* 9.3*   HCT 30.0* 28.3*    300   MCV 93 91   RDW 14.8* 14.6*        Chemistries:  Recent Labs   Lab 01/22/24  0406 01/23/24  0329   * 134*   K 3.8 3.8   CL 99 98   CO2 18* 18*   * 145*   CREATININE 7.3* 8.4*   CALCIUM 8.3* 8.3*   ALBUMIN 2.1* 2.0*   PROT 6.7 6.7   BILITOT 0.3 0.3   ALKPHOS 145* 157*   ALT 27 23   AST 44* 38   MG 2.9* 3.0*   PHOS 6.5* 6.9*       All pertinent labs within the past 24 hours have been reviewed.    Significant Imaging:  I have reviewed all pertinent imaging results/findings within the past 24 hours.

## 2024-01-23 NOTE — PROGRESS NOTES
Spoke with all 3 children (Kathia, Giancarlo Mcnair and Saad)  and would like to transition to comfort. None of them want to be here. Friend is at the beside.   Comfort measures

## 2024-01-23 NOTE — PLAN OF CARE
01/23/24 1359   Post-Acute Status   Post-Acute Authorization Placement   Post-Acute Placement Status Referrals Sent   Discharge Plan   Discharge Plan A Inpatient Hospice     Discussed admission to inpatient hospice with daughter, Kathia. She is agreeable to referral being sent to [facility].

## 2024-01-24 NOTE — NURSING
Pt transported to Hospice via [facility]. Copy of DNR given to [facility]. Report called to Aby at [facility]

## 2024-01-25 LAB
HBV SURFACE AB SER QL IA: POSITIVE
HBV SURFACE AB SERPL IA-ACNC: 50 MIU/ML

## 2024-01-26 NOTE — PHYSICIAN QUERY
PT Name: Sydney Lejeune  MR #: [id number]     DOCUMENTATION CLARIFICATION     CDS/: JAMES Cline, RN, CDS               Contact information:[email or web address]   This form is a permanent document in the medical record.     Query Date: January 26, 2024    By submitting this query, we are merely seeking further clarification of documentation.  Please utilize your independent clinical judgment when addressing the question(s) below.  The Medical Record contains the following:  Indicators Supporting Clinical Findings Location in Medical Record   X HR         RR          BP        Temp Vital Signs (24h Range):  Temp:  [98.3 °F (36.8 °C)-99.4 °F (37.4 °C)] 99.4 °F (37.4 °C)  Pulse:  [49-74] 72  Resp:  [20-45] 30  SpO2:  [76 %-100 %] 98 %  BP: (131-215)/() 184/81    Vital Signs (24h Range):  Temp:  [97.4 °F (36.3 °C)-99 °F (37.2 °C)] 98.2 °F (36.8 °C)  Pulse:  [67-83] 75  Resp:  [18-24] 20  SpO2:  [100 %] 100 %  BP: (136-172)/(55-77) 136/65   H&P, Dr. Barrios, 1/15              ID, Dr. Crowder, 1/17   X Lactic Acid          Procalcitonin  01/15/24 13:08   Procalcitonin 0.24      01/15/24 13:08 01/15/24 18:33   Lactate, Markell 8.0 (HH) 3.9 (HH)    Lab 1/15          Lab 1/15   X WBC           Bands          CRP     01/15 01/16 01/17 01/18 01/19 01/20 01/21 01/22 01/23   WBC 23.89 (H) 15.69 (H) 17.22 (H)   15.24 (H)   20.54 (H) 21.08 (H) 21.47 (H) 18.96 (H) 17.01 (H)      Lab 1/15             X Culture(s) Blood culture: Methicillin resistant Staphylococcus aureus     Urine culture: Methicillin resistant Staphylococcus aureus     Left Foot wound culture: Methicillin resistant Staphylococcus aureus      Lab 1/15    Lab 1/15    Lab 1/17   X AMS, Confusion, LOC, etc. Concern for hypoxic encephalopathy  Chances of recovery less, risk for anoxic encephalopathy    Neph, Dr. Barry, 1/17   X Organ Dysfunction/Failure Respiratory failure    H&P, Dr. Barrios, 1/15   X Bacteremia or Sepsis / Septic Severe  Sepsis    Cardiac arrest-Hep gtt ordered, trop 4.9 trending down 3.5 likely demand from sepsis blood culture positive for staph    MRSA Bacteremia    Severe Sepsis- Final Active Diagnosis    ED, Dr. Mishra, 1/15    Arely, BIN Warren, MIGUEL/Dr. England, 1/16      Pulm, Dr. Barrios, 1/17    NV, Dr. Darnell, 1/23   X Known or Suspected Source of Infection documented UTI    Chronic osteomyelitis -wound cultures 1/18 growing Staph  ED, Dr. Mishra, 1/15    Pulm, Dr. Darnell, 1/23      (Failed) Outpatient Treatment     X Treatment Empiric antibiotic    continue Vanc, empiric Rocephin     continue Vanc, empiric Merrem   Now comfort care   H&P, Dr. Barrios, 1/15    Pulm, Dr. Barrios, 1/17    Pulm, Dr. Darnell, 1/23   X Other Cardiac arrest  - etiology unclear, initially though Cardiac most likely given runs of NSVT and good oxgenation, but now blood cultures are showing Staph    Influenza B-likely an incidental finding given her excellent oxygenation - will hold off on Tamiflu given ESRD    left chest vascath- no acute indication    Initially thought Cardiac most likely given runs of NSVT and good oxgenation, but now blood cultures are showing Staph (though no evidence of true septic shock given stable hemodynamics)     initiated on antimicrobial therapy with IV Vancomycin for treatment of MRSA UTI, bacteremia, & chronic osteomyelitis     H&P, Dr. Bariros, 1/15                  Pulm, Dr. Barrios, 1/17        Pharmacy, 1/22        Provider, please specify diagnosis associated with above clinical findings.    [   ] Sepsis with MRSA Bacteremia     [   ] Severe Sepsis with MRSA Bacteremia, (please specify organ dysfunction/failure): _______     [   ] MRSA Bacteremia without Sepsis     [   ] Other Infectious Disease (please specify): __________     [ x ] Clinically Undetermined  I assume mrsa bacteremia without sepsis , did have organ failure on vent after arrest and chronic kidney disease         Please document in your progress  notes daily for the duration of treatment until resolved and include in your discharge summary.

## 2024-01-26 NOTE — PHYSICIAN QUERY
PT Name: Sydney Lejeune  MR #: [id number]    DOCUMENTATION CLARIFICATION     CDS/: JAMES Cline, RN, CDS               Contact information:[email or web address]  This form is a permanent document in the medical record.     Query Date: January 26, 2024    By submitting this query, we are merely seeking further clarification of documentation. Please utilize your independent clinical judgment when addressing the question(s) below.    The Medical Record contains the following:   Indicators   Supporting Clinical Findings Location in Medical Record   X AMS, Confusion,  LOC, etc.   Pt was found slumped over the steering wheel at a gas station, pulseless and apneic for unknown time but paramedics report she was still warm to touch at that time.. Enroute, pt achieved ROSC after 6 rounds CPR, 3 Epinephrine, and 300 mg Amiodarone.      Unresponsive     Concern is for hypoxic encephalopathy.      Cardiac arrest Occurred outside the hospital, Prolonged LOC  Chances of recovery less, risk for anoxic encephalopathy  Awaiting 72 hours post event to obtain neurologic evaluation     Neuro exam largely unchanged.    No response to voice, no withdrawal to pain, weak and delayed cough, spontaneous eyes movement,  pupillary and corneals intact       EEG showed severe diffuse encephalopathy.      Nursing reports some movement to the LUE overnight.  Nothing for me on exam this morning.  Opening eyes spontaneously      Opens eyes to pain, no withdrawal, no cough, intact pupillary and corneals, good respiratory effort       Neuro exam unchanged    Not follow commands    ED, Dr. Mishra, 1/15                 Neph, Dr. Barry, 1/17                 Pulm, Dr. Barrios, 1/18           Pulm, Dr. Barrios, 1/20                   Pulm, Dr. Darnell, 1/23   X Acute/Chronic Illness  Cardiac arrest, influenza B     ESRD, DM, HTN, and chronic left foot wound with osteo     H&P, Dr. Barrios, 1/15   X Radiology Findings  CT unremarkable     H&P,   Denis, 1/15    Electrolyte Imbalance      Medication     X Treatment          Intubated   Cardiac monitoring      Transitioned to comfort     H&P, Dr. Barrios, 1/15       Pulm, Dr. Darnell, 1/23    Other       The noted clinical guidelines are only system guidelines and do not replace the providers clinical judgment.    The National [place] of Neurologic Disorders and Stroke (NINDS) of the NIH describes encephalopathy as any diffuse disease of the brain that alters brain function or structure.    Provider, please further specify the Encephalopathy diagnosis associated with above clinical findings.    [   x] Anoxic/Hypoxic Encephalopathy- Permanent brain damage due to anoxia/hypoxia     [   ] Encephalopathy, unspecified        [   ] Other Encephalopathy (please specify): ____________________     [   ] Other neurological condition- Includes Post-ictal altered mental status (please specify condition): __________   [   ]  Clinically Undetermined               Please document in your progress notes daily for the duration of treatment until resolved, and include in your discharge summary.    References:  RAJANI Fonseca RN, CCDS. (2018, June 9). Notes from the Instructor: Encephalopathy tips. Retrieved October 22, 2020, from [email or web address].org/articles/[email or web address]-instructor-encephalopathy-tips    ICD-9-CM Coding Clinic First Quarter 2013, Effective with discharges: October 21, 2013 [facility] § Seizure with encephalopathy due to postictal state (2013).    ICD-10-CM/PCS  Integrated Codebook (Version V.20.8.10.0) [Computer software]. (2020). Retrieved October 21, 2020.    National [place] of Neurological Disorders and Stroke. (2019, March 27). Retrieved October 22, 2020, from https://www.ninds.nih.gov/Disorders/All-Disorders/Levidjgezhplmc-Ltnhzwigqzd-Rygj    Form No. 17634

## 2024-04-22 PROBLEM — A41.9 SEVERE SEPSIS: Status: RESOLVED | Noted: 2024-01-19 | Resolved: 2024-04-22

## 2024-04-22 PROBLEM — R65.20 SEVERE SEPSIS: Status: RESOLVED | Noted: 2024-01-19 | Resolved: 2024-04-22
